# Patient Record
Sex: MALE | Race: WHITE | NOT HISPANIC OR LATINO | URBAN - METROPOLITAN AREA
[De-identification: names, ages, dates, MRNs, and addresses within clinical notes are randomized per-mention and may not be internally consistent; named-entity substitution may affect disease eponyms.]

---

## 2017-06-07 ENCOUNTER — OUTPATIENT (OUTPATIENT)
Dept: OUTPATIENT SERVICES | Facility: HOSPITAL | Age: 82
LOS: 1 days | End: 2017-06-07

## 2017-06-14 ENCOUNTER — OUTPATIENT (OUTPATIENT)
Dept: OUTPATIENT SERVICES | Facility: HOSPITAL | Age: 82
LOS: 1 days | End: 2017-06-14

## 2017-06-21 ENCOUNTER — EMERGENCY (EMERGENCY)
Facility: HOSPITAL | Age: 82
LOS: 1 days | End: 2017-06-21
Payer: MEDICARE

## 2017-06-21 PROCEDURE — 99283 EMERGENCY DEPT VISIT LOW MDM: CPT

## 2018-03-05 ENCOUNTER — EMERGENCY (EMERGENCY)
Facility: HOSPITAL | Age: 83
LOS: 1 days | End: 2018-03-05
Payer: MEDICARE

## 2018-03-05 PROCEDURE — 99284 EMERGENCY DEPT VISIT MOD MDM: CPT

## 2018-03-09 ENCOUNTER — OUTPATIENT (OUTPATIENT)
Dept: OUTPATIENT SERVICES | Facility: HOSPITAL | Age: 83
LOS: 1 days | End: 2018-03-09

## 2018-03-10 ENCOUNTER — EMERGENCY (EMERGENCY)
Facility: HOSPITAL | Age: 83
LOS: 1 days | End: 2018-03-10
Payer: MEDICARE

## 2018-03-10 PROCEDURE — 99284 EMERGENCY DEPT VISIT MOD MDM: CPT

## 2018-03-14 ENCOUNTER — OUTPATIENT (OUTPATIENT)
Dept: OUTPATIENT SERVICES | Facility: HOSPITAL | Age: 83
LOS: 1 days | End: 2018-03-14

## 2018-03-20 ENCOUNTER — INPATIENT (INPATIENT)
Facility: HOSPITAL | Age: 83
LOS: 13 days | Discharge: ANOTHER HOSPITAL | End: 2018-04-03
Attending: FAMILY MEDICINE | Admitting: FAMILY MEDICINE
Payer: MEDICARE

## 2018-03-20 ENCOUNTER — OUTPATIENT (OUTPATIENT)
Dept: OUTPATIENT SERVICES | Facility: HOSPITAL | Age: 83
LOS: 1 days | End: 2018-03-20

## 2018-03-20 PROCEDURE — 71045 X-RAY EXAM CHEST 1 VIEW: CPT | Mod: 26

## 2018-03-20 PROCEDURE — 99285 EMERGENCY DEPT VISIT HI MDM: CPT

## 2018-03-21 ENCOUNTER — OUTPATIENT (OUTPATIENT)
Dept: OUTPATIENT SERVICES | Facility: HOSPITAL | Age: 83
LOS: 1 days | End: 2018-03-21

## 2018-03-22 ENCOUNTER — OUTPATIENT (OUTPATIENT)
Dept: OUTPATIENT SERVICES | Facility: HOSPITAL | Age: 83
LOS: 1 days | End: 2018-03-22

## 2018-03-23 ENCOUNTER — OUTPATIENT (OUTPATIENT)
Dept: OUTPATIENT SERVICES | Facility: HOSPITAL | Age: 83
LOS: 1 days | End: 2018-03-23

## 2018-03-23 PROCEDURE — 72192 CT PELVIS W/O DYE: CPT | Mod: 26

## 2018-03-24 ENCOUNTER — OUTPATIENT (OUTPATIENT)
Dept: OUTPATIENT SERVICES | Facility: HOSPITAL | Age: 83
LOS: 1 days | End: 2018-03-24

## 2018-03-24 PROCEDURE — 71045 X-RAY EXAM CHEST 1 VIEW: CPT | Mod: 26

## 2018-03-25 ENCOUNTER — OUTPATIENT (OUTPATIENT)
Dept: OUTPATIENT SERVICES | Facility: HOSPITAL | Age: 83
LOS: 1 days | End: 2018-03-25

## 2018-03-25 PROCEDURE — 71045 X-RAY EXAM CHEST 1 VIEW: CPT | Mod: 26

## 2018-03-26 ENCOUNTER — OUTPATIENT (OUTPATIENT)
Dept: OUTPATIENT SERVICES | Facility: HOSPITAL | Age: 83
LOS: 1 days | End: 2018-03-26

## 2018-03-28 ENCOUNTER — OUTPATIENT (OUTPATIENT)
Dept: OUTPATIENT SERVICES | Facility: HOSPITAL | Age: 83
LOS: 1 days | End: 2018-03-28

## 2018-03-28 PROCEDURE — 74178 CT ABD&PLV WO CNTR FLWD CNTR: CPT | Mod: 26

## 2018-03-29 ENCOUNTER — OUTPATIENT (OUTPATIENT)
Dept: OUTPATIENT SERVICES | Facility: HOSPITAL | Age: 83
LOS: 1 days | End: 2018-03-29

## 2018-03-30 ENCOUNTER — OUTPATIENT (OUTPATIENT)
Dept: OUTPATIENT SERVICES | Facility: HOSPITAL | Age: 83
LOS: 1 days | End: 2018-03-30

## 2018-03-31 ENCOUNTER — OUTPATIENT (OUTPATIENT)
Dept: OUTPATIENT SERVICES | Facility: HOSPITAL | Age: 83
LOS: 1 days | End: 2018-03-31

## 2018-04-01 ENCOUNTER — OUTPATIENT (OUTPATIENT)
Dept: OUTPATIENT SERVICES | Facility: HOSPITAL | Age: 83
LOS: 1 days | End: 2018-04-01

## 2018-04-02 ENCOUNTER — OUTPATIENT (OUTPATIENT)
Dept: OUTPATIENT SERVICES | Facility: HOSPITAL | Age: 83
LOS: 1 days | End: 2018-04-02

## 2018-04-03 ENCOUNTER — OUTPATIENT (OUTPATIENT)
Dept: OUTPATIENT SERVICES | Facility: HOSPITAL | Age: 83
LOS: 1 days | End: 2018-04-03

## 2019-08-20 ENCOUNTER — APPOINTMENT (OUTPATIENT)
Dept: ULTRASOUND IMAGING | Facility: CLINIC | Age: 84
End: 2019-08-20
Payer: MEDICARE

## 2019-08-20 PROCEDURE — 76775 US EXAM ABDO BACK WALL LIM: CPT

## 2019-10-29 ENCOUNTER — APPOINTMENT (OUTPATIENT)
Dept: RADIOLOGY | Facility: CLINIC | Age: 84
End: 2019-10-29
Payer: MEDICARE

## 2019-10-29 PROCEDURE — 71046 X-RAY EXAM CHEST 2 VIEWS: CPT

## 2019-10-31 ENCOUNTER — OUTPATIENT (OUTPATIENT)
Dept: OUTPATIENT SERVICES | Facility: HOSPITAL | Age: 84
LOS: 1 days | End: 2019-10-31

## 2019-11-25 ENCOUNTER — INPATIENT (INPATIENT)
Facility: HOSPITAL | Age: 84
LOS: 21 days | Discharge: NURSING, REHAB OR LONG-TERM CARE | End: 2019-12-17
Attending: SPECIALIST | Admitting: SPECIALIST
Payer: MEDICARE

## 2019-11-25 VITALS
WEIGHT: 138.01 LBS | HEART RATE: 94 BPM | DIASTOLIC BLOOD PRESSURE: 50 MMHG | SYSTOLIC BLOOD PRESSURE: 121 MMHG | TEMPERATURE: 98 F | OXYGEN SATURATION: 98 % | HEIGHT: 69 IN | RESPIRATION RATE: 17 BRPM

## 2019-11-25 DIAGNOSIS — I63.9 CEREBRAL INFARCTION, UNSPECIFIED: ICD-10-CM

## 2019-11-25 DIAGNOSIS — I25.10 ATHEROSCLEROTIC HEART DISEASE OF NATIVE CORONARY ARTERY WITHOUT ANGINA PECTORIS: ICD-10-CM

## 2019-11-25 DIAGNOSIS — R31.9 HEMATURIA, UNSPECIFIED: ICD-10-CM

## 2019-11-25 DIAGNOSIS — Z71.89 OTHER SPECIFIED COUNSELING: ICD-10-CM

## 2019-11-25 DIAGNOSIS — R31.0 GROSS HEMATURIA: ICD-10-CM

## 2019-11-25 DIAGNOSIS — I10 ESSENTIAL (PRIMARY) HYPERTENSION: ICD-10-CM

## 2019-11-25 DIAGNOSIS — D50.0 IRON DEFICIENCY ANEMIA SECONDARY TO BLOOD LOSS (CHRONIC): ICD-10-CM

## 2019-11-25 DIAGNOSIS — N18.9 CHRONIC KIDNEY DISEASE, UNSPECIFIED: ICD-10-CM

## 2019-11-25 DIAGNOSIS — E78.5 HYPERLIPIDEMIA, UNSPECIFIED: ICD-10-CM

## 2019-11-25 LAB
ANION GAP SERPL CALC-SCNC: 10 MMO/L — SIGNIFICANT CHANGE UP (ref 7–14)
BLD GP AB SCN SERPL QL: NEGATIVE — SIGNIFICANT CHANGE UP
BUN SERPL-MCNC: 25 MG/DL — HIGH (ref 7–23)
CALCIUM SERPL-MCNC: 8.1 MG/DL — LOW (ref 8.4–10.5)
CHLORIDE SERPL-SCNC: 108 MMOL/L — HIGH (ref 98–107)
CO2 SERPL-SCNC: 21 MMOL/L — LOW (ref 22–31)
CREAT SERPL-MCNC: 1.41 MG/DL — HIGH (ref 0.5–1.3)
GLUCOSE SERPL-MCNC: 151 MG/DL — HIGH (ref 70–99)
HCT VFR BLD CALC: 23.6 % — LOW (ref 39–50)
HGB BLD-MCNC: 7.2 G/DL — LOW (ref 13–17)
MCHC RBC-ENTMCNC: 27.6 PG — SIGNIFICANT CHANGE UP (ref 27–34)
MCHC RBC-ENTMCNC: 30.5 % — LOW (ref 32–36)
MCV RBC AUTO: 90.4 FL — SIGNIFICANT CHANGE UP (ref 80–100)
NRBC # FLD: 0.02 K/UL — SIGNIFICANT CHANGE UP (ref 0–0)
PLATELET # BLD AUTO: 208 K/UL — SIGNIFICANT CHANGE UP (ref 150–400)
PMV BLD: 9.9 FL — SIGNIFICANT CHANGE UP (ref 7–13)
POTASSIUM SERPL-MCNC: 3.7 MMOL/L — SIGNIFICANT CHANGE UP (ref 3.5–5.3)
POTASSIUM SERPL-SCNC: 3.7 MMOL/L — SIGNIFICANT CHANGE UP (ref 3.5–5.3)
RBC # BLD: 2.61 M/UL — LOW (ref 4.2–5.8)
RBC # FLD: 18.1 % — HIGH (ref 10.3–14.5)
RH IG SCN BLD-IMP: POSITIVE — SIGNIFICANT CHANGE UP
RH IG SCN BLD-IMP: POSITIVE — SIGNIFICANT CHANGE UP
SODIUM SERPL-SCNC: 139 MMOL/L — SIGNIFICANT CHANGE UP (ref 135–145)
WBC # BLD: 7.41 K/UL — SIGNIFICANT CHANGE UP (ref 3.8–10.5)
WBC # FLD AUTO: 7.41 K/UL — SIGNIFICANT CHANGE UP (ref 3.8–10.5)

## 2019-11-25 PROCEDURE — 99231 SBSQ HOSP IP/OBS SF/LOW 25: CPT

## 2019-11-25 PROCEDURE — 99223 1ST HOSP IP/OBS HIGH 75: CPT

## 2019-11-25 PROCEDURE — 99223 1ST HOSP IP/OBS HIGH 75: CPT | Mod: AI

## 2019-11-25 RX ORDER — ATROPA BELLADONNA AND OPIUM 16.2; 6 MG/1; MG/1
1 SUPPOSITORY RECTAL EVERY 6 HOURS
Refills: 0 | Status: DISCONTINUED | OUTPATIENT
Start: 2019-11-25 | End: 2019-12-01

## 2019-11-25 RX ORDER — TAMSULOSIN HYDROCHLORIDE 0.4 MG/1
0.4 CAPSULE ORAL AT BEDTIME
Refills: 0 | Status: DISCONTINUED | OUTPATIENT
Start: 2019-11-25 | End: 2019-12-03

## 2019-11-25 RX ORDER — ERYTHROPOIETIN 10000 [IU]/ML
10000 INJECTION, SOLUTION INTRAVENOUS; SUBCUTANEOUS
Refills: 0 | Status: DISCONTINUED | OUTPATIENT
Start: 2019-11-25 | End: 2019-12-05

## 2019-11-25 RX ORDER — CALCIUM CARBONATE 500(1250)
1 TABLET ORAL DAILY
Refills: 0 | Status: DISCONTINUED | OUTPATIENT
Start: 2019-11-25 | End: 2019-12-05

## 2019-11-25 RX ORDER — ALUMINUM SULFATE
1 POWDER (GRAM) MISCELLANEOUS DAILY
Refills: 0 | Status: DISCONTINUED | OUTPATIENT
Start: 2019-11-25 | End: 2019-11-28

## 2019-11-25 RX ORDER — FOLIC ACID 0.8 MG
1 TABLET ORAL DAILY
Refills: 0 | Status: DISCONTINUED | OUTPATIENT
Start: 2019-11-25 | End: 2019-12-05

## 2019-11-25 RX ORDER — SENNA PLUS 8.6 MG/1
2 TABLET ORAL AT BEDTIME
Refills: 0 | Status: DISCONTINUED | OUTPATIENT
Start: 2019-11-25 | End: 2019-12-05

## 2019-11-25 RX ORDER — ATORVASTATIN CALCIUM 80 MG/1
20 TABLET, FILM COATED ORAL AT BEDTIME
Refills: 0 | Status: DISCONTINUED | OUTPATIENT
Start: 2019-11-25 | End: 2019-12-05

## 2019-11-25 RX ORDER — PREGABALIN 225 MG/1
1000 CAPSULE ORAL DAILY
Refills: 0 | Status: DISCONTINUED | OUTPATIENT
Start: 2019-11-25 | End: 2019-12-05

## 2019-11-25 RX ORDER — NIFEDIPINE 30 MG
30 TABLET, EXTENDED RELEASE 24 HR ORAL DAILY
Refills: 0 | Status: DISCONTINUED | OUTPATIENT
Start: 2019-11-25 | End: 2019-12-05

## 2019-11-25 RX ORDER — FERROUS SULFATE 325(65) MG
325 TABLET ORAL DAILY
Refills: 0 | Status: DISCONTINUED | OUTPATIENT
Start: 2019-11-25 | End: 2019-12-05

## 2019-11-25 RX ORDER — FINASTERIDE 5 MG/1
5 TABLET, FILM COATED ORAL DAILY
Refills: 0 | Status: DISCONTINUED | OUTPATIENT
Start: 2019-11-25 | End: 2019-12-05

## 2019-11-25 RX ORDER — INFLUENZA VIRUS VACCINE 15; 15; 15; 15 UG/.5ML; UG/.5ML; UG/.5ML; UG/.5ML
0.5 SUSPENSION INTRAMUSCULAR ONCE
Refills: 0 | Status: DISCONTINUED | OUTPATIENT
Start: 2019-11-25 | End: 2019-12-16

## 2019-11-25 RX ORDER — ALBUTEROL 90 UG/1
2 AEROSOL, METERED ORAL EVERY 6 HOURS
Refills: 0 | Status: DISCONTINUED | OUTPATIENT
Start: 2019-11-25 | End: 2019-12-05

## 2019-11-25 RX ORDER — ACETAMINOPHEN 500 MG
650 TABLET ORAL EVERY 6 HOURS
Refills: 0 | Status: DISCONTINUED | OUTPATIENT
Start: 2019-11-25 | End: 2019-11-26

## 2019-11-25 RX ORDER — TIOTROPIUM BROMIDE 18 UG/1
1 CAPSULE ORAL; RESPIRATORY (INHALATION) DAILY
Refills: 0 | Status: DISCONTINUED | OUTPATIENT
Start: 2019-11-25 | End: 2019-12-05

## 2019-11-25 RX ORDER — HEPARIN SODIUM 5000 [USP'U]/ML
5000 INJECTION INTRAVENOUS; SUBCUTANEOUS EVERY 8 HOURS
Refills: 0 | Status: DISCONTINUED | OUTPATIENT
Start: 2019-11-25 | End: 2019-12-05

## 2019-11-25 RX ORDER — SODIUM CHLORIDE 9 MG/ML
3 INJECTION INTRAMUSCULAR; INTRAVENOUS; SUBCUTANEOUS EVERY 8 HOURS
Refills: 0 | Status: DISCONTINUED | OUTPATIENT
Start: 2019-11-25 | End: 2019-12-05

## 2019-11-25 RX ADMIN — Medication 1 TABLET(S): at 15:57

## 2019-11-25 RX ADMIN — FINASTERIDE 5 MILLIGRAM(S): 5 TABLET, FILM COATED ORAL at 15:57

## 2019-11-25 RX ADMIN — TIOTROPIUM BROMIDE 1 CAPSULE(S): 18 CAPSULE ORAL; RESPIRATORY (INHALATION) at 20:04

## 2019-11-25 RX ADMIN — Medication 650 MILLIGRAM(S): at 21:24

## 2019-11-25 RX ADMIN — ATORVASTATIN CALCIUM 20 MILLIGRAM(S): 80 TABLET, FILM COATED ORAL at 21:24

## 2019-11-25 RX ADMIN — Medication 650 MILLIGRAM(S): at 22:47

## 2019-11-25 RX ADMIN — HEPARIN SODIUM 5000 UNIT(S): 5000 INJECTION INTRAVENOUS; SUBCUTANEOUS at 21:24

## 2019-11-25 RX ADMIN — ALBUTEROL 2 PUFF(S): 90 AEROSOL, METERED ORAL at 15:57

## 2019-11-25 RX ADMIN — ALBUTEROL 2 PUFF(S): 90 AEROSOL, METERED ORAL at 21:24

## 2019-11-25 RX ADMIN — SENNA PLUS 2 TABLET(S): 8.6 TABLET ORAL at 21:23

## 2019-11-25 RX ADMIN — SODIUM CHLORIDE 3 MILLILITER(S): 9 INJECTION INTRAMUSCULAR; INTRAVENOUS; SUBCUTANEOUS at 21:22

## 2019-11-25 RX ADMIN — SODIUM CHLORIDE 3 MILLILITER(S): 9 INJECTION INTRAMUSCULAR; INTRAVENOUS; SUBCUTANEOUS at 14:17

## 2019-11-25 RX ADMIN — PREGABALIN 1000 MICROGRAM(S): 225 CAPSULE ORAL at 15:57

## 2019-11-25 RX ADMIN — Medication 325 MILLIGRAM(S): at 15:57

## 2019-11-25 RX ADMIN — Medication 1 APPLICATION(S): at 17:29

## 2019-11-25 RX ADMIN — TAMSULOSIN HYDROCHLORIDE 0.4 MILLIGRAM(S): 0.4 CAPSULE ORAL at 21:24

## 2019-11-25 RX ADMIN — Medication 1 MILLIGRAM(S): at 15:57

## 2019-11-25 NOTE — H&P ADULT - NSICDXPASTMEDICALHX_GEN_ALL_CORE_FT
PAST MEDICAL HISTORY:  WILLIAM (acute kidney injury)     CAD (coronary artery disease)     Cerebrovascular accident (CVA)     Chronic kidney disease (CKD)     Prostate CA

## 2019-11-25 NOTE — H&P ADULT - HISTORY OF PRESENT ILLNESS
This 89 yo M transferred from [facility] since 10/31 with hematuria; pt has hx prostate Ca and had radiation; has radiation cystitis; has recieved transfusions and has gone to OR at [facility]; also has hx of hyperbaric oxygen treatments last year and now during this hospitalization. This 89 yo M transferred from [facility] since 10/31 with hematuria; pt has hx prostate Ca and treated with radiation. He developed radiation cystitis. Over the last year or so, he was treated with cystoscopy which revealed some bladder tumors which were resected. He was hyperbaric oxygen to which he responded. He most recently had another bout of gross hematuria. Cystoscopy revealed some small tumors that was resected, diffuse oozing and telangiectasias consistent with radiation cystitis. Cystogram reveled brisk bilateral reflux, thus formalin instillation was not performed. He failed to respond to hyperbaric oxygen. He has received transfusions. He was transferred here for Alum instillations into the bladder since [facility]'s pharmacy did not have Alum.

## 2019-11-25 NOTE — H&P ADULT - RESPIRATORY
Physical Therapy Daily Progress Note            Subjective Evaluation    History of Present Illness    Subjective comment: About the same       Objective   See Exercise, Manual, and Modality Logs for complete treatment.       Assessment & Plan     Assessment  Assessment details: Slightly less tenderness at extensor tendon than last treatment however still has pain with bicep curl and tricep extensions.      Plan  Plan details: Continue of two more appointments before returning to MD                     Manual Therapy:    10     mins  [id number];  Therapeutic Exercise:    20     mins  [id number];       Timed Treatment:   30   mins   Total Treatment:     30   mins    Radha Lagunas, PT  Physical Therapist   Breath Sounds equal & clear to percussion & auscultation, no accessory muscle use

## 2019-11-25 NOTE — H&P ADULT - PROBLEM SELECTOR PLAN 1
Alum CBI  Labs Alum ordered to be administered via continuous bladder irrigation.  Potential fo Aluminum toxicity discussed.  CBC drawn.  May need to receive further transfusions.  Hand irrigation for clots as needed.

## 2019-11-25 NOTE — CONSULT NOTE ADULT - SUBJECTIVE AND OBJECTIVE BOX
PMD Dr. Dawson ([place]); Urology Dr. García Best ([facility])  Patient is a 89y old  Male who presents with a chief complaint of Hematuria (25 Nov 2019 13:14)    HPI:  89M CAD, s/p CABG 1998, stent 2005, CVA with R weakness and expressive aphasia (24y ago), HTN, CKD, HLD, prostate cancer treated >20 years ago, radiation cystitis treated with HBO in 2018, developed recurrent hematuria in July 2019.  Has been at [facility] since 10/31 with hematuria received 15 units PRBCs, gone to OR at [facility] and had 15 hyperbaric oxygen treatments.  Unfortunately hematuria persists.  Transferred here for further treatment.      Allergies:  No Known Allergies    HOME MEDICATIONS: [x ] Reviewed  valsartan 160 bid, nifedipine ER 30 am, allopurinol 100 qd, Crestor 5, Fe, Ca, C, MVI    MEDICATIONS  (STANDING):  ALBUTerol    90 MICROgram(s) HFA Inhaler 2 Puff(s) Inhalation every 6 hours  aluminum sulfate 1% Irrigation in sterile water 1 Application(s) Topical daily  atorvastatin 20 milliGRAM(s) Oral at bedtime  calcium carbonate   1250 mG (OsCal) 1 Tablet(s) Oral daily  cyanocobalamin 1000 MICROGram(s) Oral daily  epoetin gianfranco Injectable 52130 Unit(s) SubCutaneous <User Schedule>  ferrous    sulfate 325 milliGRAM(s) Oral daily  finasteride 5 milliGRAM(s) Oral daily  folic acid 1 milliGRAM(s) Oral daily  heparin  Injectable 5000 Unit(s) SubCutaneous every 8 hours  NIFEdipine XL 30 milliGRAM(s) Oral daily  senna 2 Tablet(s) Oral at bedtime  sodium chloride 0.9% lock flush 3 milliLiter(s) IV Push every 8 hours  tamsulosin 0.4 milliGRAM(s) Oral at bedtime  tiotropium 18 MICROgram(s) Capsule 1 Capsule(s) Inhalation daily    MEDICATIONS  (PRN):    PAST MEDICAL & SURGICAL HISTORY:  Chronic kidney disease (CKD)  WILLIAM (acute kidney injury)  Cerebrovascular accident (CVA)  Prostate CA  CAD (coronary artery disease)    SOCIAL HISTORY:  , lives at home with wife, occasional would use cane  retired from [employer]      FAMILY HISTORY:  [x ] No pertinent family history in first degree relatives     REVIEW OF SYSTEMS:  CONSTITUTIONAL: No fever, weight loss, or fatigue  EYES: No eye pain, visual disturbances, or discharge  ENMT:  No difficulty hearing, tinnitus, vertigo; No sinus or throat pain  NECK: No pain or stiffness  BREASTS: No pain, masses, or nipple discharge  RESPIRATORY: No cough, wheezing, chills or hemoptysis; No shortness of breath  CARDIOVASCULAR: No chest pain, palpitations, dizziness, or leg swelling  GASTROINTESTINAL: No abdominal or epigastric pain. No nausea, vomiting, or hematemesis; No diarrhea or constipation. No melena or hematochezia.  GENITOURINARY: per HPI  NEUROLOGICAL: No headaches, memory loss, loss of strength, numbness, or tremors  SKIN: No itching, burning, rashes, or lesions   LYMPH NODES: No enlarged glands  ENDOCRINE: No heat or cold intolerance; No hair loss  MUSCULOSKELETAL: No muscle or back pain  PSYCHIATRIC: No depression, anxiety, mood swings, or difficulty sleeping  HEME/LYMPH: No easy bruising, or bleeding gums  ALLERGY AND IMMUNOLOGIC: No hives or eczema  [  ] All other ROS negative  [  ] Unable to obtain due to poor mental status    Vital Signs Last 24 Hrs  T(C): 36.7 (25 Nov 2019 13:34), Max: 36.7 (25 Nov 2019 13:34)  T(F): 98 (25 Nov 2019 13:34), Max: 98 (25 Nov 2019 13:34)  HR: 94 (25 Nov 2019 13:34) (94 - 94)  BP: 121/50 (25 Nov 2019 13:34) (121/50 - 121/50)  BP(mean): --  RR: 17 (25 Nov 2019 13:34) (17 - 17)  SpO2: 98% (25 Nov 2019 13:34) (98% - 98%)    PHYSICAL EXAM:  GENERAL: elderly WM, sitting up in bed, pleasant, NAD  HEAD:  Atraumatic, Normocephalic  EYES: EOMI, PERRLA, conjunctiva and sclera clear  ENMT: Moist mucous membranes  NECK: Supple, No JVD  RESPIRATORY: Clear to auscultation bilaterally; No rales, rhonchi, wheezing, or rubs  CARDIOVASCULAR: Regular rate and rhythm; 3/6 systolic murmur  GASTROINTESTINAL: Soft, Nontender, Nondistended; Bowel sounds present  GENITOURINARY: beach/CBI, bloody  EXTREMITIES:  2+ Peripheral Pulses, No clubbing, cyanosis, or edema  NERVOUS SYSTEM: dysarthria, mild expressive aphasia, R sided weakness  HEME/LYMPH: No lymphadenopathy noted  SKIN: No rashes or lesions    LABS:                        7.2    7.41  )-----------( 208      ( 25 Nov 2019 15:15 )             23.6     RADIOLOGY & ADDITIONAL STUDIES:    EKG:   Personally Reviewed:  [ ] YES     Imaging:   Personally Reviewed:  [ ] YES               Consultant(s) notes reviewed:    Care Discussed with Consultant(s)/Other Providers:  urology re overall care PMD Dr. Dawson ([place]); Urology Dr. García Best ([facility])  Patient is a 89y old  Male who presents with a chief complaint of Hematuria (25 Nov 2019 13:14)    HPI:  89M CAD, s/p CABG 1998, stent 2005, CVA with R weakness and expressive aphasia (24y ago), HTN, CKD, HLD, prostate cancer treated >20 years ago, radiation cystitis treated with HBO in 2018, developed recurrent hematuria in July 2019.  Has been at [facility] since 10/31 with hematuria received 15 units PRBCs, gone to OR and had 15 hyperbaric oxygen treatments.  Unfortunately hematuria persists.  Transferred here for further treatment.  Pt and wife hopeful new treatment will stop the bleeding.  Pt with no c/o pain now, intermittent bladder spasms with clotting.    Allergies:  No Known Allergies    HOME MEDICATIONS: [x ] Reviewed  valsartan 160 bid, nifedipine ER 30 am, allopurinol 100 qd, Crestor 5, Fe, Ca, C, MVI    MEDICATIONS  (STANDING):  ALBUTerol    90 MICROgram(s) HFA Inhaler 2 Puff(s) Inhalation every 6 hours  aluminum sulfate 1% Irrigation in sterile water 1 Application(s) Topical daily  atorvastatin 20 milliGRAM(s) Oral at bedtime  calcium carbonate   1250 mG (OsCal) 1 Tablet(s) Oral daily  cyanocobalamin 1000 MICROGram(s) Oral daily  epoetin gianfranco Injectable 22308 Unit(s) SubCutaneous <User Schedule>  ferrous    sulfate 325 milliGRAM(s) Oral daily  finasteride 5 milliGRAM(s) Oral daily  folic acid 1 milliGRAM(s) Oral daily  heparin  Injectable 5000 Unit(s) SubCutaneous every 8 hours  NIFEdipine XL 30 milliGRAM(s) Oral daily  senna 2 Tablet(s) Oral at bedtime  sodium chloride 0.9% lock flush 3 milliLiter(s) IV Push every 8 hours  tamsulosin 0.4 milliGRAM(s) Oral at bedtime  tiotropium 18 MICROgram(s) Capsule 1 Capsule(s) Inhalation daily    MEDICATIONS  (PRN):    PAST MEDICAL & SURGICAL HISTORY:  Chronic kidney disease (CKD)  WILLIAM (acute kidney injury)  Cerebrovascular accident (CVA)  Prostate CA  CAD (coronary artery disease)    SOCIAL HISTORY:  , lives at home with wife, occasional would use cane  retired from [employer] by [employer]      FAMILY HISTORY:  [x ] No pertinent family history in first degree relatives     REVIEW OF SYSTEMS:  CONSTITUTIONAL: No fever, weight loss, or fatigue  EYES: No eye pain, visual disturbances, or discharge  ENMT:  No difficulty hearing, tinnitus, vertigo; No sinus or throat pain  NECK: No pain or stiffness  BREASTS: No pain, masses, or nipple discharge  RESPIRATORY: No cough, wheezing, chills or hemoptysis; No shortness of breath  CARDIOVASCULAR: No chest pain, palpitations, dizziness, or leg swelling  GASTROINTESTINAL: No abdominal or epigastric pain. No nausea, vomiting, or hematemesis; No diarrhea or constipation. No melena or hematochezia.  GENITOURINARY: per HPI  NEUROLOGICAL: No headaches, memory loss, loss of strength, numbness, or tremors  SKIN: No itching, burning, rashes, or lesions   LYMPH NODES: No enlarged glands  ENDOCRINE: No heat or cold intolerance; No hair loss  MUSCULOSKELETAL: No muscle or back pain  PSYCHIATRIC: No depression, anxiety, mood swings, or difficulty sleeping  HEME/LYMPH: No easy bruising, or bleeding gums  ALLERGY AND IMMUNOLOGIC: No hives or eczema  [  ] All other ROS negative  [  ] Unable to obtain due to poor mental status    Vital Signs Last 24 Hrs  T(C): 36.7 (25 Nov 2019 13:34), Max: 36.7 (25 Nov 2019 13:34)  T(F): 98 (25 Nov 2019 13:34), Max: 98 (25 Nov 2019 13:34)  HR: 94 (25 Nov 2019 13:34) (94 - 94)  BP: 121/50 (25 Nov 2019 13:34) (121/50 - 121/50)  BP(mean): --  RR: 17 (25 Nov 2019 13:34) (17 - 17)  SpO2: 98% (25 Nov 2019 13:34) (98% - 98%)    PHYSICAL EXAM:  GENERAL: elderly WM, sitting up in bed, pleasant, NAD  HEAD:  Atraumatic, Normocephalic  EYES: EOMI, PERRLA, conjunctiva and sclera clear  ENMT: Moist mucous membranes  NECK: Supple, No JVD  RESPIRATORY: Clear to auscultation bilaterally; No rales, rhonchi, wheezing, or rubs  CARDIOVASCULAR: Regular rate and rhythm; 3/6 systolic murmur  GASTROINTESTINAL: Soft, Nontender, Nondistended; Bowel sounds present  GENITOURINARY: beach/CBI, bloody  EXTREMITIES:  2+ Peripheral Pulses, No clubbing, cyanosis, or edema  NERVOUS SYSTEM: dysarthria, mild expressive aphasia, R sided weakness  HEME/LYMPH: No lymphadenopathy noted  SKIN: No rashes or lesions    LABS:                        7.2    7.41  )-----------( 208      ( 25 Nov 2019 15:15 )             23.6     RADIOLOGY & ADDITIONAL STUDIES:    EKG:   Personally Reviewed:  [ ] YES     Imaging:   Personally Reviewed:  [ ] YES               Consultant(s) notes reviewed:    Care Discussed with Consultant(s)/Other Providers:  urology re overall care

## 2019-11-25 NOTE — CONSULT NOTE ADULT - PROBLEM SELECTOR RECOMMENDATION 8
started discussion of ACP, wife very supportive, copy of MOLST given to pt/wife to review with family, will f/u

## 2019-11-26 LAB
ANION GAP SERPL CALC-SCNC: 11 MMO/L — SIGNIFICANT CHANGE UP (ref 7–14)
BUN SERPL-MCNC: 24 MG/DL — HIGH (ref 7–23)
CALCIUM SERPL-MCNC: 7.9 MG/DL — LOW (ref 8.4–10.5)
CHLORIDE SERPL-SCNC: 110 MMOL/L — HIGH (ref 98–107)
CO2 SERPL-SCNC: 19 MMOL/L — LOW (ref 22–31)
CREAT SERPL-MCNC: 1.39 MG/DL — HIGH (ref 0.5–1.3)
GLUCOSE SERPL-MCNC: 162 MG/DL — HIGH (ref 70–99)
HCT VFR BLD CALC: 30.1 % — LOW (ref 39–50)
HGB BLD-MCNC: 9.6 G/DL — LOW (ref 13–17)
MCHC RBC-ENTMCNC: 28.2 PG — SIGNIFICANT CHANGE UP (ref 27–34)
MCHC RBC-ENTMCNC: 31.9 % — LOW (ref 32–36)
MCV RBC AUTO: 88.3 FL — SIGNIFICANT CHANGE UP (ref 80–100)
NRBC # FLD: 0 K/UL — SIGNIFICANT CHANGE UP (ref 0–0)
PLATELET # BLD AUTO: 229 K/UL — SIGNIFICANT CHANGE UP (ref 150–400)
PMV BLD: 9.3 FL — SIGNIFICANT CHANGE UP (ref 7–13)
POTASSIUM SERPL-MCNC: 3.9 MMOL/L — SIGNIFICANT CHANGE UP (ref 3.5–5.3)
POTASSIUM SERPL-SCNC: 3.9 MMOL/L — SIGNIFICANT CHANGE UP (ref 3.5–5.3)
RBC # BLD: 3.41 M/UL — LOW (ref 4.2–5.8)
RBC # FLD: 17.2 % — HIGH (ref 10.3–14.5)
SODIUM SERPL-SCNC: 140 MMOL/L — SIGNIFICANT CHANGE UP (ref 135–145)
WBC # BLD: 10.53 K/UL — HIGH (ref 3.8–10.5)
WBC # FLD AUTO: 10.53 K/UL — HIGH (ref 3.8–10.5)

## 2019-11-26 PROCEDURE — 99232 SBSQ HOSP IP/OBS MODERATE 35: CPT

## 2019-11-26 PROCEDURE — 99233 SBSQ HOSP IP/OBS HIGH 50: CPT

## 2019-11-26 RX ORDER — OXYCODONE HYDROCHLORIDE 5 MG/1
5 TABLET ORAL EVERY 4 HOURS
Refills: 0 | Status: DISCONTINUED | OUTPATIENT
Start: 2019-11-26 | End: 2019-12-04

## 2019-11-26 RX ORDER — ACETAMINOPHEN 500 MG
1000 TABLET ORAL ONCE
Refills: 0 | Status: COMPLETED | OUTPATIENT
Start: 2019-11-26 | End: 2019-11-26

## 2019-11-26 RX ORDER — ACETAMINOPHEN 500 MG
650 TABLET ORAL EVERY 6 HOURS
Refills: 0 | Status: DISCONTINUED | OUTPATIENT
Start: 2019-11-26 | End: 2019-11-26

## 2019-11-26 RX ORDER — OXYBUTYNIN CHLORIDE 5 MG
5 TABLET ORAL
Refills: 0 | Status: DISCONTINUED | OUTPATIENT
Start: 2019-11-26 | End: 2019-12-05

## 2019-11-26 RX ADMIN — HEPARIN SODIUM 5000 UNIT(S): 5000 INJECTION INTRAVENOUS; SUBCUTANEOUS at 06:53

## 2019-11-26 RX ADMIN — Medication 325 MILLIGRAM(S): at 13:00

## 2019-11-26 RX ADMIN — ALBUTEROL 2 PUFF(S): 90 AEROSOL, METERED ORAL at 15:04

## 2019-11-26 RX ADMIN — ALBUTEROL 2 PUFF(S): 90 AEROSOL, METERED ORAL at 04:03

## 2019-11-26 RX ADMIN — Medication 650 MILLIGRAM(S): at 04:03

## 2019-11-26 RX ADMIN — Medication 1 TABLET(S): at 13:00

## 2019-11-26 RX ADMIN — Medication 400 MILLIGRAM(S): at 10:33

## 2019-11-26 RX ADMIN — Medication 1000 MILLIGRAM(S): at 19:00

## 2019-11-26 RX ADMIN — TIOTROPIUM BROMIDE 1 CAPSULE(S): 18 CAPSULE ORAL; RESPIRATORY (INHALATION) at 10:33

## 2019-11-26 RX ADMIN — ALBUTEROL 2 PUFF(S): 90 AEROSOL, METERED ORAL at 22:19

## 2019-11-26 RX ADMIN — Medication 30 MILLIGRAM(S): at 06:53

## 2019-11-26 RX ADMIN — SODIUM CHLORIDE 3 MILLILITER(S): 9 INJECTION INTRAMUSCULAR; INTRAVENOUS; SUBCUTANEOUS at 06:53

## 2019-11-26 RX ADMIN — HEPARIN SODIUM 5000 UNIT(S): 5000 INJECTION INTRAVENOUS; SUBCUTANEOUS at 15:03

## 2019-11-26 RX ADMIN — ATROPA BELLADONNA AND OPIUM 1 SUPPOSITORY(S): 16.2; 6 SUPPOSITORY RECTAL at 12:12

## 2019-11-26 RX ADMIN — PREGABALIN 1000 MICROGRAM(S): 225 CAPSULE ORAL at 13:00

## 2019-11-26 RX ADMIN — ATORVASTATIN CALCIUM 20 MILLIGRAM(S): 80 TABLET, FILM COATED ORAL at 22:18

## 2019-11-26 RX ADMIN — FINASTERIDE 5 MILLIGRAM(S): 5 TABLET, FILM COATED ORAL at 13:00

## 2019-11-26 RX ADMIN — ATROPA BELLADONNA AND OPIUM 1 SUPPOSITORY(S): 16.2; 6 SUPPOSITORY RECTAL at 05:54

## 2019-11-26 RX ADMIN — SODIUM CHLORIDE 3 MILLILITER(S): 9 INJECTION INTRAMUSCULAR; INTRAVENOUS; SUBCUTANEOUS at 22:16

## 2019-11-26 RX ADMIN — ATROPA BELLADONNA AND OPIUM 1 SUPPOSITORY(S): 16.2; 6 SUPPOSITORY RECTAL at 04:47

## 2019-11-26 RX ADMIN — Medication 400 MILLIGRAM(S): at 18:15

## 2019-11-26 RX ADMIN — ATROPA BELLADONNA AND OPIUM 1 SUPPOSITORY(S): 16.2; 6 SUPPOSITORY RECTAL at 22:19

## 2019-11-26 RX ADMIN — ATROPA BELLADONNA AND OPIUM 1 SUPPOSITORY(S): 16.2; 6 SUPPOSITORY RECTAL at 23:16

## 2019-11-26 RX ADMIN — Medication 1 MILLIGRAM(S): at 13:00

## 2019-11-26 RX ADMIN — ATROPA BELLADONNA AND OPIUM 1 SUPPOSITORY(S): 16.2; 6 SUPPOSITORY RECTAL at 13:30

## 2019-11-26 RX ADMIN — TAMSULOSIN HYDROCHLORIDE 0.4 MILLIGRAM(S): 0.4 CAPSULE ORAL at 22:18

## 2019-11-26 RX ADMIN — HEPARIN SODIUM 5000 UNIT(S): 5000 INJECTION INTRAVENOUS; SUBCUTANEOUS at 22:18

## 2019-11-26 RX ADMIN — Medication 5 MILLIGRAM(S): at 17:33

## 2019-11-26 RX ADMIN — Medication 5 MILLIGRAM(S): at 08:39

## 2019-11-26 RX ADMIN — ALBUTEROL 2 PUFF(S): 90 AEROSOL, METERED ORAL at 10:33

## 2019-11-26 RX ADMIN — Medication 1000 MILLIGRAM(S): at 11:08

## 2019-11-26 RX ADMIN — Medication 1 APPLICATION(S): at 15:03

## 2019-11-26 RX ADMIN — SODIUM CHLORIDE 3 MILLILITER(S): 9 INJECTION INTRAMUSCULAR; INTRAVENOUS; SUBCUTANEOUS at 13:01

## 2019-11-26 NOTE — PROGRESS NOTE ADULT - SUBJECTIVE AND OBJECTIVE BOX
Afeb 125/43 75 98%RA    Pt has c/o bladder spasms with alum CBI; was irrigated overnite    Abd- soft NT ND   Lal/CBI brownish-red  Recieved 2UPC yesterday Afeb 125/43 75 98%RA    Pt has c/o bladder spasms with alum CBI; was hand  irrigated overnight.    Abd- soft NT ND   Lal/CBI brownish-red  Received 2 units PRBCs yesterday for acute blood loss anemia.

## 2019-11-26 NOTE — PROGRESS NOTE ADULT - ASSESSMENT
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  Has been at [facility] since 10/31 with hematuria received 15 units PRBCs, gone to OR and had 15 hyperbaric oxygen treatments.  Transferred for persistent hematuria, on trial of Alum

## 2019-11-26 NOTE — DIETITIAN INITIAL EVALUATION ADULT. - 30 CAL TO
Admission Reconciliation is Completed  Discharge Reconciliation is Not Complete [id number] Admission Reconciliation is Completed  Discharge Reconciliation is Completed

## 2019-11-26 NOTE — PROGRESS NOTE ADULT - SUBJECTIVE AND OBJECTIVE BOX
[facility] Division of Hospital Medicine  Andreia Bello MD  Pager (M-F, 8A-5P):  In-house pager [phone]; Long-range pager [phone]  Other Times:  Please page Hospitalist in Charge -  In-house pager [phone]    Patient is a 89y old  Male who presents with a chief complaint of Hematuria (26 Nov 2019 07:13)      SUBJECTIVE / OVERNIGHT EVENTS: S/p 2 units PRBCs yesterday. C/o bladder spasms, irrigated over night, feels needs to be again.  Didn't sleep much d/t discomfort.  Wife at bedside.  ADDITIONAL REVIEW OF SYSTEMS:    MEDICATIONS  (STANDING):  ALBUTerol    90 MICROgram(s) HFA Inhaler 2 Puff(s) Inhalation every 6 hours  aluminum sulfate 1% Irrigation in sterile water 1 Application(s) Topical daily  atorvastatin 20 milliGRAM(s) Oral at bedtime  calcium carbonate   1250 mG (OsCal) 1 Tablet(s) Oral daily  cyanocobalamin 1000 MICROGram(s) Oral daily  epoetin gianfranco Injectable 26536 Unit(s) SubCutaneous <User Schedule>  ferrous    sulfate 325 milliGRAM(s) Oral daily  finasteride 5 milliGRAM(s) Oral daily  folic acid 1 milliGRAM(s) Oral daily  heparin  Injectable 5000 Unit(s) SubCutaneous every 8 hours  influenza   Vaccine 0.5 milliLiter(s) IntraMuscular once  NIFEdipine XL 30 milliGRAM(s) Oral daily  oxybutynin 5 milliGRAM(s) Oral two times a day  senna 2 Tablet(s) Oral at bedtime  sodium chloride 0.9% lock flush 3 milliLiter(s) IV Push every 8 hours  tamsulosin 0.4 milliGRAM(s) Oral at bedtime  tiotropium 18 MICROgram(s) Capsule 1 Capsule(s) Inhalation daily    MEDICATIONS  (PRN):  belladonna 16.2 mG/opium 30 mg Suppository 1 Suppository(s) Rectal every 6 hours PRN severe pain      CAPILLARY BLOOD GLUCOSE        I&O's Summary    25 Nov 2019 07:01  -  26 Nov 2019 07:00  --------------------------------------------------------  IN: 0 mL / OUT: 1700 mL / NET: -1700 mL        PHYSICAL EXAM:  Vital Signs Last 24 Hrs  T(C): 36.4 (26 Nov 2019 10:21), Max: 37 (26 Nov 2019 01:41)  T(F): 97.5 (26 Nov 2019 10:21), Max: 98.6 (26 Nov 2019 01:41)  HR: 92 (26 Nov 2019 10:21) (75 - 98)  BP: 149/59 (26 Nov 2019 10:21) (107/65 - 149/59)  BP(mean): 52 (25 Nov 2019 17:29) (52 - 52)  RR: 16 (26 Nov 2019 10:21) (16 - 17)  SpO2: 97% (26 Nov 2019 10:21) (94% - 99%)    PHYSICAL EXAM:  GENERAL: elderly WM, sitting up in bed, pleasant, moaning in discomfort  HEAD:  Atraumatic, Normocephalic  EYES: EOMI, PERRLA, conjunctiva and sclera clear  ENMT: Moist mucous membranes  NECK: Supple, No JVD  RESPIRATORY: Clear to auscultation bilaterally; No rales, rhonchi, wheezing, or rubs  CARDIOVASCULAR: Regular rate and rhythm; 3/6 systolic murmur  GASTROINTESTINAL: Soft, Nontender, Nondistended; Bowel sounds present  GENITOURINARY: beach, brown urine  EXTREMITIES:  2+ Peripheral Pulses, No clubbing, cyanosis, or edema  NERVOUS SYSTEM: dysarthria, mild expressive aphasia, R sided weakness  HEME/LYMPH: No lymphadenopathy noted  SKIN: No rashes or lesions    LABS:                        9.6    10.53 )-----------( 229      ( 26 Nov 2019 06:35 )             30.1     11-26    140  |  110<H>  |  24<H>  ----------------------------<  162<H>  3.9   |  19<L>  |  1.39<H>    Ca    7.9<L>      26 Nov 2019 06:35                  RADIOLOGY & ADDITIONAL TESTS:  Results Reviewed:   Imaging Personally Reviewed:  Electrocardiogram Personally Reviewed:    COORDINATION OF CARE:  Care Discussed with Consultants/Other Providers [Y/N]: urology re overall care  Prior or Outpatient Records Reviewed [Y/N]:

## 2019-11-26 NOTE — PHYSICAL THERAPY INITIAL EVALUATION ADULT - PERTINENT HX OF CURRENT PROBLEM, REHAB EVAL
89 y/o Male [facility] with hematuria; pt has hx prostate Ca and had radiation; has radiation cystitis; has recieved transfusions and has gone to OR at [facility]; also has hx of hyperbaric oxygen treatments last year and now during this hospitalization. 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

## 2019-11-26 NOTE — PHYSICAL THERAPY INITIAL EVALUATION ADULT - IMPAIRMENTS FOUND, PT EVAL
gait, locomotion, and balance/aerobic capacity/endurance/neuromotor development and sensory integration

## 2019-11-26 NOTE — ADVANCED PRACTICE NURSE CONSULT - RECOMMEDATIONS
B/L buttock and sacrum: Cleanse with skin cleanser, pat dry. Apply TRIAD paste twice a day and PRN.    B/L heels: Apply Liquid barrier film daily. Use offloading z-float boots.    Continue low air loss bed therapy, continue heel elevation, continue to turn & reposition q2h, continue moisture management with barrier paste & single breathable pad, continue measures to decrease friction/shear/pressure. Apply Sween 24 moisturizing lotion daily to generalized dryness.    Please call wound care service line is further assistance is needed ([phone]).

## 2019-11-26 NOTE — ADVANCED PRACTICE NURSE CONSULT - ASSESSMENT
A&Ox3, indwelling urethral catheter receiving CBI. Skin warm, dry with increased moisture in intertriginous folds, adequate skin turgor. Generalized dry/flaky skin.    Bilateral lower extremities with generalized dry, flaky skin. No temperature changes noted. No Edema. Capillary refill < 3 seconds. B/L DP/PT pulses palpable, with biphasic doppler sounds.     Right heel, Stage 1 Pressure Injury measures [id number]. 100% nonblanchable erythema. Epidermis intact, no drainage. Periwound skin no induration, no increased warmth, no erythema. Goal of care: offload pressure and monitor for changes in tissue type.    Left heel, Stage 1 Pressure Injury measures [id number]. 100% nonblanchable erythema. Epidermis intact, no drainage. Periwound skin no induration, no increased warmth, no erythema. Goal of care: offload pressure and monitor for changes in tissue type.    Sacrum, Stage 2 pressure injury complicated by moisture associated dermatitis of sacral fold measures 1.7foh0tlo0.2cm. When patient is in natural anatomical position, unable to visualize injury because it is within sacral fold. Wound base 100% exposed dermis. Periwound skin with circumferential maceration, no induration, no increased warmth , no erythema. Goal of care: protect from moisture, hydrophilic healing.    Right inner buttock, Stage 2 pressure injury measures [id number]. Pressure injury complicated by moisture associated dermatitis created when patient is in natural anatomical position, unable to visualize injury and the injury creates a symmetrical mirror image to the injury of the left buttock. Wound base 100% exposed dermis. Periwound skin with circumferential maceration, no induration, no increased warmth , no erythema. Goal of care: protect from moisture, hydrophilic healing.    Left inner buttock, Stage 2 pressure injury measures [id number]. Pressure injury complicated by moisture associated dermatitis created when patient is in natural anatomical position, unable to visualize injury and the injury creates a symmetrical mirror image to the injury of the left buttock. Wound base 100% exposed dermis. Periwound skin with circumferential maceration, no induration, no increased warmth , no erythema. Goal of care: protect from moisture, hydrophilic healing.

## 2019-11-26 NOTE — PROGRESS NOTE ADULT - ASSESSMENT
89 yo M admitted with gross hematuria from OSH; was there 3 weeks;  had hyperbaric O2 treatment; went to OR;  now on alum CBI    Plan:  - OOB with P.T.  - cont alum CBI  - check labs

## 2019-11-26 NOTE — PROGRESS NOTE ADULT - PROBLEM SELECTOR PLAN 8
started discussion of ACP, wife very supportive, copy of MOLST given to pt/wife to review with family, will f/u.

## 2019-11-26 NOTE — CONSULT NOTE ADULT - SUBJECTIVE AND OBJECTIVE BOX
Pt known to our practice (Dr. Senthil Prasad and our service at [facility])  HPI  Briefly, 89M CAD, s/p CABG 1998, stent 2005, CVA with R weakness and expressive aphasia (24y ago), HTN, CKD, HLD, prostate cancer treated >20 years ago, radiation cystitis treated with hyperbaric oxygen in 2018, developed recurrent hematuria in July 2019.  Has been at [facility] since 10/31 with hematuria received 15 units PRBCs, gone to OR and had 15 hyperbaric oxygen treatments.  Unfortunately hematuria persists.  Transferred here for further urologic management.      Since transfer to [facility] has had 2 units PRBC.  Has continued pain due to bladder spasms and frequent Lal irrigation requirements.      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 [facility]    SOCIAL HISTORY:  , lives at home with wife, occasional would use cane  retired from [employer]      FAMILY HISTORY:  [x ] No pertinent family history in first degree relatives        ALBUTerol    90 MICROgram(s) HFA Inhaler 2 Puff(s) Inhalation every 6 hours  aluminum sulfate 1% Irrigation in sterile water 1 Application(s) Topical daily  atorvastatin 20 milliGRAM(s) Oral at bedtime  belladonna 16.2 mG/opium 30 mg Suppository 1 Suppository(s) Rectal every 6 hours PRN  calcium carbonate   1250 mG (OsCal) 1 Tablet(s) Oral daily  cyanocobalamin 1000 MICROGram(s) Oral daily  epoetin gianfranco Injectable 39228 Unit(s) SubCutaneous <User Schedule>  ferrous    sulfate 325 milliGRAM(s) Oral daily  finasteride 5 milliGRAM(s) Oral daily  folic acid 1 milliGRAM(s) Oral daily  heparin  Injectable 5000 Unit(s) SubCutaneous every 8 hours  influenza   Vaccine 0.5 milliLiter(s) IntraMuscular once  NIFEdipine XL 30 milliGRAM(s) Oral daily  oxybutynin 5 milliGRAM(s) Oral two times a day  oxyCODONE    IR 5 milliGRAM(s) Oral every 4 hours PRN  senna 2 Tablet(s) Oral at bedtime  sodium chloride 0.9% lock flush 3 milliLiter(s) IV Push every 8 hours  tamsulosin 0.4 milliGRAM(s) Oral at bedtime  tiotropium 18 MICROgram(s) Capsule 1 Capsule(s) Inhalation daily      No Known Allergies      ROS otherwise negative     T(C): 36.6 (11-26-19 @ 17:32), Max: 37 (11-26-19 @ 01:41)  HR: 107 (11-26-19 @ 17:32) (75 - 107)  BP: 154/86 (11-26-19 @ 17:32) (115/55 - 154/86)  RR: 17 (11-26-19 @ 17:32) (16 - 17)  SpO2: 100% (11-26-19 @ 17:32) (94% - 100%)  PHYSICAL EXAM  Gen:  sitting up, in severe distress due to bladder spasm  H:  anicteric, eomi  CV:  RRR, S1, S2  Lungs:  CTA b/l  Ab soft/nt/nd;  Lal draining dark red urine  Ext:  no edema                          9.6    10.53 )-----------( 229      ( 26 Nov 2019 06:35 )             30.1                         7.2    7.41  )-----------( 208      ( 25 Nov 2019 15:15 )             23.6   11-26    140  |  110<H>  |  24<H>  ----------------------------<  162<H>  3.9   |  19<L>  |  1.39<H>    Ca    7.9<L>      26 Nov 2019 06:35

## 2019-11-27 LAB
ANION GAP SERPL CALC-SCNC: 10 MMO/L — SIGNIFICANT CHANGE UP (ref 7–14)
BUN SERPL-MCNC: 30 MG/DL — HIGH (ref 7–23)
CALCIUM SERPL-MCNC: 8 MG/DL — LOW (ref 8.4–10.5)
CHLORIDE SERPL-SCNC: 106 MMOL/L — SIGNIFICANT CHANGE UP (ref 98–107)
CO2 SERPL-SCNC: 19 MMOL/L — LOW (ref 22–31)
CREAT SERPL-MCNC: 1.55 MG/DL — HIGH (ref 0.5–1.3)
GLUCOSE SERPL-MCNC: 103 MG/DL — HIGH (ref 70–99)
HCT VFR BLD CALC: 23.9 % — LOW (ref 39–50)
HGB BLD-MCNC: 7.3 G/DL — LOW (ref 13–17)
MCHC RBC-ENTMCNC: 27.9 PG — SIGNIFICANT CHANGE UP (ref 27–34)
MCHC RBC-ENTMCNC: 30.5 % — LOW (ref 32–36)
MCV RBC AUTO: 91.2 FL — SIGNIFICANT CHANGE UP (ref 80–100)
NRBC # FLD: 0 K/UL — SIGNIFICANT CHANGE UP (ref 0–0)
PLATELET # BLD AUTO: 267 K/UL — SIGNIFICANT CHANGE UP (ref 150–400)
PMV BLD: 9.7 FL — SIGNIFICANT CHANGE UP (ref 7–13)
POTASSIUM SERPL-MCNC: 4.5 MMOL/L — SIGNIFICANT CHANGE UP (ref 3.5–5.3)
POTASSIUM SERPL-SCNC: 4.5 MMOL/L — SIGNIFICANT CHANGE UP (ref 3.5–5.3)
RBC # BLD: 2.62 M/UL — LOW (ref 4.2–5.8)
RBC # FLD: 18.2 % — HIGH (ref 10.3–14.5)
SODIUM SERPL-SCNC: 135 MMOL/L — SIGNIFICANT CHANGE UP (ref 135–145)
WBC # BLD: 10.14 K/UL — SIGNIFICANT CHANGE UP (ref 3.8–10.5)
WBC # FLD AUTO: 10.14 K/UL — SIGNIFICANT CHANGE UP (ref 3.8–10.5)

## 2019-11-27 PROCEDURE — 99231 SBSQ HOSP IP/OBS SF/LOW 25: CPT

## 2019-11-27 PROCEDURE — 99233 SBSQ HOSP IP/OBS HIGH 50: CPT

## 2019-11-27 RX ORDER — ACETAMINOPHEN 500 MG
1000 TABLET ORAL ONCE
Refills: 0 | Status: COMPLETED | OUTPATIENT
Start: 2019-11-27 | End: 2019-11-27

## 2019-11-27 RX ADMIN — SODIUM CHLORIDE 3 MILLILITER(S): 9 INJECTION INTRAMUSCULAR; INTRAVENOUS; SUBCUTANEOUS at 13:21

## 2019-11-27 RX ADMIN — TAMSULOSIN HYDROCHLORIDE 0.4 MILLIGRAM(S): 0.4 CAPSULE ORAL at 21:20

## 2019-11-27 RX ADMIN — Medication 30 MILLIGRAM(S): at 06:50

## 2019-11-27 RX ADMIN — PREGABALIN 1000 MICROGRAM(S): 225 CAPSULE ORAL at 13:20

## 2019-11-27 RX ADMIN — Medication 1 TABLET(S): at 13:20

## 2019-11-27 RX ADMIN — ALBUTEROL 2 PUFF(S): 90 AEROSOL, METERED ORAL at 17:41

## 2019-11-27 RX ADMIN — Medication 400 MILLIGRAM(S): at 21:20

## 2019-11-27 RX ADMIN — ATORVASTATIN CALCIUM 20 MILLIGRAM(S): 80 TABLET, FILM COATED ORAL at 21:21

## 2019-11-27 RX ADMIN — Medication 5 MILLIGRAM(S): at 17:41

## 2019-11-27 RX ADMIN — Medication 1000 MILLIGRAM(S): at 21:32

## 2019-11-27 RX ADMIN — ALBUTEROL 2 PUFF(S): 90 AEROSOL, METERED ORAL at 06:50

## 2019-11-27 RX ADMIN — ATROPA BELLADONNA AND OPIUM 1 SUPPOSITORY(S): 16.2; 6 SUPPOSITORY RECTAL at 10:51

## 2019-11-27 RX ADMIN — ATROPA BELLADONNA AND OPIUM 1 SUPPOSITORY(S): 16.2; 6 SUPPOSITORY RECTAL at 17:54

## 2019-11-27 RX ADMIN — FINASTERIDE 5 MILLIGRAM(S): 5 TABLET, FILM COATED ORAL at 13:20

## 2019-11-27 RX ADMIN — TIOTROPIUM BROMIDE 1 CAPSULE(S): 18 CAPSULE ORAL; RESPIRATORY (INHALATION) at 10:48

## 2019-11-27 RX ADMIN — ALBUTEROL 2 PUFF(S): 90 AEROSOL, METERED ORAL at 21:21

## 2019-11-27 RX ADMIN — HEPARIN SODIUM 5000 UNIT(S): 5000 INJECTION INTRAVENOUS; SUBCUTANEOUS at 06:50

## 2019-11-27 RX ADMIN — SODIUM CHLORIDE 3 MILLILITER(S): 9 INJECTION INTRAMUSCULAR; INTRAVENOUS; SUBCUTANEOUS at 06:20

## 2019-11-27 RX ADMIN — Medication 325 MILLIGRAM(S): at 13:20

## 2019-11-27 RX ADMIN — HEPARIN SODIUM 5000 UNIT(S): 5000 INJECTION INTRAVENOUS; SUBCUTANEOUS at 13:21

## 2019-11-27 RX ADMIN — Medication 5 MILLIGRAM(S): at 06:50

## 2019-11-27 RX ADMIN — Medication 1 MILLIGRAM(S): at 13:20

## 2019-11-27 RX ADMIN — ERYTHROPOIETIN 10000 UNIT(S): 10000 INJECTION, SOLUTION INTRAVENOUS; SUBCUTANEOUS at 14:39

## 2019-11-27 RX ADMIN — HEPARIN SODIUM 5000 UNIT(S): 5000 INJECTION INTRAVENOUS; SUBCUTANEOUS at 21:20

## 2019-11-27 RX ADMIN — SODIUM CHLORIDE 3 MILLILITER(S): 9 INJECTION INTRAMUSCULAR; INTRAVENOUS; SUBCUTANEOUS at 21:27

## 2019-11-27 RX ADMIN — ALBUTEROL 2 PUFF(S): 90 AEROSOL, METERED ORAL at 10:48

## 2019-11-27 NOTE — PROGRESS NOTE ADULT - SUBJECTIVE AND OBJECTIVE BOX
Subjective Pt no complaints / overnight  events    Objective:    Vital signs  T(F): , Max: 98.2 (11-27-19 @ 02:20)  HR: 91 (11-27-19 @ 06:49)  BP: 128/53 (11-27-19 @ 06:49)  SpO2: 97% (11-27-19 @ 06:49)  Wt(kg): --    Output     11-26 @ 07:01  -  11-27 @ 07:00  --------------------------------------------------------  IN: 0 mL / OUT: 1550 mL / NET: -1550 mL    Fol= 1550    Gen awake alert NAD  Abd soft   GE beach in place Alum in progress color light brown / red    Labs      11-27 @ 06:25    WBC 10.14 / Hct 23.9  / SCr 1.55     11-26 @ 06:35    WBC 10.53 / Hct 30.1  / SCr 1.39

## 2019-11-27 NOTE — PROGRESS NOTE ADULT - PROBLEM SELECTOR PLAN 1
from bleeding from radiation cystitis - trend H&H, per onc aim to keep Hb>8 given cardiac history,  f/u   - on iron, B12, fa  - epo tiw. from bleeding presumed from radiation cystitis - trend H&H, per onc aim to keep Hb>8 given cardiac history,  f/u   - on iron, B12, fa  - epo tiw.

## 2019-11-27 NOTE — PROGRESS NOTE ADULT - ASSESSMENT
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  Has been at [facility] since 10/31 with hematuria received 15 units PRBCs, gone to OR and had 15 hyperbaric oxygen treatments.  Transferred here for further urologic management.  He was also noted to have low grade noninvasive UCC of bladder in 10/2018      -urology management appreciated for RT cystitis.  ?plan for intravesicular formaldehyde   -d/w urology service    Anemia  -c/w oral iron  -c/w b12 and folate  -transfuse to keep Hgb > 8 given cardiac history.  -plan for prbc x 1 today    d//w Dr. Eugenia Perera MD  Hematology/Oncology  Cell:  [phone]  Office Phone: [phone]  Office Fax:  [phone] [address] [id number]

## 2019-11-27 NOTE — PROGRESS NOTE ADULT - PROBLEM SELECTOR PLAN 2
management per urology - CBI, Alum  - on flomax/Proscar. management per urology - CBI, Alum  - on flomax/Proscar.  Low grade noninvasive bladder CA diagnosed recently

## 2019-11-27 NOTE — PROGRESS NOTE ADULT - SUBJECTIVE AND OBJECTIVE BOX
he is feeling better this AM with less bladder spasms  Beach to drain    ALBUTerol    90 MICROgram(s) HFA Inhaler 2 Puff(s) Inhalation every 6 hours  aluminum sulfate 1% Irrigation in sterile water 1 Application(s) Topical daily  atorvastatin 20 milliGRAM(s) Oral at bedtime  belladonna 16.2 mG/opium 30 mg Suppository 1 Suppository(s) Rectal every 6 hours PRN  calcium carbonate   1250 mG (OsCal) 1 Tablet(s) Oral daily  cyanocobalamin 1000 MICROGram(s) Oral daily  epoetin gianfranco Injectable 70997 Unit(s) SubCutaneous <User Schedule>  ferrous    sulfate 325 milliGRAM(s) Oral daily  finasteride 5 milliGRAM(s) Oral daily  folic acid 1 milliGRAM(s) Oral daily  heparin  Injectable 5000 Unit(s) SubCutaneous every 8 hours  influenza   Vaccine 0.5 milliLiter(s) IntraMuscular once  NIFEdipine XL 30 milliGRAM(s) Oral daily  oxybutynin 5 milliGRAM(s) Oral two times a day  oxyCODONE    IR 5 milliGRAM(s) Oral every 4 hours PRN  senna 2 Tablet(s) Oral at bedtime  sodium chloride 0.9% lock flush 3 milliLiter(s) IV Push every 8 hours  tamsulosin 0.4 milliGRAM(s) Oral at bedtime  tiotropium 18 MICROgram(s) Capsule 1 Capsule(s) Inhalation daily      No Known Allergies      ROS otherwise negative     T(C): 36.6 (11-27-19 @ 09:28), Max: 36.8 (11-27-19 @ 02:20)  HR: 102 (11-27-19 @ 09:28) (85 - 107)  BP: 112/43 (11-27-19 @ 09:28) (112/43 - 154/86)  RR: 17 (11-27-19 @ 09:28) (16 - 18)  SpO2: 97% (11-27-19 @ 09:28) (97% - 100%)  PHYSICAL EXAM  Gen:  laying in bed, nad  H:  anicteric, eomi  CV:  RRR, S1, S2  Lungs:  CTA b/l  Ab soft/nt/nd;  beach draning dark red urine  Ext:  no edema                          7.3    10.14 )-----------( 267      ( 27 Nov 2019 06:25 )             23.9                         9.6    10.53 )-----------( 229      ( 26 Nov 2019 06:35 )             30.1                         7.2    7.41  )-----------( 208      ( 25 Nov 2019 15:15 )             23.6   11-27    135  |  106  |  30<H>  ----------------------------<  103<H>  4.5   |  19<L>  |  1.55<H>    Ca    8.0<L>      27 Nov 2019 06:25

## 2019-11-27 NOTE — PROGRESS NOTE ADULT - PROBLEM SELECTOR PLAN 1
Plan:  -Flush w Sterile H2O this AM  - OOB with P.T.  - cont alum CBI  - Hct decrease consider repeat vs. PRBC's  - f/u wound care

## 2019-11-27 NOTE — PROGRESS NOTE ADULT - ASSESSMENT
89 yo M HOD #2  admitted with gross hematuria from OSH; was there 3 weeks;  had hyperbaric O2 treatment; went to OR;  now on alum CBI

## 2019-11-27 NOTE — PROGRESS NOTE ADULT - ASSESSMENT
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  Has been at [facility] since 10/31 with hematuria received 15 units PRBCs, gone to OR and had 15 hyperbaric oxygen treatments.  Per oncology pt has established diagnosis of low grade noninvasive bladder cancer from [facility] 10/18/19 with Dr. García Best.  Transferred for persistent hematuria, on trial of Alum

## 2019-11-27 NOTE — PROGRESS NOTE ADULT - SUBJECTIVE AND OBJECTIVE BOX
[facility] Division of Hospital Medicine  Andreia Bello MD  Pager (M-F, 8A-5P):  In-house pager [phone]; Long-range pager [phone]  Other Times:  Please page Hospitalist in Charge -  In-house pager [phone]    Patient is a 89y old  Male who presents with a chief complaint of Hematuria (27 Nov 2019 08:26)      SUBJECTIVE / OVERNIGHT EVENTS: No problems reported over night.   ADDITIONAL REVIEW OF SYSTEMS:    MEDICATIONS  (STANDING):  ALBUTerol    90 MICROgram(s) HFA Inhaler 2 Puff(s) Inhalation every 6 hours  aluminum sulfate 1% Irrigation in sterile water 1 Application(s) Topical daily  atorvastatin 20 milliGRAM(s) Oral at bedtime  calcium carbonate   1250 mG (OsCal) 1 Tablet(s) Oral daily  cyanocobalamin 1000 MICROGram(s) Oral daily  epoetin gianfranco Injectable 97017 Unit(s) SubCutaneous <User Schedule>  ferrous    sulfate 325 milliGRAM(s) Oral daily  finasteride 5 milliGRAM(s) Oral daily  folic acid 1 milliGRAM(s) Oral daily  heparin  Injectable 5000 Unit(s) SubCutaneous every 8 hours  influenza   Vaccine 0.5 milliLiter(s) IntraMuscular once  NIFEdipine XL 30 milliGRAM(s) Oral daily  oxybutynin 5 milliGRAM(s) Oral two times a day  senna 2 Tablet(s) Oral at bedtime  sodium chloride 0.9% lock flush 3 milliLiter(s) IV Push every 8 hours  tamsulosin 0.4 milliGRAM(s) Oral at bedtime  tiotropium 18 MICROgram(s) Capsule 1 Capsule(s) Inhalation daily    MEDICATIONS  (PRN):  belladonna 16.2 mG/opium 30 mg Suppository 1 Suppository(s) Rectal every 6 hours PRN severe pain  oxyCODONE    IR 5 milliGRAM(s) Oral every 4 hours PRN Severe Pain (7 - 10)      CAPILLARY BLOOD GLUCOSE        I&O's Summary    26 Nov 2019 07:01  -  27 Nov 2019 07:00  --------------------------------------------------------  IN: 0 mL / OUT: 1550 mL / NET: -1550 mL        PHYSICAL EXAM:  Vital Signs Last 24 Hrs  T(C): 36.8 (27 Nov 2019 06:49), Max: 36.8 (27 Nov 2019 02:20)  T(F): 98.2 (27 Nov 2019 06:49), Max: 98.2 (27 Nov 2019 02:20)  HR: 91 (27 Nov 2019 06:49) (85 - 107)  BP: 128/53 (27 Nov 2019 06:49) (116/58 - 154/86)  BP(mean): --  RR: 17 (27 Nov 2019 06:49) (16 - 18)  SpO2: 97% (27 Nov 2019 06:49) (97% - 100%)    PHYSICAL EXAM:  GENERAL: elderly WM, sitting up in bed, NAD  HEAD:  Atraumatic, Normocephalic  EYES: EOMI, PERRLA, conjunctiva and sclera clear  ENMT: Moist mucous membranes  NECK: Supple, No JVD  RESPIRATORY: Clear to auscultation bilaterally; No rales, rhonchi, wheezing, or rubs  CARDIOVASCULAR: Regular rate and rhythm; 3/6 systolic murmur  GASTROINTESTINAL: Soft, Nontender, Nondistended; Bowel sounds present  GENITOURINARY: beach, brown urine  EXTREMITIES:  2+ Peripheral Pulses, No clubbing, cyanosis, or edema  NERVOUS SYSTEM: dysarthria, mild expressive aphasia, R sided weakness  HEME/LYMPH: No lymphadenopathy noted  SKIN: No rashes or lesions    LABS:                        7.3    10.14 )-----------( 267      ( 27 Nov 2019 06:25 )             23.9     11-27    135  |  106  |  30<H>  ----------------------------<  103<H>  4.5   |  19<L>  |  1.55<H>    Ca    8.0<L>      27 Nov 2019 06:25                  RADIOLOGY & ADDITIONAL TESTS:  Results Reviewed:   Imaging Personally Reviewed:  Electrocardiogram Personally Reviewed:    COORDINATION OF CARE:  Care Discussed with Consultants/Other Providers [Y/N]: urology re overall care  Prior or Outpatient Records Reviewed [Y/N]: [facility] Division of Hospital Medicine  Andreia Bello MD  Pager (M-F, 8A-5P):  In-house pager [phone]; Long-range pager [phone]  Other Times:  Please page Hospitalist in Charge -  In-house pager [phone]    Patient is a 89y old  Male who presents with a chief complaint of Hematuria (27 Nov 2019 08:26)      SUBJECTIVE / OVERNIGHT EVENTS: No problems reported over night. Intermittent bladder spasms, did sleep better last night.  No chest pain, SOB, nausea.  Ate breakfast.  Son at bedside.  ADDITIONAL REVIEW OF SYSTEMS:    MEDICATIONS  (STANDING):  ALBUTerol    90 MICROgram(s) HFA Inhaler 2 Puff(s) Inhalation every 6 hours  aluminum sulfate 1% Irrigation in sterile water 1 Application(s) Topical daily  atorvastatin 20 milliGRAM(s) Oral at bedtime  calcium carbonate   1250 mG (OsCal) 1 Tablet(s) Oral daily  cyanocobalamin 1000 MICROGram(s) Oral daily  epoetin gianfranco Injectable 03349 Unit(s) SubCutaneous <User Schedule>  ferrous    sulfate 325 milliGRAM(s) Oral daily  finasteride 5 milliGRAM(s) Oral daily  folic acid 1 milliGRAM(s) Oral daily  heparin  Injectable 5000 Unit(s) SubCutaneous every 8 hours  influenza   Vaccine 0.5 milliLiter(s) IntraMuscular once  NIFEdipine XL 30 milliGRAM(s) Oral daily  oxybutynin 5 milliGRAM(s) Oral two times a day  senna 2 Tablet(s) Oral at bedtime  sodium chloride 0.9% lock flush 3 milliLiter(s) IV Push every 8 hours  tamsulosin 0.4 milliGRAM(s) Oral at bedtime  tiotropium 18 MICROgram(s) Capsule 1 Capsule(s) Inhalation daily    MEDICATIONS  (PRN):  belladonna 16.2 mG/opium 30 mg Suppository 1 Suppository(s) Rectal every 6 hours PRN severe pain  oxyCODONE    IR 5 milliGRAM(s) Oral every 4 hours PRN Severe Pain (7 - 10)      CAPILLARY BLOOD GLUCOSE        I&O's Summary    26 Nov 2019 07:01  -  27 Nov 2019 07:00  --------------------------------------------------------  IN: 0 mL / OUT: 1550 mL / NET: -1550 mL        PHYSICAL EXAM:  Vital Signs Last 24 Hrs  T(C): 36.8 (27 Nov 2019 06:49), Max: 36.8 (27 Nov 2019 02:20)  T(F): 98.2 (27 Nov 2019 06:49), Max: 98.2 (27 Nov 2019 02:20)  HR: 91 (27 Nov 2019 06:49) (85 - 107)  BP: 128/53 (27 Nov 2019 06:49) (116/58 - 154/86)  BP(mean): --  RR: 17 (27 Nov 2019 06:49) (16 - 18)  SpO2: 97% (27 Nov 2019 06:49) (97% - 100%)    PHYSICAL EXAM:  GENERAL: elderly WM, sitting up in bed, NAD  HEAD:  Atraumatic, Normocephalic  EYES: EOMI, PERRLA, conjunctiva and sclera clear  ENMT: Moist mucous membranes  NECK: Supple, No JVD  RESPIRATORY: Clear to auscultation bilaterally; No rales, rhonchi, wheezing, or rubs  CARDIOVASCULAR: Regular rate and rhythm; 3/6 systolic murmur  GASTROINTESTINAL: Soft, Nontender, Nondistended; Bowel sounds present  GENITOURINARY: beach, brown urine  EXTREMITIES:  2+ Peripheral Pulses, No clubbing, cyanosis, or edema  NERVOUS SYSTEM: dysarthria, mild expressive aphasia, R sided weakness  HEME/LYMPH: No lymphadenopathy noted  SKIN: No rashes or lesions    LABS:                        7.3    10.14 )-----------( 267      ( 27 Nov 2019 06:25 )             23.9     11-27    135  |  106  |  30<H>  ----------------------------<  103<H>  4.5   |  19<L>  |  1.55<H>    Ca    8.0<L>      27 Nov 2019 06:25                  RADIOLOGY & ADDITIONAL TESTS:  Results Reviewed:   Imaging Personally Reviewed:  Electrocardiogram Personally Reviewed:    COORDINATION OF CARE:  Care Discussed with Consultants/Other Providers [Y/N]: urology re overall care  Prior or Outpatient Records Reviewed [Y/N]:

## 2019-11-28 LAB
ANION GAP SERPL CALC-SCNC: 8 MMO/L — SIGNIFICANT CHANGE UP (ref 7–14)
APTT BLD: 27.8 SEC — SIGNIFICANT CHANGE UP (ref 27.5–36.3)
BLD GP AB SCN SERPL QL: NEGATIVE — SIGNIFICANT CHANGE UP
BUN SERPL-MCNC: 30 MG/DL — HIGH (ref 7–23)
CALCIUM SERPL-MCNC: 7.7 MG/DL — LOW (ref 8.4–10.5)
CHLORIDE SERPL-SCNC: 104 MMOL/L — SIGNIFICANT CHANGE UP (ref 98–107)
CO2 SERPL-SCNC: 21 MMOL/L — LOW (ref 22–31)
CREAT SERPL-MCNC: 1.51 MG/DL — HIGH (ref 0.5–1.3)
GLUCOSE SERPL-MCNC: 127 MG/DL — HIGH (ref 70–99)
HCT VFR BLD CALC: 25.8 % — LOW (ref 39–50)
HCT VFR BLD CALC: 27.5 % — LOW (ref 39–50)
HGB BLD-MCNC: 8.4 G/DL — LOW (ref 13–17)
HGB BLD-MCNC: 8.8 G/DL — LOW (ref 13–17)
INR BLD: 1.03 — SIGNIFICANT CHANGE UP (ref 0.88–1.17)
MCHC RBC-ENTMCNC: 28.4 PG — SIGNIFICANT CHANGE UP (ref 27–34)
MCHC RBC-ENTMCNC: 28.5 PG — SIGNIFICANT CHANGE UP (ref 27–34)
MCHC RBC-ENTMCNC: 32 % — SIGNIFICANT CHANGE UP (ref 32–36)
MCHC RBC-ENTMCNC: 32.6 % — SIGNIFICANT CHANGE UP (ref 32–36)
MCV RBC AUTO: 87.2 FL — SIGNIFICANT CHANGE UP (ref 80–100)
MCV RBC AUTO: 89 FL — SIGNIFICANT CHANGE UP (ref 80–100)
NRBC # FLD: 0 K/UL — SIGNIFICANT CHANGE UP (ref 0–0)
NRBC # FLD: 0.02 K/UL — SIGNIFICANT CHANGE UP (ref 0–0)
PLATELET # BLD AUTO: 248 K/UL — SIGNIFICANT CHANGE UP (ref 150–400)
PLATELET # BLD AUTO: 273 K/UL — SIGNIFICANT CHANGE UP (ref 150–400)
PMV BLD: 8.9 FL — SIGNIFICANT CHANGE UP (ref 7–13)
PMV BLD: 9.1 FL — SIGNIFICANT CHANGE UP (ref 7–13)
POTASSIUM SERPL-MCNC: 4 MMOL/L — SIGNIFICANT CHANGE UP (ref 3.5–5.3)
POTASSIUM SERPL-SCNC: 4 MMOL/L — SIGNIFICANT CHANGE UP (ref 3.5–5.3)
PROTHROM AB SERPL-ACNC: 11.5 SEC — SIGNIFICANT CHANGE UP (ref 9.8–13.1)
RBC # BLD: 2.96 M/UL — LOW (ref 4.2–5.8)
RBC # BLD: 3.09 M/UL — LOW (ref 4.2–5.8)
RBC # FLD: 18 % — HIGH (ref 10.3–14.5)
RBC # FLD: 18.1 % — HIGH (ref 10.3–14.5)
RH IG SCN BLD-IMP: POSITIVE — SIGNIFICANT CHANGE UP
SODIUM SERPL-SCNC: 133 MMOL/L — LOW (ref 135–145)
WBC # BLD: 10.91 K/UL — HIGH (ref 3.8–10.5)
WBC # BLD: 9.74 K/UL — SIGNIFICANT CHANGE UP (ref 3.8–10.5)
WBC # FLD AUTO: 10.91 K/UL — HIGH (ref 3.8–10.5)
WBC # FLD AUTO: 9.74 K/UL — SIGNIFICANT CHANGE UP (ref 3.8–10.5)

## 2019-11-28 PROCEDURE — 99231 SBSQ HOSP IP/OBS SF/LOW 25: CPT

## 2019-11-28 PROCEDURE — 99233 SBSQ HOSP IP/OBS HIGH 50: CPT

## 2019-11-28 RX ORDER — ACETAMINOPHEN 500 MG
1000 TABLET ORAL EVERY 6 HOURS
Refills: 0 | Status: COMPLETED | OUTPATIENT
Start: 2019-11-28 | End: 2019-11-29

## 2019-11-28 RX ORDER — ACETAMINOPHEN 500 MG
1000 TABLET ORAL ONCE
Refills: 0 | Status: COMPLETED | OUTPATIENT
Start: 2019-11-28 | End: 2019-11-28

## 2019-11-28 RX ADMIN — Medication 1000 MILLIGRAM(S): at 06:00

## 2019-11-28 RX ADMIN — Medication 5 MILLIGRAM(S): at 05:35

## 2019-11-28 RX ADMIN — Medication 1000 MILLIGRAM(S): at 13:50

## 2019-11-28 RX ADMIN — Medication 5 MILLIGRAM(S): at 16:40

## 2019-11-28 RX ADMIN — TIOTROPIUM BROMIDE 1 CAPSULE(S): 18 CAPSULE ORAL; RESPIRATORY (INHALATION) at 10:17

## 2019-11-28 RX ADMIN — TAMSULOSIN HYDROCHLORIDE 0.4 MILLIGRAM(S): 0.4 CAPSULE ORAL at 22:12

## 2019-11-28 RX ADMIN — FINASTERIDE 5 MILLIGRAM(S): 5 TABLET, FILM COATED ORAL at 13:35

## 2019-11-28 RX ADMIN — ATROPA BELLADONNA AND OPIUM 1 SUPPOSITORY(S): 16.2; 6 SUPPOSITORY RECTAL at 23:10

## 2019-11-28 RX ADMIN — ALBUTEROL 2 PUFF(S): 90 AEROSOL, METERED ORAL at 16:41

## 2019-11-28 RX ADMIN — Medication 400 MILLIGRAM(S): at 05:35

## 2019-11-28 RX ADMIN — Medication 400 MILLIGRAM(S): at 19:11

## 2019-11-28 RX ADMIN — PREGABALIN 1000 MICROGRAM(S): 225 CAPSULE ORAL at 13:35

## 2019-11-28 RX ADMIN — ATROPA BELLADONNA AND OPIUM 1 SUPPOSITORY(S): 16.2; 6 SUPPOSITORY RECTAL at 11:08

## 2019-11-28 RX ADMIN — ATROPA BELLADONNA AND OPIUM 1 SUPPOSITORY(S): 16.2; 6 SUPPOSITORY RECTAL at 12:00

## 2019-11-28 RX ADMIN — Medication 1 TABLET(S): at 13:35

## 2019-11-28 RX ADMIN — Medication 325 MILLIGRAM(S): at 13:35

## 2019-11-28 RX ADMIN — HEPARIN SODIUM 5000 UNIT(S): 5000 INJECTION INTRAVENOUS; SUBCUTANEOUS at 22:12

## 2019-11-28 RX ADMIN — SODIUM CHLORIDE 3 MILLILITER(S): 9 INJECTION INTRAMUSCULAR; INTRAVENOUS; SUBCUTANEOUS at 13:31

## 2019-11-28 RX ADMIN — Medication 1 MILLIGRAM(S): at 13:35

## 2019-11-28 RX ADMIN — ATROPA BELLADONNA AND OPIUM 1 SUPPOSITORY(S): 16.2; 6 SUPPOSITORY RECTAL at 01:00

## 2019-11-28 RX ADMIN — SENNA PLUS 2 TABLET(S): 8.6 TABLET ORAL at 22:12

## 2019-11-28 RX ADMIN — Medication 400 MILLIGRAM(S): at 13:35

## 2019-11-28 RX ADMIN — Medication 1000 MILLIGRAM(S): at 20:10

## 2019-11-28 RX ADMIN — ATROPA BELLADONNA AND OPIUM 1 SUPPOSITORY(S): 16.2; 6 SUPPOSITORY RECTAL at 22:12

## 2019-11-28 RX ADMIN — ALBUTEROL 2 PUFF(S): 90 AEROSOL, METERED ORAL at 22:12

## 2019-11-28 RX ADMIN — ATORVASTATIN CALCIUM 20 MILLIGRAM(S): 80 TABLET, FILM COATED ORAL at 22:12

## 2019-11-28 RX ADMIN — SODIUM CHLORIDE 3 MILLILITER(S): 9 INJECTION INTRAMUSCULAR; INTRAVENOUS; SUBCUTANEOUS at 21:11

## 2019-11-28 RX ADMIN — SODIUM CHLORIDE 3 MILLILITER(S): 9 INJECTION INTRAMUSCULAR; INTRAVENOUS; SUBCUTANEOUS at 05:43

## 2019-11-28 RX ADMIN — ALBUTEROL 2 PUFF(S): 90 AEROSOL, METERED ORAL at 05:35

## 2019-11-28 RX ADMIN — ALBUTEROL 2 PUFF(S): 90 AEROSOL, METERED ORAL at 10:18

## 2019-11-28 RX ADMIN — HEPARIN SODIUM 5000 UNIT(S): 5000 INJECTION INTRAVENOUS; SUBCUTANEOUS at 13:35

## 2019-11-28 RX ADMIN — Medication 30 MILLIGRAM(S): at 05:35

## 2019-11-28 RX ADMIN — ATROPA BELLADONNA AND OPIUM 1 SUPPOSITORY(S): 16.2; 6 SUPPOSITORY RECTAL at 00:20

## 2019-11-28 RX ADMIN — HEPARIN SODIUM 5000 UNIT(S): 5000 INJECTION INTRAVENOUS; SUBCUTANEOUS at 05:34

## 2019-11-28 NOTE — PROGRESS NOTE ADULT - ASSESSMENT
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  Has been at [facility] since 10/31 with hematuria received 15 units PRBCs, gone to OR and had 15 hyperbaric oxygen treatments.  Per oncology pt has established diagnosis of low grade noninvasive bladder cancer from [facility] 10/18/19 with Dr. García Best.  Transferred for persistent hematuria, s/p trial of Alum

## 2019-11-28 NOTE — PROGRESS NOTE ADULT - PROBLEM SELECTOR PLAN 2
management per urology - CBI, Alum  - on flomax/Proscar.  Low grade noninvasive bladder CA diagnosed recently

## 2019-11-28 NOTE — PROGRESS NOTE ADULT - ASSESSMENT
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  Has been at [facility] since 10/31 with hematuria received 15 units PRBCs, gone to OR and had 15 hyperbaric oxygen treatments.  Transferred here for further urologic management.  He was also noted to have low grade noninvasive UCC of bladder in 10/2018      -urology management appreciated for RT cystitis.  ?plan for intravesicular formaldehyde   -d/w urology service    Anemia  -c/w oral iron  -c/w b12 and folate  -transfuse to keep Hgb > 8 given cardiac history.  -s/p PRBC yest with adequate response today    Thank you for the courtesy of this consultation and we will continue to follow.    Devaughn Boland MD  [facility]  Cell: [phone]

## 2019-11-28 NOTE — PROGRESS NOTE ADULT - ASSESSMENT
89 yo M HOD #2  admitted with gross hematuria from OSH; was there 3 weeks;  had hyperbaric O2 treatment; went to OR; alum x2 CBI 11/26-11/27. 11/28 - NS CBI.    Plan:  -AM CBC/BMP, type and screen  -OOB/PT  -Reg diet  -Cont CBI, check color, wean as tolerated  -F/u med, PT, wound care  -Dispo: IDA 89 yo M admitted with gross hematuria likely 2/2 radiation cystitis, less likely due to hx low grade noninvasiva bladder cancer (resected) transferred from OSH; was there 3 weeks;  had hyperbaric O2 treatment; went to OR; alum x2 CBI 11/26-11/27. 11/28 - NS CBI.    Plan:  -AM CBC/BMP, type and screen  -OOB/PT  -Reg diet  -Cont CBI, check color, wean as tolerated  -F/u med, PT, wound care  -Dispo: IDA

## 2019-11-28 NOTE — PROGRESS NOTE ADULT - SUBJECTIVE AND OBJECTIVE BOX
Subjective  Finished 2nd alum yesterday and changed to NS CBI, fast gtt. CBI remained clear pink on fast gtt overnight. S/p 2u PRBC yesterday. No acute issues overnight.    Objective    Vital signs  T(F): , Max: 98.9 (11-28-19 @ 05:31)  HR: 91 (11-28-19 @ 05:31)  BP: 120/51 (11-28-19 @ 05:31)  SpO2: 96% (11-28-19 @ 05:31)  Wt(kg): --    Output       Gen: NAD  Abd: S, NT, ND  : CBI, clear pink, fast gtt      Labs    11-27 @ 06:25    WBC 10.14 / Hct 23.9  / SCr 1.55           Urine Cx: ?  Blood Cx: ?    Imaging

## 2019-11-28 NOTE — PROGRESS NOTE ADULT - PROBLEM SELECTOR PLAN 1
from bleeding presumed from radiation cystitis - trend H&H, per onc aim to keep Hb>8 given cardiac history,  f/u   - on iron, B12, fa  - epo tiw.

## 2019-11-28 NOTE — PROGRESS NOTE ADULT - SUBJECTIVE AND OBJECTIVE BOX
[facility] Division of Hospital Medicine  Andreia Bello MD  Pager (M-F, 8A-5P):  In-house pager [phone]; Long-range pager [phone]  Other Times:  Please page Hospitalist in Charge -  In-house pager [phone]    Patient is a 89y old  Male who presents with a chief complaint of Hematuria (28 Nov 2019 13:42)      SUBJECTIVE / OVERNIGHT EVENTS:  ADDITIONAL REVIEW OF SYSTEMS:    MEDICATIONS  (STANDING):  acetaminophen  IVPB .. 1000 milliGRAM(s) IV Intermittent every 6 hours  ALBUTerol    90 MICROgram(s) HFA Inhaler 2 Puff(s) Inhalation every 6 hours  atorvastatin 20 milliGRAM(s) Oral at bedtime  calcium carbonate   1250 mG (OsCal) 1 Tablet(s) Oral daily  cyanocobalamin 1000 MICROGram(s) Oral daily  epoetin gianfranco Injectable 76017 Unit(s) SubCutaneous <User Schedule>  ferrous    sulfate 325 milliGRAM(s) Oral daily  finasteride 5 milliGRAM(s) Oral daily  folic acid 1 milliGRAM(s) Oral daily  heparin  Injectable 5000 Unit(s) SubCutaneous every 8 hours  influenza   Vaccine 0.5 milliLiter(s) IntraMuscular once  NIFEdipine XL 30 milliGRAM(s) Oral daily  oxybutynin 5 milliGRAM(s) Oral two times a day  senna 2 Tablet(s) Oral at bedtime  sodium chloride 0.9% lock flush 3 milliLiter(s) IV Push every 8 hours  tamsulosin 0.4 milliGRAM(s) Oral at bedtime  tiotropium 18 MICROgram(s) Capsule 1 Capsule(s) Inhalation daily    MEDICATIONS  (PRN):  belladonna 16.2 mG/opium 30 mg Suppository 1 Suppository(s) Rectal every 6 hours PRN severe pain  oxyCODONE    IR 5 milliGRAM(s) Oral every 4 hours PRN Severe Pain (7 - 10)      CAPILLARY BLOOD GLUCOSE        I&O's Summary      PHYSICAL EXAM:  Vital Signs Last 24 Hrs  T(C): 36.6 (28 Nov 2019 14:17), Max: 37.2 (28 Nov 2019 05:31)  T(F): 97.9 (28 Nov 2019 14:17), Max: 98.9 (28 Nov 2019 05:31)  HR: 91 (28 Nov 2019 14:17) (90 - 97)  BP: 125/54 (28 Nov 2019 14:17) (120/51 - 135/58)  BP(mean): --  RR: 17 (28 Nov 2019 14:17) (17 - 17)  SpO2: 97% (28 Nov 2019 14:17) (96% - 98%)    PHYSICAL EXAM:  GENERAL: elderly WM, lying flat  in bed, in mild discomfort  HEAD:  Atraumatic, Normocephalic  EYES: EOMI, PERRLA, conjunctiva and sclera clear  ENMT: Moist mucous membranes  NECK: Supple, No JVD  RESPIRATORY: Clear to auscultation bilaterally; No rales, rhonchi, wheezing, or rubs  CARDIOVASCULAR: Regular rate and rhythm; 3/6 systolic murmur  GASTROINTESTINAL: Soft, Nontender, Nondistended; Bowel sounds present  GENITOURINARY: beach/CBI, bloody  EXTREMITIES:  2+ Peripheral Pulses, No clubbing, cyanosis, or edema  NERVOUS SYSTEM: dysarthria, mild expressive aphasia, R sided weakness  HEME/LYMPH: No lymphadenopathy noted  SKIN: No rashes or lesions    LABS:                        8.8    10.91 )-----------( 273      ( 28 Nov 2019 14:44 )             27.5     11-28    133<L>  |  104  |  30<H>  ----------------------------<  127<H>  4.0   |  21<L>  |  1.51<H>    Ca    7.7<L>      28 Nov 2019 07:00      PT/INR - ( 28 Nov 2019 14:44 )   PT: 11.5 SEC;   INR: 1.03          PTT - ( 28 Nov 2019 14:44 )  PTT:27.8 SEC            RADIOLOGY & ADDITIONAL TESTS:  Results Reviewed:   Imaging Personally Reviewed:  Electrocardiogram Personally Reviewed:    COORDINATION OF CARE:  Care Discussed with Consultants/Other Providers [Y/N]: urology re overall care  Prior or Outpatient Records Reviewed [Y/N]:

## 2019-11-28 NOTE — PROGRESS NOTE ADULT - SUBJECTIVE AND OBJECTIVE BOX
Pt seen/examined, comfortable    MEDICATIONS  (STANDING):  acetaminophen  IVPB .. 1000 milliGRAM(s) IV Intermittent every 6 hours  ALBUTerol    90 MICROgram(s) HFA Inhaler 2 Puff(s) Inhalation every 6 hours  atorvastatin 20 milliGRAM(s) Oral at bedtime  calcium carbonate   1250 mG (OsCal) 1 Tablet(s) Oral daily  cyanocobalamin 1000 MICROGram(s) Oral daily  epoetin gianfranco Injectable 76707 Unit(s) SubCutaneous <User Schedule>  ferrous    sulfate 325 milliGRAM(s) Oral daily  finasteride 5 milliGRAM(s) Oral daily  folic acid 1 milliGRAM(s) Oral daily  heparin  Injectable 5000 Unit(s) SubCutaneous every 8 hours  influenza   Vaccine 0.5 milliLiter(s) IntraMuscular once  NIFEdipine XL 30 milliGRAM(s) Oral daily  oxybutynin 5 milliGRAM(s) Oral two times a day  senna 2 Tablet(s) Oral at bedtime  sodium chloride 0.9% lock flush 3 milliLiter(s) IV Push every 8 hours  tamsulosin 0.4 milliGRAM(s) Oral at bedtime  tiotropium 18 MICROgram(s) Capsule 1 Capsule(s) Inhalation daily    MEDICATIONS  (PRN):  belladonna 16.2 mG/opium 30 mg Suppository 1 Suppository(s) Rectal every 6 hours PRN severe pain  oxyCODONE    IR 5 milliGRAM(s) Oral every 4 hours PRN Severe Pain (7 - 10)      ROS  No fever, sweats, chills  No epistaxis, HA, sore throat  No CP, SOB, cough, sputum  No n/v/d, abd pain, melena, hematochezia  No edema  No rash  No anxiety  No back pain, joint pain  No bleeding, bruising  No dysuria, hematuria    Vital Signs Last 24 Hrs  T(C): 36.5 (28 Nov 2019 09:23), Max: 37.2 (28 Nov 2019 05:31)  T(F): 97.7 (28 Nov 2019 09:23), Max: 98.9 (28 Nov 2019 05:31)  HR: 91 (28 Nov 2019 05:31) (91 - 105)  BP: 121/45 (28 Nov 2019 09:23) (120/51 - 135/58)  BP(mean): --  RR: 17 (28 Nov 2019 09:23) (17 - 18)  SpO2: 98% (28 Nov 2019 09:23) (95% - 98%)    PE  NAD  Awake, alert  Anicteric, MMM  RRR  CTAB  Abd soft, NT, ND  No c/c/e  No rash grossly  FROM                          8.4    9.74  )-----------( 248      ( 28 Nov 2019 07:00 )             25.8       11-28    133<L>  |  104  |  30<H>  ----------------------------<  127<H>  4.0   |  21<L>  |  1.51<H>    Ca    7.7<L>      28 Nov 2019 07:00

## 2019-11-29 ENCOUNTER — TRANSCRIPTION ENCOUNTER (OUTPATIENT)
Age: 84
End: 2019-11-29

## 2019-11-29 LAB
ANION GAP SERPL CALC-SCNC: 10 MMO/L — SIGNIFICANT CHANGE UP (ref 7–14)
BUN SERPL-MCNC: 30 MG/DL — HIGH (ref 7–23)
CALCIUM SERPL-MCNC: 7.8 MG/DL — LOW (ref 8.4–10.5)
CHLORIDE SERPL-SCNC: 106 MMOL/L — SIGNIFICANT CHANGE UP (ref 98–107)
CO2 SERPL-SCNC: 19 MMOL/L — LOW (ref 22–31)
CREAT SERPL-MCNC: 1.51 MG/DL — HIGH (ref 0.5–1.3)
GLUCOSE SERPL-MCNC: 113 MG/DL — HIGH (ref 70–99)
HCT VFR BLD CALC: 27.4 % — LOW (ref 39–50)
HGB BLD-MCNC: 8.3 G/DL — LOW (ref 13–17)
MAGNESIUM SERPL-MCNC: 2 MG/DL — SIGNIFICANT CHANGE UP (ref 1.6–2.6)
MCHC RBC-ENTMCNC: 29.4 PG — SIGNIFICANT CHANGE UP (ref 27–34)
MCHC RBC-ENTMCNC: 30.3 % — LOW (ref 32–36)
MCV RBC AUTO: 97.2 FL — SIGNIFICANT CHANGE UP (ref 80–100)
NRBC # FLD: 0 K/UL — SIGNIFICANT CHANGE UP (ref 0–0)
PHOSPHATE SERPL-MCNC: 3.7 MG/DL — SIGNIFICANT CHANGE UP (ref 2.5–4.5)
PLATELET # BLD AUTO: 236 K/UL — SIGNIFICANT CHANGE UP (ref 150–400)
PMV BLD: 9.5 FL — SIGNIFICANT CHANGE UP (ref 7–13)
POTASSIUM SERPL-MCNC: 4.3 MMOL/L — SIGNIFICANT CHANGE UP (ref 3.5–5.3)
POTASSIUM SERPL-SCNC: 4.3 MMOL/L — SIGNIFICANT CHANGE UP (ref 3.5–5.3)
RBC # BLD: 2.82 M/UL — LOW (ref 4.2–5.8)
RBC # FLD: 18.4 % — HIGH (ref 10.3–14.5)
SODIUM SERPL-SCNC: 135 MMOL/L — SIGNIFICANT CHANGE UP (ref 135–145)
WBC # BLD: 10.32 K/UL — SIGNIFICANT CHANGE UP (ref 3.8–10.5)
WBC # FLD AUTO: 10.32 K/UL — SIGNIFICANT CHANGE UP (ref 3.8–10.5)

## 2019-11-29 PROCEDURE — 99231 SBSQ HOSP IP/OBS SF/LOW 25: CPT

## 2019-11-29 PROCEDURE — 99233 SBSQ HOSP IP/OBS HIGH 50: CPT

## 2019-11-29 PROCEDURE — 93010 ELECTROCARDIOGRAM REPORT: CPT

## 2019-11-29 RX ORDER — SODIUM CHLORIDE 9 MG/ML
1000 INJECTION, SOLUTION INTRAVENOUS
Refills: 0 | Status: DISCONTINUED | OUTPATIENT
Start: 2019-11-29 | End: 2019-11-29

## 2019-11-29 RX ADMIN — Medication 400 MILLIGRAM(S): at 00:12

## 2019-11-29 RX ADMIN — ATORVASTATIN CALCIUM 20 MILLIGRAM(S): 80 TABLET, FILM COATED ORAL at 21:54

## 2019-11-29 RX ADMIN — Medication 30 MILLIGRAM(S): at 05:10

## 2019-11-29 RX ADMIN — Medication 5 MILLIGRAM(S): at 18:14

## 2019-11-29 RX ADMIN — FINASTERIDE 5 MILLIGRAM(S): 5 TABLET, FILM COATED ORAL at 14:13

## 2019-11-29 RX ADMIN — Medication 400 MILLIGRAM(S): at 05:10

## 2019-11-29 RX ADMIN — SODIUM CHLORIDE 100 MILLILITER(S): 9 INJECTION, SOLUTION INTRAVENOUS at 07:59

## 2019-11-29 RX ADMIN — PREGABALIN 1000 MICROGRAM(S): 225 CAPSULE ORAL at 14:12

## 2019-11-29 RX ADMIN — SENNA PLUS 2 TABLET(S): 8.6 TABLET ORAL at 21:54

## 2019-11-29 RX ADMIN — ALBUTEROL 2 PUFF(S): 90 AEROSOL, METERED ORAL at 21:56

## 2019-11-29 RX ADMIN — Medication 5 MILLIGRAM(S): at 05:10

## 2019-11-29 RX ADMIN — HEPARIN SODIUM 5000 UNIT(S): 5000 INJECTION INTRAVENOUS; SUBCUTANEOUS at 05:10

## 2019-11-29 RX ADMIN — TAMSULOSIN HYDROCHLORIDE 0.4 MILLIGRAM(S): 0.4 CAPSULE ORAL at 21:54

## 2019-11-29 RX ADMIN — HEPARIN SODIUM 5000 UNIT(S): 5000 INJECTION INTRAVENOUS; SUBCUTANEOUS at 14:13

## 2019-11-29 RX ADMIN — SODIUM CHLORIDE 100 MILLILITER(S): 9 INJECTION, SOLUTION INTRAVENOUS at 09:22

## 2019-11-29 RX ADMIN — Medication 1 MILLIGRAM(S): at 14:12

## 2019-11-29 RX ADMIN — Medication 325 MILLIGRAM(S): at 14:13

## 2019-11-29 RX ADMIN — SODIUM CHLORIDE 3 MILLILITER(S): 9 INJECTION INTRAMUSCULAR; INTRAVENOUS; SUBCUTANEOUS at 05:06

## 2019-11-29 RX ADMIN — ALBUTEROL 2 PUFF(S): 90 AEROSOL, METERED ORAL at 09:38

## 2019-11-29 RX ADMIN — ALBUTEROL 2 PUFF(S): 90 AEROSOL, METERED ORAL at 18:14

## 2019-11-29 RX ADMIN — ALBUTEROL 2 PUFF(S): 90 AEROSOL, METERED ORAL at 04:41

## 2019-11-29 RX ADMIN — HEPARIN SODIUM 5000 UNIT(S): 5000 INJECTION INTRAVENOUS; SUBCUTANEOUS at 21:55

## 2019-11-29 RX ADMIN — SODIUM CHLORIDE 3 MILLILITER(S): 9 INJECTION INTRAMUSCULAR; INTRAVENOUS; SUBCUTANEOUS at 13:53

## 2019-11-29 RX ADMIN — SODIUM CHLORIDE 3 MILLILITER(S): 9 INJECTION INTRAMUSCULAR; INTRAVENOUS; SUBCUTANEOUS at 22:05

## 2019-11-29 RX ADMIN — Medication 1 TABLET(S): at 14:13

## 2019-11-29 RX ADMIN — ERYTHROPOIETIN 10000 UNIT(S): 10000 INJECTION, SOLUTION INTRAVENOUS; SUBCUTANEOUS at 09:38

## 2019-11-29 RX ADMIN — TIOTROPIUM BROMIDE 1 CAPSULE(S): 18 CAPSULE ORAL; RESPIRATORY (INHALATION) at 09:38

## 2019-11-29 NOTE — DISCHARGE NOTE NURSING/CASE MANAGEMENT/SOCIAL WORK - NSDCPNINST_GEN_ALL_CORE
Call MD for c/o no void, or bloody urine to leg bag or beach bag, fever, pain not relieved after medicated for pain and a return appointment.  Continue beach care and leg bag as instructed, keep bag below waist.  Change old ALLISON drainage site with gauze daily as instructed. Take over the counter stool softener to prevent constipation that can be a side effect from taking pain medication.

## 2019-11-29 NOTE — PROGRESS NOTE ADULT - SUBJECTIVE AND OBJECTIVE BOX
[facility] Division of Hospital Medicine  Andreia Bello MD  Pager (M-F, 8A-5P):  In-house pager [phone]; Long-range pager [phone]  Other Times:  Please page Hospitalist in Charge -  In-house pager [phone]    Patient is a 89y old  Male who presents with a chief complaint of Hematuria (29 Nov 2019 07:28)    SUBJECTIVE / OVERNIGHT EVENTS: Required irrigation over night, approx 200cc clot.  Wife at bedside.  Patient denies any discomfort now.  No SOB, nausea, vomiting.  ADDITIONAL REVIEW OF SYSTEMS:    MEDICATIONS  (STANDING):  ALBUTerol    90 MICROgram(s) HFA Inhaler 2 Puff(s) Inhalation every 6 hours  atorvastatin 20 milliGRAM(s) Oral at bedtime  calcium carbonate   1250 mG (OsCal) 1 Tablet(s) Oral daily  cyanocobalamin 1000 MICROGram(s) Oral daily  epoetin gianfranco Injectable 24178 Unit(s) SubCutaneous <User Schedule>  ferrous    sulfate 325 milliGRAM(s) Oral daily  finasteride 5 milliGRAM(s) Oral daily  folic acid 1 milliGRAM(s) Oral daily  heparin  Injectable 5000 Unit(s) SubCutaneous every 8 hours  influenza   Vaccine 0.5 milliLiter(s) IntraMuscular once  NIFEdipine XL 30 milliGRAM(s) Oral daily  oxybutynin 5 milliGRAM(s) Oral two times a day  senna 2 Tablet(s) Oral at bedtime  sodium chloride 0.9% lock flush 3 milliLiter(s) IV Push every 8 hours  tamsulosin 0.4 milliGRAM(s) Oral at bedtime  tiotropium 18 MICROgram(s) Capsule 1 Capsule(s) Inhalation daily    MEDICATIONS  (PRN):  belladonna 16.2 mG/opium 30 mg Suppository 1 Suppository(s) Rectal every 6 hours PRN severe pain  oxyCODONE    IR 5 milliGRAM(s) Oral every 4 hours PRN Severe Pain (7 - 10)    PHYSICAL EXAM:  Vital Signs Last 24 Hrs  T(C): 36.3 (29 Nov 2019 09:20), Max: 36.7 (29 Nov 2019 05:05)  T(F): 97.3 (29 Nov 2019 09:20), Max: 98 (29 Nov 2019 05:05)  HR: 88 (29 Nov 2019 09:32) (61 - 97)  BP: 130/47 (29 Nov 2019 09:32) (99/46 - 130/47)  BP(mean): --  RR: 16 (29 Nov 2019 09:32) (16 - 17)  SpO2: 97% (29 Nov 2019 09:32) (96% - 98%)    PHYSICAL EXAM:  GENERAL: elderly WM, lying flat  in bed, NAD  HEAD:  Atraumatic, Normocephalic  EYES: EOMI, PERRLA, conjunctiva and sclera clear  ENMT: Moist mucous membranes  NECK: Supple, No JVD  RESPIRATORY: Clear to auscultation bilaterally; No rales, rhonchi, wheezing, or rubs  CARDIOVASCULAR: Regular rate and rhythm; 3/6 systolic murmur  GASTROINTESTINAL: Soft, Nontender, Nondistended; Bowel sounds present  GENITOURINARY: beach/CBI, urine light pink  EXTREMITIES:  2+ Peripheral Pulses, No clubbing, cyanosis, or edema  NERVOUS SYSTEM: dysarthria, mild expressive aphasia, R sided weakness  HEME/LYMPH: No lymphadenopathy noted  SKIN: No rashes or lesions    LABS:                        8.3    10.32 )-----------( 236      ( 29 Nov 2019 06:15 )             27.4     11-29    135  |  106  |  30<H>  ----------------------------<  113<H>  4.3   |  19<L>  |  1.51<H>    Ca    7.8<L>      29 Nov 2019 06:15  Phos  3.7     11-29  Mg     2.0     11-29      PT/INR - ( 28 Nov 2019 14:44 )   PT: 11.5 SEC;   INR: 1.03          PTT - ( 28 Nov 2019 14:44 )  PTT:27.8 SEC            RADIOLOGY & ADDITIONAL TESTS:  Results Reviewed:   Imaging Personally Reviewed:  Electrocardiogram Personally Reviewed:    COORDINATION OF CARE:  Care Discussed with Consultants/Other Providers [Y/N]: urology re overall care  Prior or Outpatient Records Reviewed [Y/N]:

## 2019-11-29 NOTE — PROGRESS NOTE ADULT - ASSESSMENT
89 yo M admitted with gross hematuria likely 2/2 radiation cystitis, less likely due to hx low grade noninvasiva bladder cancer (resected) transferred from OSH; was there 3 weeks;  had hyperbaric O2 treatment; went to OR; alum x2 CBI 11/26-11/27. 11/28 - NS CBI.    Plan:  -f/u AM labs  -OOB/PT rec rehab  -NPO, IVF ? OR later today depending on color/HCT  -Cont CBI, check color  -F/u med, wound care  -Dispo: IDA

## 2019-11-29 NOTE — PROGRESS NOTE ADULT - SUBJECTIVE AND OBJECTIVE BOX
Overnight events:  Pt required 6 eye irrigation overnight with removal of approx 200cc clot    Subjective:  Pt offers no complaints, feels ok this am    Objective:    Vital signs  T(C): , Max: 36.7 (11-29-19 @ 05:05)  HR: 88 (11-29-19 @ 05:05)  BP: 116/50 (11-29-19 @ 05:05)  SpO2: 97% (11-29-19 @ 05:05)  Wt(kg): --    Output   CBI translucent light red    Gen: NAD  Abd: soft, nontender, no suprapubic tenderness or fullness  : yong secured    Labs: pending this am

## 2019-11-29 NOTE — PROGRESS NOTE ADULT - ASSESSMENT
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  Has been at [facility] since 10/31 with hematuria received 15 units PRBCs, gone to OR and had 15 hyperbaric oxygen treatments.  Per oncology pt has established diagnosis of low grade noninvasive bladder cancer from [facility] 10/18/19 with Dr. García Best.  Transferred for persistent hematuria, s/p trial of Alum x2, back on CBI

## 2019-11-29 NOTE — DISCHARGE NOTE NURSING/CASE MANAGEMENT/SOCIAL WORK - PATIENT PORTAL LINK FT
You can access the FollowMyHealth Patient Portal offered by [facility] by registering at the following website: [email or web address]. By joining [facility]’s FollowMyHealth portal, you will also be able to view your health information using other applications (apps) compatible with our system.

## 2019-11-29 NOTE — DISCHARGE NOTE NURSING/CASE MANAGEMENT/SOCIAL WORK - NSDCPEPTSTRK_GEN_ALL_CORE
Signs and symptoms of stroke/Prescribed medications/Risk factors for stroke/Stroke warning signs and symptoms/Need for follow up after discharge/Stroke education booklet/Call 911 for stroke/Stroke support groups for patients, families, and friends

## 2019-11-29 NOTE — PROGRESS NOTE ADULT - PROBLEM SELECTOR PLAN 2
management per urology - s/p Alumx2, back on CBI  - on flomax/Proscar.  Low grade noninvasive bladder CA diagnosed recently

## 2019-11-30 LAB
ANION GAP SERPL CALC-SCNC: 10 MMO/L — SIGNIFICANT CHANGE UP (ref 7–14)
BUN SERPL-MCNC: 29 MG/DL — HIGH (ref 7–23)
CALCIUM SERPL-MCNC: 7.6 MG/DL — LOW (ref 8.4–10.5)
CHLORIDE SERPL-SCNC: 104 MMOL/L — SIGNIFICANT CHANGE UP (ref 98–107)
CO2 SERPL-SCNC: 19 MMOL/L — LOW (ref 22–31)
CREAT SERPL-MCNC: 1.51 MG/DL — HIGH (ref 0.5–1.3)
GLUCOSE SERPL-MCNC: 125 MG/DL — HIGH (ref 70–99)
HCT VFR BLD CALC: 21.5 % — LOW (ref 39–50)
HCT VFR BLD CALC: 21.9 % — LOW (ref 39–50)
HCT VFR BLD CALC: 30.2 % — LOW (ref 39–50)
HGB BLD-MCNC: 10 G/DL — LOW (ref 13–17)
HGB BLD-MCNC: 6.5 G/DL — CRITICAL LOW (ref 13–17)
HGB BLD-MCNC: 6.8 G/DL — CRITICAL LOW (ref 13–17)
MAGNESIUM SERPL-MCNC: 2.1 MG/DL — SIGNIFICANT CHANGE UP (ref 1.6–2.6)
MCHC RBC-ENTMCNC: 28.1 PG — SIGNIFICANT CHANGE UP (ref 27–34)
MCHC RBC-ENTMCNC: 28.9 PG — SIGNIFICANT CHANGE UP (ref 27–34)
MCHC RBC-ENTMCNC: 29.7 PG — SIGNIFICANT CHANGE UP (ref 27–34)
MCHC RBC-ENTMCNC: 30.2 % — LOW (ref 32–36)
MCHC RBC-ENTMCNC: 31.1 % — LOW (ref 32–36)
MCHC RBC-ENTMCNC: 33.1 % — SIGNIFICANT CHANGE UP (ref 32–36)
MCV RBC AUTO: 89.6 FL — SIGNIFICANT CHANGE UP (ref 80–100)
MCV RBC AUTO: 93.1 FL — SIGNIFICANT CHANGE UP (ref 80–100)
MCV RBC AUTO: 93.2 FL — SIGNIFICANT CHANGE UP (ref 80–100)
NRBC # FLD: 0 K/UL — SIGNIFICANT CHANGE UP (ref 0–0)
PLATELET # BLD AUTO: 294 K/UL — SIGNIFICANT CHANGE UP (ref 150–400)
PLATELET # BLD AUTO: 325 K/UL — SIGNIFICANT CHANGE UP (ref 150–400)
PLATELET # BLD AUTO: 346 K/UL — SIGNIFICANT CHANGE UP (ref 150–400)
PMV BLD: 8.8 FL — SIGNIFICANT CHANGE UP (ref 7–13)
PMV BLD: 9.1 FL — SIGNIFICANT CHANGE UP (ref 7–13)
PMV BLD: 9.6 FL — SIGNIFICANT CHANGE UP (ref 7–13)
POTASSIUM SERPL-MCNC: 4.1 MMOL/L — SIGNIFICANT CHANGE UP (ref 3.5–5.3)
POTASSIUM SERPL-SCNC: 4.1 MMOL/L — SIGNIFICANT CHANGE UP (ref 3.5–5.3)
RBC # BLD: 2.31 M/UL — LOW (ref 4.2–5.8)
RBC # BLD: 2.35 M/UL — LOW (ref 4.2–5.8)
RBC # BLD: 3.37 M/UL — LOW (ref 4.2–5.8)
RBC # FLD: 16.2 % — HIGH (ref 10.3–14.5)
RBC # FLD: 18.3 % — HIGH (ref 10.3–14.5)
RBC # FLD: 18.3 % — HIGH (ref 10.3–14.5)
REVIEW TO FOLLOW: YES — SIGNIFICANT CHANGE UP
SODIUM SERPL-SCNC: 133 MMOL/L — LOW (ref 135–145)
WBC # BLD: 10.24 K/UL — SIGNIFICANT CHANGE UP (ref 3.8–10.5)
WBC # BLD: 10.75 K/UL — HIGH (ref 3.8–10.5)
WBC # BLD: 11.83 K/UL — HIGH (ref 3.8–10.5)
WBC # FLD AUTO: 10.24 K/UL — SIGNIFICANT CHANGE UP (ref 3.8–10.5)
WBC # FLD AUTO: 10.75 K/UL — HIGH (ref 3.8–10.5)
WBC # FLD AUTO: 11.83 K/UL — HIGH (ref 3.8–10.5)

## 2019-11-30 PROCEDURE — 99233 SBSQ HOSP IP/OBS HIGH 50: CPT

## 2019-11-30 PROCEDURE — 99231 SBSQ HOSP IP/OBS SF/LOW 25: CPT

## 2019-11-30 RX ORDER — FUROSEMIDE 40 MG
20 TABLET ORAL ONCE
Refills: 0 | Status: COMPLETED | OUTPATIENT
Start: 2019-11-30 | End: 2019-11-30

## 2019-11-30 RX ADMIN — PREGABALIN 1000 MICROGRAM(S): 225 CAPSULE ORAL at 14:13

## 2019-11-30 RX ADMIN — FINASTERIDE 5 MILLIGRAM(S): 5 TABLET, FILM COATED ORAL at 14:13

## 2019-11-30 RX ADMIN — HEPARIN SODIUM 5000 UNIT(S): 5000 INJECTION INTRAVENOUS; SUBCUTANEOUS at 05:22

## 2019-11-30 RX ADMIN — SODIUM CHLORIDE 3 MILLILITER(S): 9 INJECTION INTRAMUSCULAR; INTRAVENOUS; SUBCUTANEOUS at 05:22

## 2019-11-30 RX ADMIN — SODIUM CHLORIDE 3 MILLILITER(S): 9 INJECTION INTRAMUSCULAR; INTRAVENOUS; SUBCUTANEOUS at 21:59

## 2019-11-30 RX ADMIN — ALBUTEROL 2 PUFF(S): 90 AEROSOL, METERED ORAL at 03:51

## 2019-11-30 RX ADMIN — TAMSULOSIN HYDROCHLORIDE 0.4 MILLIGRAM(S): 0.4 CAPSULE ORAL at 22:02

## 2019-11-30 RX ADMIN — Medication 1 MILLIGRAM(S): at 14:14

## 2019-11-30 RX ADMIN — Medication 5 MILLIGRAM(S): at 05:22

## 2019-11-30 RX ADMIN — ATORVASTATIN CALCIUM 20 MILLIGRAM(S): 80 TABLET, FILM COATED ORAL at 22:02

## 2019-11-30 RX ADMIN — Medication 325 MILLIGRAM(S): at 14:13

## 2019-11-30 RX ADMIN — ALBUTEROL 2 PUFF(S): 90 AEROSOL, METERED ORAL at 17:54

## 2019-11-30 RX ADMIN — Medication 5 MILLIGRAM(S): at 17:54

## 2019-11-30 RX ADMIN — OXYCODONE HYDROCHLORIDE 5 MILLIGRAM(S): 5 TABLET ORAL at 04:04

## 2019-11-30 RX ADMIN — OXYCODONE HYDROCHLORIDE 5 MILLIGRAM(S): 5 TABLET ORAL at 05:04

## 2019-11-30 RX ADMIN — ALBUTEROL 2 PUFF(S): 90 AEROSOL, METERED ORAL at 10:05

## 2019-11-30 RX ADMIN — Medication 1 TABLET(S): at 14:12

## 2019-11-30 RX ADMIN — Medication 30 MILLIGRAM(S): at 05:22

## 2019-11-30 RX ADMIN — Medication 20 MILLIGRAM(S): at 13:48

## 2019-11-30 RX ADMIN — HEPARIN SODIUM 5000 UNIT(S): 5000 INJECTION INTRAVENOUS; SUBCUTANEOUS at 22:02

## 2019-11-30 RX ADMIN — HEPARIN SODIUM 5000 UNIT(S): 5000 INJECTION INTRAVENOUS; SUBCUTANEOUS at 14:13

## 2019-11-30 RX ADMIN — TIOTROPIUM BROMIDE 1 CAPSULE(S): 18 CAPSULE ORAL; RESPIRATORY (INHALATION) at 10:05

## 2019-11-30 RX ADMIN — ALBUTEROL 2 PUFF(S): 90 AEROSOL, METERED ORAL at 22:02

## 2019-11-30 RX ADMIN — SODIUM CHLORIDE 3 MILLILITER(S): 9 INJECTION INTRAMUSCULAR; INTRAVENOUS; SUBCUTANEOUS at 14:14

## 2019-11-30 NOTE — PROGRESS NOTE ADULT - SUBJECTIVE AND OBJECTIVE BOX
[facility] Division of Hospital Medicine  Andreia Bello MD  Pager (M-F, 8A-5P):  In-house pager [phone]; Long-range pager [phone]  Other Times:  Please page Hospitalist in Charge -  In-house pager [phone]    Patient is a 89y old  Male who presents with a chief complaint of Hematuria (30 Nov 2019 06:59)      SUBJECTIVE / OVERNIGHT EVENTS:  Slept better last night.  Ate breakfast.  No discomfort now.  Weak in general, sat up in chair yesterday.  No chest pain, SOB, nausea.  ADDITIONAL REVIEW OF SYSTEMS:    MEDICATIONS  (STANDING):  ALBUTerol    90 MICROgram(s) HFA Inhaler 2 Puff(s) Inhalation every 6 hours  atorvastatin 20 milliGRAM(s) Oral at bedtime  calcium carbonate   1250 mG (OsCal) 1 Tablet(s) Oral daily  cyanocobalamin 1000 MICROGram(s) Oral daily  epoetin gianfranco Injectable 68689 Unit(s) SubCutaneous <User Schedule>  ferrous    sulfate 325 milliGRAM(s) Oral daily  finasteride 5 milliGRAM(s) Oral daily  folic acid 1 milliGRAM(s) Oral daily  heparin  Injectable 5000 Unit(s) SubCutaneous every 8 hours  influenza   Vaccine 0.5 milliLiter(s) IntraMuscular once  NIFEdipine XL 30 milliGRAM(s) Oral daily  oxybutynin 5 milliGRAM(s) Oral two times a day  senna 2 Tablet(s) Oral at bedtime  sodium chloride 0.9% lock flush 3 milliLiter(s) IV Push every 8 hours  tamsulosin 0.4 milliGRAM(s) Oral at bedtime  tiotropium 18 MICROgram(s) Capsule 1 Capsule(s) Inhalation daily    MEDICATIONS  (PRN):  belladonna 16.2 mG/opium 30 mg Suppository 1 Suppository(s) Rectal every 6 hours PRN severe pain  oxyCODONE    IR 5 milliGRAM(s) Oral every 4 hours PRN Severe Pain (7 - 10)      CAPILLARY BLOOD GLUCOSE        I&O's Summary      PHYSICAL EXAM:  Vital Signs Last 24 Hrs  T(C): 36.4 (30 Nov 2019 09:12), Max: 37 (29 Nov 2019 20:28)  T(F): 97.6 (30 Nov 2019 09:12), Max: 98.6 (29 Nov 2019 20:28)  HR: 96 (30 Nov 2019 09:12) (91 - 100)  BP: 108/48 (30 Nov 2019 09:12) (107/43 - 120/51)  BP(mean): --  RR: 18 (30 Nov 2019 09:12) (16 - 18)  SpO2: 99% (30 Nov 2019 09:12) (95% - 99%)    GENERAL: elderly WM, lying flat  in bed, NAD  HEAD:  Atraumatic, Normocephalic  EYES: EOMI, PERRLA, conjunctiva and sclera clear  ENMT: Moist mucous membranes  NECK: Supple, No JVD  RESPIRATORY: Clear to auscultation bilaterally; No rales, rhonchi, wheezing, or rubs  CARDIOVASCULAR: Regular rate and rhythm; 3/6 systolic murmur  GASTROINTESTINAL: Soft, Nontender, Nondistended; Bowel sounds present  GENITOURINARY: beach/CBI, urine light pink  EXTREMITIES:  2+ Peripheral Pulses, No clubbing, cyanosis, or edema  NERVOUS SYSTEM: dysarthria, mild expressive aphasia, R sided weakness  HEME/LYMPH: No lymphadenopathy noted  SKIN: No rashes or lesions    LABS:                        6.8    10.75 )-----------( 346      ( 30 Nov 2019 07:22 )             21.9     11-30    133<L>  |  104  |  29<H>  ----------------------------<  125<H>  4.1   |  19<L>  |  1.51<H>    Ca    7.6<L>      30 Nov 2019 06:16  Phos  3.7     11-29  Mg     2.1     11-30      PT/INR - ( 28 Nov 2019 14:44 )   PT: 11.5 SEC;   INR: 1.03          PTT - ( 28 Nov 2019 14:44 )  PTT:27.8 SEC            RADIOLOGY & ADDITIONAL TESTS:  Results Reviewed:   Imaging Personally Reviewed:  Electrocardiogram Personally Reviewed:    COORDINATION OF CARE:  Care Discussed with Consultants/Other Providers [Y/N]: urology re overall care   Prior or Outpatient Records Reviewed [Y/N]:

## 2019-11-30 NOTE — PROGRESS NOTE ADULT - ASSESSMENT
89 yo M admitted with gross hematuria likely 2/2 radiation cystitis, less likely due to hx low grade noninvasiva bladder cancer (resected) transferred from OSH; was there 3 weeks;  had hyperbaric O2 treatment; went to OR; alum x2 CBI 11/26-11/27. 11/28 - NS CBI.    Plan:  -f/u AM labs  -trend Hct, no plans for OR for formalin instillation at this time if Hct stable  -OOB/PT rec rehab  -Cont CBI, check color, transfuse PRN  -F/u med, wound care  -Dispo: IDA 89 yo M admitted with gross hematuria likely 2/2 radiation cystitis, less likely due to hx low grade noninvasiva bladder cancer (resected) transferred from OSH; was there 3 weeks;  had hyperbaric O2 treatment; went to OR; alum x2 CBI 11/26-11/27. 11/28 - NS CBI.    Plan:  -f/u AM labs  -trend Hct, no plans for OR for formalin instillation at this time if Hct stable  -OOB/PT rec rehab  -Cont CBI, check color, transfuse PRN  -F/u med, wound care, heme/onc  -Dispo: IDA

## 2019-11-30 NOTE — PROGRESS NOTE ADULT - SUBJECTIVE AND OBJECTIVE BOX
Subjective  No overnight events. Did not require irrigation overnight. Resting comfortably this AM.    Objective    Vital signs  T(F): , Max: 98.6 (11-29-19 @ 20:28)  HR: 91 (11-30-19 @ 05:11)  BP: 110/42 (11-30-19 @ 05:11)  SpO2: 99% (11-30-19 @ 05:11)  Wt(kg): --    Output       Gen: NAD  Abd: soft, NT, ND  : beach output clear pink on slow gtt    Labs      11-30 @ 06:16    WBC --    / Hct --    / SCr 1.51     11-30 @ 06:00    WBC 10.24 / Hct 21.5  / SCr --         Imaging

## 2019-11-30 NOTE — PROGRESS NOTE ADULT - SUBJECTIVE AND OBJECTIVE BOX
Pt seen, family at bedside. Pt sleepy, receiving 2nd unit of PRBC today    MEDICATIONS  (STANDING):  ALBUTerol    90 MICROgram(s) HFA Inhaler 2 Puff(s) Inhalation every 6 hours  atorvastatin 20 milliGRAM(s) Oral at bedtime  calcium carbonate   1250 mG (OsCal) 1 Tablet(s) Oral daily  cyanocobalamin 1000 MICROGram(s) Oral daily  epoetin gianfranco Injectable 62113 Unit(s) SubCutaneous <User Schedule>  ferrous    sulfate 325 milliGRAM(s) Oral daily  finasteride 5 milliGRAM(s) Oral daily  folic acid 1 milliGRAM(s) Oral daily  heparin  Injectable 5000 Unit(s) SubCutaneous every 8 hours  influenza   Vaccine 0.5 milliLiter(s) IntraMuscular once  NIFEdipine XL 30 milliGRAM(s) Oral daily  oxybutynin 5 milliGRAM(s) Oral two times a day  senna 2 Tablet(s) Oral at bedtime  sodium chloride 0.9% lock flush 3 milliLiter(s) IV Push every 8 hours  tamsulosin 0.4 milliGRAM(s) Oral at bedtime  tiotropium 18 MICROgram(s) Capsule 1 Capsule(s) Inhalation daily    MEDICATIONS  (PRN):  belladonna 16.2 mG/opium 30 mg Suppository 1 Suppository(s) Rectal every 6 hours PRN severe pain  oxyCODONE    IR 5 milliGRAM(s) Oral every 4 hours PRN Severe Pain (7 - 10)      ROS  no pain, no CP, no SOB, + hematuria  limited 2/2 participation    Vital Signs Last 24 Hrs  T(C): 36.8 (30 Nov 2019 14:40), Max: 37 (29 Nov 2019 20:28)  T(F): 98.2 (30 Nov 2019 14:40), Max: 98.6 (29 Nov 2019 20:28)  HR: 93 (30 Nov 2019 14:40) (91 - 96)  BP: 114/85 (30 Nov 2019 14:40) (108/48 - 120/51)  BP(mean): --  RR: 18 (30 Nov 2019 14:40) (16 - 18)  SpO2: 98% (30 Nov 2019 14:40) (98% - 99%)    PE  NAD  Awake, alert  Anicteric, cachectic  RRR  CTAB ant chest, limited effort  Abd soft, NT, ND  No c/c/e  remainder of exam limited 2/2 participation  thin dark red urine in bag                          6.8    10.75 )-----------( 346      ( 30 Nov 2019 07:22 )             21.9       11-30    133<L>  |  104  |  29<H>  ----------------------------<  125<H>  4.1   |  19<L>  |  1.51<H>    Ca    7.6<L>      30 Nov 2019 06:16  Phos  3.7     11-29  Mg     2.1     11-30

## 2019-11-30 NOTE — PROGRESS NOTE ADULT - ASSESSMENT
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  Has been at [facility] since 10/31 with hematuria received 15 units PRBCs, gone to OR and had 15 hyperbaric oxygen treatments.  Per oncology pt has established diagnosis of low grade noninvasive bladder cancer from [facility] 10/18/19 with Dr. García Best.  Transferred for persistent hematuria, s/p trial of Alum x2, back on CBI

## 2019-11-30 NOTE — PROGRESS NOTE ADULT - ASSESSMENT
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  Has been at [facility] since 10/31 with hematuria received 15 units PRBCs, gone to OR and had 15 hyperbaric oxygen treatments.  Transferred here for further urologic management.  He was also noted to have low grade noninvasive UCC of bladder in 10/2018      -urology management appreciated for RT cystitis. Hgb acutely fell today  -f/u  eval    Anemia  -c/w oral iron  -c/w b12 and folate  -on procrit  -transfuse to keep Hgb > 8 given cardiac history, giving 2U PRBC today  -monitor CBC daily, in light of hematuria    bladder ca - outpt f/u    D/w pt briefly and with family at bedside, will follow, [phone]

## 2019-12-01 LAB
ANION GAP SERPL CALC-SCNC: 11 MMO/L — SIGNIFICANT CHANGE UP (ref 7–14)
BLD GP AB SCN SERPL QL: NEGATIVE — SIGNIFICANT CHANGE UP
BUN SERPL-MCNC: 29 MG/DL — HIGH (ref 7–23)
CALCIUM SERPL-MCNC: 7.8 MG/DL — LOW (ref 8.4–10.5)
CHLORIDE SERPL-SCNC: 106 MMOL/L — SIGNIFICANT CHANGE UP (ref 98–107)
CO2 SERPL-SCNC: 20 MMOL/L — LOW (ref 22–31)
CREAT SERPL-MCNC: 1.53 MG/DL — HIGH (ref 0.5–1.3)
GLUCOSE SERPL-MCNC: 111 MG/DL — HIGH (ref 70–99)
HCT VFR BLD CALC: 27 % — LOW (ref 39–50)
HCT VFR BLD CALC: 28.2 % — LOW (ref 39–50)
HGB BLD-MCNC: 8.8 G/DL — LOW (ref 13–17)
HGB BLD-MCNC: 8.9 G/DL — LOW (ref 13–17)
MCHC RBC-ENTMCNC: 28.9 PG — SIGNIFICANT CHANGE UP (ref 27–34)
MCHC RBC-ENTMCNC: 29.4 PG — SIGNIFICANT CHANGE UP (ref 27–34)
MCHC RBC-ENTMCNC: 31.6 % — LOW (ref 32–36)
MCHC RBC-ENTMCNC: 32.6 % — SIGNIFICANT CHANGE UP (ref 32–36)
MCV RBC AUTO: 90.3 FL — SIGNIFICANT CHANGE UP (ref 80–100)
MCV RBC AUTO: 91.6 FL — SIGNIFICANT CHANGE UP (ref 80–100)
NRBC # FLD: 0 K/UL — SIGNIFICANT CHANGE UP (ref 0–0)
NRBC # FLD: 0 K/UL — SIGNIFICANT CHANGE UP (ref 0–0)
PLATELET # BLD AUTO: 306 K/UL — SIGNIFICANT CHANGE UP (ref 150–400)
PLATELET # BLD AUTO: 357 K/UL — SIGNIFICANT CHANGE UP (ref 150–400)
PMV BLD: 8.5 FL — SIGNIFICANT CHANGE UP (ref 7–13)
PMV BLD: 8.9 FL — SIGNIFICANT CHANGE UP (ref 7–13)
POTASSIUM SERPL-MCNC: 4.1 MMOL/L — SIGNIFICANT CHANGE UP (ref 3.5–5.3)
POTASSIUM SERPL-SCNC: 4.1 MMOL/L — SIGNIFICANT CHANGE UP (ref 3.5–5.3)
RBC # BLD: 2.99 M/UL — LOW (ref 4.2–5.8)
RBC # BLD: 3.08 M/UL — LOW (ref 4.2–5.8)
RBC # FLD: 16.3 % — HIGH (ref 10.3–14.5)
RBC # FLD: 16.5 % — HIGH (ref 10.3–14.5)
RH IG SCN BLD-IMP: POSITIVE — SIGNIFICANT CHANGE UP
SODIUM SERPL-SCNC: 137 MMOL/L — SIGNIFICANT CHANGE UP (ref 135–145)
WBC # BLD: 10.78 K/UL — HIGH (ref 3.8–10.5)
WBC # BLD: 11.45 K/UL — HIGH (ref 3.8–10.5)
WBC # FLD AUTO: 10.78 K/UL — HIGH (ref 3.8–10.5)
WBC # FLD AUTO: 11.45 K/UL — HIGH (ref 3.8–10.5)

## 2019-12-01 PROCEDURE — 93010 ELECTROCARDIOGRAM REPORT: CPT

## 2019-12-01 PROCEDURE — 71045 X-RAY EXAM CHEST 1 VIEW: CPT | Mod: 26

## 2019-12-01 PROCEDURE — 99231 SBSQ HOSP IP/OBS SF/LOW 25: CPT

## 2019-12-01 PROCEDURE — 99233 SBSQ HOSP IP/OBS HIGH 50: CPT

## 2019-12-01 RX ADMIN — HEPARIN SODIUM 5000 UNIT(S): 5000 INJECTION INTRAVENOUS; SUBCUTANEOUS at 21:02

## 2019-12-01 RX ADMIN — FINASTERIDE 5 MILLIGRAM(S): 5 TABLET, FILM COATED ORAL at 12:39

## 2019-12-01 RX ADMIN — TIOTROPIUM BROMIDE 1 CAPSULE(S): 18 CAPSULE ORAL; RESPIRATORY (INHALATION) at 11:20

## 2019-12-01 RX ADMIN — Medication 5 MILLIGRAM(S): at 05:29

## 2019-12-01 RX ADMIN — Medication 1 MILLIGRAM(S): at 12:38

## 2019-12-01 RX ADMIN — TAMSULOSIN HYDROCHLORIDE 0.4 MILLIGRAM(S): 0.4 CAPSULE ORAL at 21:02

## 2019-12-01 RX ADMIN — Medication 1 TABLET(S): at 12:39

## 2019-12-01 RX ADMIN — ALBUTEROL 2 PUFF(S): 90 AEROSOL, METERED ORAL at 11:21

## 2019-12-01 RX ADMIN — Medication 325 MILLIGRAM(S): at 12:39

## 2019-12-01 RX ADMIN — ATORVASTATIN CALCIUM 20 MILLIGRAM(S): 80 TABLET, FILM COATED ORAL at 21:02

## 2019-12-01 RX ADMIN — ALBUTEROL 2 PUFF(S): 90 AEROSOL, METERED ORAL at 16:30

## 2019-12-01 RX ADMIN — HEPARIN SODIUM 5000 UNIT(S): 5000 INJECTION INTRAVENOUS; SUBCUTANEOUS at 14:34

## 2019-12-01 RX ADMIN — Medication 5 MILLIGRAM(S): at 17:12

## 2019-12-01 RX ADMIN — ATROPA BELLADONNA AND OPIUM 1 SUPPOSITORY(S): 16.2; 6 SUPPOSITORY RECTAL at 23:42

## 2019-12-01 RX ADMIN — Medication 30 MILLIGRAM(S): at 05:29

## 2019-12-01 RX ADMIN — SODIUM CHLORIDE 3 MILLILITER(S): 9 INJECTION INTRAMUSCULAR; INTRAVENOUS; SUBCUTANEOUS at 21:00

## 2019-12-01 RX ADMIN — PREGABALIN 1000 MICROGRAM(S): 225 CAPSULE ORAL at 12:38

## 2019-12-01 RX ADMIN — ALBUTEROL 2 PUFF(S): 90 AEROSOL, METERED ORAL at 21:02

## 2019-12-01 RX ADMIN — SODIUM CHLORIDE 3 MILLILITER(S): 9 INJECTION INTRAMUSCULAR; INTRAVENOUS; SUBCUTANEOUS at 14:34

## 2019-12-01 RX ADMIN — HEPARIN SODIUM 5000 UNIT(S): 5000 INJECTION INTRAVENOUS; SUBCUTANEOUS at 05:29

## 2019-12-01 RX ADMIN — SODIUM CHLORIDE 3 MILLILITER(S): 9 INJECTION INTRAMUSCULAR; INTRAVENOUS; SUBCUTANEOUS at 05:28

## 2019-12-01 NOTE — PROGRESS NOTE ADULT - SUBJECTIVE AND OBJECTIVE BOX
[facility] Division of Hospital Medicine  Andreia Bello MD  Pager (M-F, 8A-5P):  In-house pager [phone]; Long-range pager [phone]  Other Times:  Please page Hospitalist in Charge -  In-house pager [phone]    Patient is a 89y old  Male who presents with a chief complaint of Hematuria (01 Dec 2019 15:49)      SUBJECTIVE / OVERNIGHT EVENTS: Pt seen earlier with wife at bedside.  Frustrated still bleeding but pain from bladder significantly improved.  No chest pain, SOB.  ADDITIONAL REVIEW OF SYSTEMS:    MEDICATIONS  (STANDING):  ALBUTerol    90 MICROgram(s) HFA Inhaler 2 Puff(s) Inhalation every 6 hours  atorvastatin 20 milliGRAM(s) Oral at bedtime  calcium carbonate   1250 mG (OsCal) 1 Tablet(s) Oral daily  cyanocobalamin 1000 MICROGram(s) Oral daily  epoetin gianfranco Injectable 76940 Unit(s) SubCutaneous <User Schedule>  ferrous    sulfate 325 milliGRAM(s) Oral daily  finasteride 5 milliGRAM(s) Oral daily  folic acid 1 milliGRAM(s) Oral daily  heparin  Injectable 5000 Unit(s) SubCutaneous every 8 hours  influenza   Vaccine 0.5 milliLiter(s) IntraMuscular once  NIFEdipine XL 30 milliGRAM(s) Oral daily  oxybutynin 5 milliGRAM(s) Oral two times a day  senna 2 Tablet(s) Oral at bedtime  sodium chloride 0.9% lock flush 3 milliLiter(s) IV Push every 8 hours  tamsulosin 0.4 milliGRAM(s) Oral at bedtime  tiotropium 18 MICROgram(s) Capsule 1 Capsule(s) Inhalation daily    MEDICATIONS  (PRN):  belladonna 16.2 mG/opium 30 mg Suppository 1 Suppository(s) Rectal every 6 hours PRN severe pain  oxyCODONE    IR 5 milliGRAM(s) Oral every 4 hours PRN Severe Pain (7 - 10)      CAPILLARY BLOOD GLUCOSE        I&O's Summary      PHYSICAL EXAM:  Vital Signs Last 24 Hrs  T(C): 36.8 (01 Dec 2019 17:31), Max: 37.1 (30 Nov 2019 18:45)  T(F): 98.2 (01 Dec 2019 17:31), Max: 98.7 (30 Nov 2019 18:45)  HR: 98 (01 Dec 2019 17:31) (88 - 98)  BP: 125/53 (01 Dec 2019 17:31) (114/48 - 137/50)  BP(mean): --  RR: 16 (01 Dec 2019 17:31) (16 - 18)  SpO2: 100% (01 Dec 2019 17:31) (94% - 100%)    GENERAL: elderly WM, lying flat  in bed, NAD  HEAD:  Atraumatic, Normocephalic  EYES: EOMI, PERRLA, conjunctiva and sclera clear  ENMT: Moist mucous membranes  NECK: Supple, No JVD  RESPIRATORY: Clear to auscultation bilaterally; No rales, rhonchi, wheezing, or rubs  CARDIOVASCULAR: Regular rate and rhythm; 3/6 systolic murmur  GASTROINTESTINAL: Soft, Nontender, Nondistended; Bowel sounds present  GENITOURINARY: beach/CBI, urine light pink  EXTREMITIES:  2+ Peripheral Pulses, No clubbing, cyanosis, or edema  NERVOUS SYSTEM: dysarthria, mild expressive aphasia, R sided weakness  HEME/LYMPH: No lymphadenopathy noted  SKIN: No rashes or lesions    LABS:                        8.8    10.78 )-----------( 306      ( 01 Dec 2019 06:16 )             27.0     12-01    137  |  106  |  29<H>  ----------------------------<  111<H>  4.1   |  20<L>  |  1.53<H>    Ca    7.8<L>      01 Dec 2019 06:16  Mg     2.1     11-30                  RADIOLOGY & ADDITIONAL TESTS:  Results Reviewed:   Imaging Personally Reviewed:  Electrocardiogram Personally Reviewed:    COORDINATION OF CARE:  Care Discussed with Consultants/Other Providers [Y/N]: urology re overall care  Prior or Outpatient Records Reviewed [Y/N]:

## 2019-12-01 NOTE — PROGRESS NOTE ADULT - ASSESSMENT
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  Has been at [facility] since 10/31 with hematuria received 15 units PRBCs, gone to OR and had 15 hyperbaric oxygen treatments.  Per oncology pt has established diagnosis of low grade noninvasive bladder cancer from [facility] 10/18/19 with Dr. García Best.  Transferred for persistent hematuria, s/p trial of Alum x2, back on CBI

## 2019-12-01 NOTE — PROGRESS NOTE ADULT - ASSESSMENT
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  Has been at [facility] since 10/31 with hematuria received 15 units PRBCs, gone to OR and had 15 hyperbaric oxygen treatments.  Transferred here for further urologic management.  He was also noted to have low grade noninvasive UCC of bladder in 10/2018      -urology management appreciated for RT cystitis. Hgb acutely fell yesterday, improved after 2U PRBC  -f/u  eval, for possible formalin instillation  -would repeat CBC later this afternoon given bright red urine in bag    Anemia  -c/w oral iron  -c/w b12 and folate  -on procrit  -transfuse to keep Hgb > 8 given cardiac history, s/p 2U PRBC yest  -monitor CBC daily, in light of hematuria  -repeat CBC later this pm    bladder ca - outpt f/u    D/w pt briefly and with family at bedside, d/w  as well, will follow, [phone]

## 2019-12-01 NOTE — PROGRESS NOTE ADULT - ASSESSMENT
89 yo M admitted with gross hematuria likely 2/2 radiation cystitis, less likely due to hx low grade noninvasiva bladder cancer (resected) transferred from OSH; was there 3 weeks;  had hyperbaric O2 treatment; went to OR; alum x2 CBI 11/26-11/27. 11/28 - NS CBI.    Plan:  -F/u AM labs  -Trend Hct, consider OR for formalin instillation if Hct stable  -Transfuse PRN to maintain Hgb >8 given cardiac history  -OOB/PT rec rehab  -Cont CBI, check color  -F/u med, wound care, heme/onc  -Dispo: IDA, will leave with beach

## 2019-12-01 NOTE — PROGRESS NOTE ADULT - SUBJECTIVE AND OBJECTIVE BOX
Pt seen, family at bedside, feeling tired today still, CBI rate increased, bright red urine in bag    MEDICATIONS  (STANDING):  ALBUTerol    90 MICROgram(s) HFA Inhaler 2 Puff(s) Inhalation every 6 hours  atorvastatin 20 milliGRAM(s) Oral at bedtime  calcium carbonate   1250 mG (OsCal) 1 Tablet(s) Oral daily  cyanocobalamin 1000 MICROGram(s) Oral daily  epoetin gianfranco Injectable 69505 Unit(s) SubCutaneous <User Schedule>  ferrous    sulfate 325 milliGRAM(s) Oral daily  finasteride 5 milliGRAM(s) Oral daily  folic acid 1 milliGRAM(s) Oral daily  heparin  Injectable 5000 Unit(s) SubCutaneous every 8 hours  influenza   Vaccine 0.5 milliLiter(s) IntraMuscular once  NIFEdipine XL 30 milliGRAM(s) Oral daily  oxybutynin 5 milliGRAM(s) Oral two times a day  senna 2 Tablet(s) Oral at bedtime  sodium chloride 0.9% lock flush 3 milliLiter(s) IV Push every 8 hours  tamsulosin 0.4 milliGRAM(s) Oral at bedtime  tiotropium 18 MICROgram(s) Capsule 1 Capsule(s) Inhalation daily    MEDICATIONS  (PRN):  belladonna 16.2 mG/opium 30 mg Suppository 1 Suppository(s) Rectal every 6 hours PRN severe pain  oxyCODONE    IR 5 milliGRAM(s) Oral every 4 hours PRN Severe Pain (7 - 10)      ROS  limited 2/2 fatigue, as above, no SOB or CP    Vital Signs Last 24 Hrs  T(C): 37.1 (01 Dec 2019 14:33), Max: 37.1 (30 Nov 2019 18:45)  T(F): 98.7 (01 Dec 2019 14:33), Max: 98.7 (30 Nov 2019 18:45)  HR: 88 (01 Dec 2019 14:33) (88 - 97)  BP: 124/47 (01 Dec 2019 14:33) (114/48 - 137/50)  BP(mean): --  RR: 17 (01 Dec 2019 14:33) (16 - 18)  SpO2: 98% (01 Dec 2019 14:33) (94% - 100%)    PE  NAD, thin  Awake, alert  Anicteric, MMM  RRR  CTAB  Abd soft, NT, ND  No c/c/e  No rash grossly  FROM                          8.8    10.78 )-----------( 306      ( 01 Dec 2019 06:16 )             27.0       12-01    137  |  106  |  29<H>  ----------------------------<  111<H>  4.1   |  20<L>  |  1.53<H>    Ca    7.8<L>      01 Dec 2019 06:16  Mg     2.1     11-30

## 2019-12-01 NOTE — PROGRESS NOTE ADULT - SUBJECTIVE AND OBJECTIVE BOX
Subjective  No acute issues overnight. Irrigated beach with minimal clot return, minimal clot in bladder confirmed on bedside ultrasound. CBI rate increased to fast gtt this AM.    Objective    Vital signs  T(F): , Max: 98.7 (11-30-19 @ 18:45)  HR: 97 (12-01-19 @ 05:27)  BP: 114/48 (12-01-19 @ 05:27)  SpO2: 94% (12-01-19 @ 05:27)  Wt(kg): --    Output       Gen: NAD  Abd: S, NT, ND  : CBI fast gtt, light punch    Labs      11-30 @ 21:00    WBC 11.83 / Hct 30.2  / SCr --       11-30 @ 07:22    WBC 10.75 / Hct 21.9  / SCr --

## 2019-12-01 NOTE — PROGRESS NOTE ADULT - PROBLEM SELECTOR PLAN 2
management per urology - s/p Alumx2, back on CBI  - on flomax/Proscar.  Low grade noninvasive bladder CA diagnosed recently  --> Urology plan for formalin instillation in OR.  Pt is medically optimized for this procedure, no further testing indicated.

## 2019-12-02 LAB
ANION GAP SERPL CALC-SCNC: 9 MMO/L — SIGNIFICANT CHANGE UP (ref 7–14)
BUN SERPL-MCNC: 29 MG/DL — HIGH (ref 7–23)
CALCIUM SERPL-MCNC: 7.9 MG/DL — LOW (ref 8.4–10.5)
CHLORIDE SERPL-SCNC: 105 MMOL/L — SIGNIFICANT CHANGE UP (ref 98–107)
CO2 SERPL-SCNC: 21 MMOL/L — LOW (ref 22–31)
CREAT SERPL-MCNC: 1.42 MG/DL — HIGH (ref 0.5–1.3)
GLUCOSE SERPL-MCNC: 114 MG/DL — HIGH (ref 70–99)
HCT VFR BLD CALC: 22.7 % — LOW (ref 39–50)
HGB BLD-MCNC: 7.2 G/DL — LOW (ref 13–17)
MCHC RBC-ENTMCNC: 29.8 PG — SIGNIFICANT CHANGE UP (ref 27–34)
MCHC RBC-ENTMCNC: 31.7 % — LOW (ref 32–36)
MCV RBC AUTO: 93.8 FL — SIGNIFICANT CHANGE UP (ref 80–100)
NRBC # FLD: 0 K/UL — SIGNIFICANT CHANGE UP (ref 0–0)
PLATELET # BLD AUTO: 311 K/UL — SIGNIFICANT CHANGE UP (ref 150–400)
PMV BLD: 8.9 FL — SIGNIFICANT CHANGE UP (ref 7–13)
POTASSIUM SERPL-MCNC: 3.7 MMOL/L — SIGNIFICANT CHANGE UP (ref 3.5–5.3)
POTASSIUM SERPL-SCNC: 3.7 MMOL/L — SIGNIFICANT CHANGE UP (ref 3.5–5.3)
RBC # BLD: 2.42 M/UL — LOW (ref 4.2–5.8)
RBC # FLD: 16.8 % — HIGH (ref 10.3–14.5)
SODIUM SERPL-SCNC: 135 MMOL/L — SIGNIFICANT CHANGE UP (ref 135–145)
WBC # BLD: 10.37 K/UL — SIGNIFICANT CHANGE UP (ref 3.8–10.5)
WBC # FLD AUTO: 10.37 K/UL — SIGNIFICANT CHANGE UP (ref 3.8–10.5)

## 2019-12-02 PROCEDURE — 99233 SBSQ HOSP IP/OBS HIGH 50: CPT

## 2019-12-02 PROCEDURE — 99231 SBSQ HOSP IP/OBS SF/LOW 25: CPT

## 2019-12-02 RX ORDER — SODIUM CHLORIDE 9 MG/ML
1000 INJECTION, SOLUTION INTRAVENOUS
Refills: 0 | Status: DISCONTINUED | OUTPATIENT
Start: 2019-12-02 | End: 2019-12-02

## 2019-12-02 RX ADMIN — ALBUTEROL 2 PUFF(S): 90 AEROSOL, METERED ORAL at 17:18

## 2019-12-02 RX ADMIN — HEPARIN SODIUM 5000 UNIT(S): 5000 INJECTION INTRAVENOUS; SUBCUTANEOUS at 22:34

## 2019-12-02 RX ADMIN — OXYCODONE HYDROCHLORIDE 5 MILLIGRAM(S): 5 TABLET ORAL at 14:42

## 2019-12-02 RX ADMIN — Medication 1 TABLET(S): at 11:05

## 2019-12-02 RX ADMIN — TIOTROPIUM BROMIDE 1 CAPSULE(S): 18 CAPSULE ORAL; RESPIRATORY (INHALATION) at 11:02

## 2019-12-02 RX ADMIN — OXYCODONE HYDROCHLORIDE 5 MILLIGRAM(S): 5 TABLET ORAL at 22:34

## 2019-12-02 RX ADMIN — OXYCODONE HYDROCHLORIDE 5 MILLIGRAM(S): 5 TABLET ORAL at 23:19

## 2019-12-02 RX ADMIN — SENNA PLUS 2 TABLET(S): 8.6 TABLET ORAL at 22:34

## 2019-12-02 RX ADMIN — ERYTHROPOIETIN 10000 UNIT(S): 10000 INJECTION, SOLUTION INTRAVENOUS; SUBCUTANEOUS at 11:01

## 2019-12-02 RX ADMIN — SODIUM CHLORIDE 3 MILLILITER(S): 9 INJECTION INTRAMUSCULAR; INTRAVENOUS; SUBCUTANEOUS at 22:34

## 2019-12-02 RX ADMIN — SODIUM CHLORIDE 3 MILLILITER(S): 9 INJECTION INTRAMUSCULAR; INTRAVENOUS; SUBCUTANEOUS at 05:29

## 2019-12-02 RX ADMIN — Medication 5 MILLIGRAM(S): at 05:30

## 2019-12-02 RX ADMIN — Medication 5 MILLIGRAM(S): at 17:18

## 2019-12-02 RX ADMIN — SODIUM CHLORIDE 50 MILLILITER(S): 9 INJECTION, SOLUTION INTRAVENOUS at 06:43

## 2019-12-02 RX ADMIN — OXYCODONE HYDROCHLORIDE 5 MILLIGRAM(S): 5 TABLET ORAL at 15:44

## 2019-12-02 RX ADMIN — PREGABALIN 1000 MICROGRAM(S): 225 CAPSULE ORAL at 11:05

## 2019-12-02 RX ADMIN — FINASTERIDE 5 MILLIGRAM(S): 5 TABLET, FILM COATED ORAL at 11:04

## 2019-12-02 RX ADMIN — Medication 1 MILLIGRAM(S): at 11:04

## 2019-12-02 RX ADMIN — SODIUM CHLORIDE 3 MILLILITER(S): 9 INJECTION INTRAMUSCULAR; INTRAVENOUS; SUBCUTANEOUS at 14:04

## 2019-12-02 RX ADMIN — ATORVASTATIN CALCIUM 20 MILLIGRAM(S): 80 TABLET, FILM COATED ORAL at 22:34

## 2019-12-02 RX ADMIN — Medication 30 MILLIGRAM(S): at 11:05

## 2019-12-02 RX ADMIN — HEPARIN SODIUM 5000 UNIT(S): 5000 INJECTION INTRAVENOUS; SUBCUTANEOUS at 14:35

## 2019-12-02 RX ADMIN — Medication 325 MILLIGRAM(S): at 11:04

## 2019-12-02 RX ADMIN — HEPARIN SODIUM 5000 UNIT(S): 5000 INJECTION INTRAVENOUS; SUBCUTANEOUS at 05:30

## 2019-12-02 RX ADMIN — ATROPA BELLADONNA AND OPIUM 1 SUPPOSITORY(S): 16.2; 6 SUPPOSITORY RECTAL at 00:40

## 2019-12-02 RX ADMIN — ALBUTEROL 2 PUFF(S): 90 AEROSOL, METERED ORAL at 22:33

## 2019-12-02 RX ADMIN — ALBUTEROL 2 PUFF(S): 90 AEROSOL, METERED ORAL at 11:02

## 2019-12-02 RX ADMIN — TAMSULOSIN HYDROCHLORIDE 0.4 MILLIGRAM(S): 0.4 CAPSULE ORAL at 22:34

## 2019-12-02 NOTE — PROGRESS NOTE ADULT - SUBJECTIVE AND OBJECTIVE BOX
Afeb 100/70 97 96%RA    Pt has no new c/o  Abd- soft NT ND   Lal/CBI pink Repair Performed By Another Provider Text (Leave Blank If You Do Not Want): After obtaining clear surgical margins the defect was repaired by another provider.

## 2019-12-02 NOTE — PROGRESS NOTE ADULT - SUBJECTIVE AND OBJECTIVE BOX
[facility] Division of Hospital Medicine  Andreia Bello MD  Pager (M-F, 8A-5P):  In-house pager [phone]; Long-range pager [phone]  Other Times:  Please page Hospitalist in Charge -  In-house pager [phone]    Patient is a 89y old  Male who presents with a chief complaint of Hematuria (02 Dec 2019 07:28)      SUBJECTIVE / OVERNIGHT EVENTS: No problems reported over night. Still continues to have bleeding.  Planning for OR.  Pt frustrated.  Denies chest pain, SOB, nausea.  ADDITIONAL REVIEW OF SYSTEMS:    MEDICATIONS  (STANDING):  ALBUTerol    90 MICROgram(s) HFA Inhaler 2 Puff(s) Inhalation every 6 hours  atorvastatin 20 milliGRAM(s) Oral at bedtime  calcium carbonate   1250 mG (OsCal) 1 Tablet(s) Oral daily  cyanocobalamin 1000 MICROGram(s) Oral daily  epoetin gianfranco Injectable 39871 Unit(s) SubCutaneous <User Schedule>  ferrous    sulfate 325 milliGRAM(s) Oral daily  finasteride 5 milliGRAM(s) Oral daily  folic acid 1 milliGRAM(s) Oral daily  heparin  Injectable 5000 Unit(s) SubCutaneous every 8 hours  influenza   Vaccine 0.5 milliLiter(s) IntraMuscular once  NIFEdipine XL 30 milliGRAM(s) Oral daily  oxybutynin 5 milliGRAM(s) Oral two times a day  senna 2 Tablet(s) Oral at bedtime  sodium chloride 0.9% lock flush 3 milliLiter(s) IV Push every 8 hours  tamsulosin 0.4 milliGRAM(s) Oral at bedtime  tiotropium 18 MICROgram(s) Capsule 1 Capsule(s) Inhalation daily    MEDICATIONS  (PRN):  belladonna 16.2 mG/opium 30 mg Suppository 1 Suppository(s) Rectal every 6 hours PRN severe pain  oxyCODONE    IR 5 milliGRAM(s) Oral every 4 hours PRN Severe Pain (7 - 10)    PHYSICAL EXAM:  Vital Signs Last 24 Hrs  T(C): 36.3 (02 Dec 2019 09:23), Max: 37.1 (01 Dec 2019 14:33)  T(F): 97.4 (02 Dec 2019 09:23), Max: 98.7 (01 Dec 2019 14:33)  HR: 93 (02 Dec 2019 09:23) (88 - 98)  BP: 117/62 (02 Dec 2019 09:23) (100/71 - 134/58)  BP(mean): --  RR: 16 (02 Dec 2019 09:23) (16 - 17)  SpO2: 100% (02 Dec 2019 09:23) (96% - 100%)    GENERAL: elderly WM, lying flat  in bed, NAD  HEAD:  Atraumatic, Normocephalic  EYES: EOMI, PERRLA, conjunctiva and sclera clear  ENMT: Moist mucous membranes  NECK: Supple, No JVD  RESPIRATORY: Clear to auscultation bilaterally; No rales, rhonchi, wheezing, or rubs  CARDIOVASCULAR: Regular rate and rhythm; 3/6 systolic murmur  GASTROINTESTINAL: Soft, Nontender, Nondistended; Bowel sounds present  GENITOURINARY: beach/CBI, urine light pink  EXTREMITIES:  2+ Peripheral Pulses, No clubbing, cyanosis, or edema  NERVOUS SYSTEM: dysarthria, mild expressive aphasia, R sided weakness  HEME/LYMPH: No lymphadenopathy noted  SKIN: No rashes or lesions    LABS:                        7.2    10.37 )-----------( 311      ( 02 Dec 2019 06:06 )             22.7     12-02    135  |  105  |  29<H>  ----------------------------<  114<H>  3.7   |  21<L>  |  1.42<H>    Ca    7.9<L>      02 Dec 2019 06:06    RADIOLOGY & ADDITIONAL TESTS:  Results Reviewed:   Imaging Personally Reviewed:  Electrocardiogram Personally Reviewed:    COORDINATION OF CARE:  Care Discussed with Consultants/Other Providers [Y/N]: urology re overall care  Prior or Outpatient Records Reviewed [Y/N]:

## 2019-12-02 NOTE — PROGRESS NOTE ADULT - ASSESSMENT
89 yo M admitted with gross hematuria likely 2/2 radiation cystitis, less likely due to hx low grade noninvasive bladder cancer (resected) transferred from OSH; was there 3 weeks;  had hyperbaric O2 treatment; went to OR; alum x2 CBI 11/26-11/27. 11/28 - NS CBI.; still bleeding, going to OR today (12/2)    Plan:  -F/u AM labs  -OR today with formalin  - cont. CBI

## 2019-12-02 NOTE — PROGRESS NOTE ADULT - ASSESSMENT
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  Has been at [facility] since 10/31 with hematuria received 15 units PRBCs, gone to OR and had 15 hyperbaric oxygen treatments.  Per oncology pt has established diagnosis of low grade noninvasive bladder cancer from [facility] 10/18/19 with Dr. García Best.  Transferred for persistent hematuria, s/p trial of Alum x2, back on CBI

## 2019-12-03 LAB
ANION GAP SERPL CALC-SCNC: 9 MMO/L — SIGNIFICANT CHANGE UP (ref 7–14)
BLD GP AB SCN SERPL QL: NEGATIVE — SIGNIFICANT CHANGE UP
BUN SERPL-MCNC: 28 MG/DL — HIGH (ref 7–23)
CALCIUM SERPL-MCNC: 7.8 MG/DL — LOW (ref 8.4–10.5)
CHLORIDE SERPL-SCNC: 105 MMOL/L — SIGNIFICANT CHANGE UP (ref 98–107)
CO2 SERPL-SCNC: 22 MMOL/L — SIGNIFICANT CHANGE UP (ref 22–31)
CREAT SERPL-MCNC: 1.41 MG/DL — HIGH (ref 0.5–1.3)
GLUCOSE SERPL-MCNC: 105 MG/DL — HIGH (ref 70–99)
HCT VFR BLD CALC: 28.5 % — LOW (ref 39–50)
HGB BLD-MCNC: 9.1 G/DL — LOW (ref 13–17)
MCHC RBC-ENTMCNC: 29.2 PG — SIGNIFICANT CHANGE UP (ref 27–34)
MCHC RBC-ENTMCNC: 31.9 % — LOW (ref 32–36)
MCV RBC AUTO: 91.3 FL — SIGNIFICANT CHANGE UP (ref 80–100)
NRBC # FLD: 0 K/UL — SIGNIFICANT CHANGE UP (ref 0–0)
PLATELET # BLD AUTO: 268 K/UL — SIGNIFICANT CHANGE UP (ref 150–400)
PMV BLD: 8.9 FL — SIGNIFICANT CHANGE UP (ref 7–13)
POTASSIUM SERPL-MCNC: 3.7 MMOL/L — SIGNIFICANT CHANGE UP (ref 3.5–5.3)
POTASSIUM SERPL-SCNC: 3.7 MMOL/L — SIGNIFICANT CHANGE UP (ref 3.5–5.3)
RBC # BLD: 3.12 M/UL — LOW (ref 4.2–5.8)
RBC # FLD: 17.2 % — HIGH (ref 10.3–14.5)
RH IG SCN BLD-IMP: POSITIVE — SIGNIFICANT CHANGE UP
SODIUM SERPL-SCNC: 136 MMOL/L — SIGNIFICANT CHANGE UP (ref 135–145)
WBC # BLD: 10.21 K/UL — SIGNIFICANT CHANGE UP (ref 3.8–10.5)
WBC # FLD AUTO: 10.21 K/UL — SIGNIFICANT CHANGE UP (ref 3.8–10.5)

## 2019-12-03 PROCEDURE — 99233 SBSQ HOSP IP/OBS HIGH 50: CPT

## 2019-12-03 PROCEDURE — 99231 SBSQ HOSP IP/OBS SF/LOW 25: CPT

## 2019-12-03 RX ORDER — ATROPA BELLADONNA AND OPIUM 16.2; 6 MG/1; MG/1
1 SUPPOSITORY RECTAL EVERY 6 HOURS
Refills: 0 | Status: DISCONTINUED | OUTPATIENT
Start: 2019-12-03 | End: 2019-12-05

## 2019-12-03 RX ORDER — SODIUM CHLORIDE 9 MG/ML
1000 INJECTION INTRAMUSCULAR; INTRAVENOUS; SUBCUTANEOUS
Refills: 0 | Status: DISCONTINUED | OUTPATIENT
Start: 2019-12-03 | End: 2019-12-03

## 2019-12-03 RX ORDER — DIAZEPAM 5 MG
2 TABLET ORAL ONCE
Refills: 0 | Status: DISCONTINUED | OUTPATIENT
Start: 2019-12-03 | End: 2019-12-03

## 2019-12-03 RX ADMIN — HEPARIN SODIUM 5000 UNIT(S): 5000 INJECTION INTRAVENOUS; SUBCUTANEOUS at 22:25

## 2019-12-03 RX ADMIN — HEPARIN SODIUM 5000 UNIT(S): 5000 INJECTION INTRAVENOUS; SUBCUTANEOUS at 15:28

## 2019-12-03 RX ADMIN — FINASTERIDE 5 MILLIGRAM(S): 5 TABLET, FILM COATED ORAL at 12:23

## 2019-12-03 RX ADMIN — ALBUTEROL 2 PUFF(S): 90 AEROSOL, METERED ORAL at 15:28

## 2019-12-03 RX ADMIN — ATROPA BELLADONNA AND OPIUM 1 SUPPOSITORY(S): 16.2; 6 SUPPOSITORY RECTAL at 07:00

## 2019-12-03 RX ADMIN — OXYCODONE HYDROCHLORIDE 5 MILLIGRAM(S): 5 TABLET ORAL at 18:29

## 2019-12-03 RX ADMIN — SENNA PLUS 2 TABLET(S): 8.6 TABLET ORAL at 22:25

## 2019-12-03 RX ADMIN — ATORVASTATIN CALCIUM 20 MILLIGRAM(S): 80 TABLET, FILM COATED ORAL at 22:25

## 2019-12-03 RX ADMIN — ALBUTEROL 2 PUFF(S): 90 AEROSOL, METERED ORAL at 05:31

## 2019-12-03 RX ADMIN — ALBUTEROL 2 PUFF(S): 90 AEROSOL, METERED ORAL at 22:26

## 2019-12-03 RX ADMIN — Medication 1 TABLET(S): at 12:23

## 2019-12-03 RX ADMIN — Medication 30 MILLIGRAM(S): at 05:31

## 2019-12-03 RX ADMIN — OXYCODONE HYDROCHLORIDE 5 MILLIGRAM(S): 5 TABLET ORAL at 06:27

## 2019-12-03 RX ADMIN — Medication 1 MILLIGRAM(S): at 12:23

## 2019-12-03 RX ADMIN — HEPARIN SODIUM 5000 UNIT(S): 5000 INJECTION INTRAVENOUS; SUBCUTANEOUS at 05:31

## 2019-12-03 RX ADMIN — Medication 5 MILLIGRAM(S): at 18:29

## 2019-12-03 RX ADMIN — SODIUM CHLORIDE 3 MILLILITER(S): 9 INJECTION INTRAMUSCULAR; INTRAVENOUS; SUBCUTANEOUS at 05:30

## 2019-12-03 RX ADMIN — ATROPA BELLADONNA AND OPIUM 1 SUPPOSITORY(S): 16.2; 6 SUPPOSITORY RECTAL at 07:45

## 2019-12-03 RX ADMIN — PREGABALIN 1000 MICROGRAM(S): 225 CAPSULE ORAL at 12:23

## 2019-12-03 RX ADMIN — OXYCODONE HYDROCHLORIDE 5 MILLIGRAM(S): 5 TABLET ORAL at 19:20

## 2019-12-03 RX ADMIN — SODIUM CHLORIDE 3 MILLILITER(S): 9 INJECTION INTRAMUSCULAR; INTRAVENOUS; SUBCUTANEOUS at 14:26

## 2019-12-03 RX ADMIN — TIOTROPIUM BROMIDE 1 CAPSULE(S): 18 CAPSULE ORAL; RESPIRATORY (INHALATION) at 09:23

## 2019-12-03 RX ADMIN — Medication 5 MILLIGRAM(S): at 05:31

## 2019-12-03 RX ADMIN — SODIUM CHLORIDE 3 MILLILITER(S): 9 INJECTION INTRAMUSCULAR; INTRAVENOUS; SUBCUTANEOUS at 22:25

## 2019-12-03 RX ADMIN — OXYCODONE HYDROCHLORIDE 5 MILLIGRAM(S): 5 TABLET ORAL at 05:42

## 2019-12-03 RX ADMIN — ALBUTEROL 2 PUFF(S): 90 AEROSOL, METERED ORAL at 09:23

## 2019-12-03 RX ADMIN — Medication 2 MILLIGRAM(S): at 09:23

## 2019-12-03 NOTE — PROGRESS NOTE ADULT - PROBLEM SELECTOR PLAN 1
from bleeding presumed from radiation cystitis, low grade noninvasive bladder CA diagnosed recently - trend H&H, aim to keep Hb>8 given cardiac history (s/p ?21 units PRBCs thus far...) f/u   - on iron, B12, fa  - epo tiw.

## 2019-12-03 NOTE — PRE-ANESTHESIA EVALUATION ADULT - NSANTHPMHFT_GEN_ALL_CORE
89yo male with WILLIAM, CVA, CAD and new III/VI systolic ejection murmur likely AS.  He also has had anemia which may be accentuating the murmur, but is also on Flomax an alpha blocking agent which means that optimizing coronary perfusion and diastolic BP will be challenging.  Discussed preop mary and vasopressin infusion versus echo to assess aortic valve.  In discussion with Dr. Carroll and family elected to hold off surgery till tomorrow and obtain echo early tomorrow AM.

## 2019-12-03 NOTE — PROGRESS NOTE ADULT - PROBLEM SELECTOR PLAN 2
bleeding presumed from radiation cystitis, low grade noninvasive bladder CA diagnosed recently  management per urology - s/p Alumx2, CBI  --> Urology plan for formalin instillation in OR.  Pt is medically optimized for this procedure, no further testing indicated.

## 2019-12-03 NOTE — PROGRESS NOTE ADULT - SUBJECTIVE AND OBJECTIVE BOX
continues to have pain from bladder spasm, hematuria    ALBUTerol    90 MICROgram(s) HFA Inhaler 2 Puff(s) Inhalation every 6 hours  atorvastatin 20 milliGRAM(s) Oral at bedtime  belladonna 16.2 mG/opium 30 mg Suppository 1 Suppository(s) Rectal every 6 hours PRN  calcium carbonate   1250 mG (OsCal) 1 Tablet(s) Oral daily  cyanocobalamin 1000 MICROGram(s) Oral daily  epoetin gianfranco Injectable 17338 Unit(s) SubCutaneous <User Schedule>  ferrous    sulfate 325 milliGRAM(s) Oral daily  finasteride 5 milliGRAM(s) Oral daily  folic acid 1 milliGRAM(s) Oral daily  Formalin 2% in Sterile Water 500 milliLiter(s) 500 milliLiter(s) Topical once  heparin  Injectable 5000 Unit(s) SubCutaneous every 8 hours  influenza   Vaccine 0.5 milliLiter(s) IntraMuscular once  NIFEdipine XL 30 milliGRAM(s) Oral daily  oxybutynin 5 milliGRAM(s) Oral two times a day  oxyCODONE    IR 5 milliGRAM(s) Oral every 4 hours PRN  senna 2 Tablet(s) Oral at bedtime  sodium chloride 0.9% lock flush 3 milliLiter(s) IV Push every 8 hours  sodium chloride 0.9%. 1000 milliLiter(s) IV Continuous <Continuous>  tiotropium 18 MICROgram(s) Capsule 1 Capsule(s) Inhalation daily      No Known Allergies      ROS otherwise negative     T(C): 36.7 (12-03-19 @ 17:56), Max: 37.5 (12-02-19 @ 21:22)  HR: 98 (12-03-19 @ 17:56) (80 - 98)  BP: 116/53 (12-03-19 @ 17:56) (116/49 - 134/54)  RR: 18 (12-03-19 @ 17:56) (17 - 20)  SpO2: 95% (12-03-19 @ 17:56) (94% - 97%)  PHYSICAL EXAM  Gen:  laying in bed, in pain, in midst of bladder irrigation  Ext:  no edema                          9.1    10.21 )-----------( 268      ( 03 Dec 2019 05:27 )             28.5                         7.2    10.37 )-----------( 311      ( 02 Dec 2019 06:06 )             22.7                         8.9    11.45 )-----------( 357      ( 01 Dec 2019 17:27 )             28.2   12-03    136  |  105  |  28<H>  ----------------------------<  105<H>  3.7   |  22  |  1.41<H>    Ca    7.8<L>      03 Dec 2019 05:23

## 2019-12-03 NOTE — PROGRESS NOTE ADULT - ASSESSMENT
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  Has been at [facility] since 10/31 with hematuria received 15 units PRBCs, gone to OR and had 15 hyperbaric oxygen treatments.  Transferred here for further urologic management.  He was also noted to have low grade noninvasive UCC of bladder in 10/2018      -urology management appreciated for RT cystitis. Hgb acutely fell yesterday, improved after 2U PRBC on 12/2  -formalin instillation postponed today for preoperative optimization    Anemia  -c/w oral iron  -c/w b12 and folate  -on procrit  -transfuse to keep Hgb > 8 given cardiac history, s/p 2U PRBC yest  -monitor CBC daily, in light of hematuria        bladder ca - outpt f/u    Daniel Perera MD  Hematology/Oncology  Cell:  [phone]  Office Phone: [phone]  Office Fax:  [phone] [address] [id number]

## 2019-12-03 NOTE — PROGRESS NOTE ADULT - SUBJECTIVE AND OBJECTIVE BOX
[facility] Division of Hospital Medicine  Andreia Bello MD  Pager (M-F, 8A-5P):  In-house pager [phone]; Long-range pager [phone]  Other Times:  Please page Hospitalist in Charge -  In-house pager [phone]    Patient is a 89y old  Male who presents with a chief complaint of Hematuria (03 Dec 2019 07:08)      SUBJECTIVE / OVERNIGHT EVENTS:  Daughter at bedside.  Pt seen earlier today, moaning in discomfort from bladder pain.  Team flushed some clots, catheter draining punch color urine, much more comfortable.    ADDITIONAL REVIEW OF SYSTEMS:    MEDICATIONS  (STANDING):  ALBUTerol    90 MICROgram(s) HFA Inhaler 2 Puff(s) Inhalation every 6 hours  atorvastatin 20 milliGRAM(s) Oral at bedtime  calcium carbonate   1250 mG (OsCal) 1 Tablet(s) Oral daily  cyanocobalamin 1000 MICROGram(s) Oral daily  epoetin gianfranco Injectable 04006 Unit(s) SubCutaneous <User Schedule>  ferrous    sulfate 325 milliGRAM(s) Oral daily  finasteride 5 milliGRAM(s) Oral daily  folic acid 1 milliGRAM(s) Oral daily  Formalin 2% in Sterile Water 500 milliLiter(s) 500 milliLiter(s) Topical once  heparin  Injectable 5000 Unit(s) SubCutaneous every 8 hours  influenza   Vaccine 0.5 milliLiter(s) IntraMuscular once  NIFEdipine XL 30 milliGRAM(s) Oral daily  oxybutynin 5 milliGRAM(s) Oral two times a day  senna 2 Tablet(s) Oral at bedtime  sodium chloride 0.9% lock flush 3 milliLiter(s) IV Push every 8 hours  sodium chloride 0.9%. 1000 milliLiter(s) (50 mL/Hr) IV Continuous <Continuous>  tamsulosin 0.4 milliGRAM(s) Oral at bedtime  tiotropium 18 MICROgram(s) Capsule 1 Capsule(s) Inhalation daily    MEDICATIONS  (PRN):  belladonna 16.2 mG/opium 30 mg Suppository 1 Suppository(s) Rectal every 6 hours PRN Bladder spasms  oxyCODONE    IR 5 milliGRAM(s) Oral every 4 hours PRN Severe Pain (7 - 10)      CAPILLARY BLOOD GLUCOSE        I&O's Summary    02 Dec 2019 07:01  -  03 Dec 2019 07:00  --------------------------------------------------------  IN: 1460 mL / OUT: 0 mL / NET: 1460 mL    03 Dec 2019 07:01  -  03 Dec 2019 14:13  --------------------------------------------------------  IN: 300 mL / OUT: 0 mL / NET: 300 mL        PHYSICAL EXAM:  Vital Signs Last 24 Hrs  T(C): 36.7 (03 Dec 2019 09:30), Max: 37.5 (02 Dec 2019 21:22)  T(F): 98 (03 Dec 2019 09:30), Max: 99.5 (02 Dec 2019 21:22)  HR: 95 (03 Dec 2019 09:30) (93 - 95)  BP: 134/54 (03 Dec 2019 09:30) (118/55 - 134/54)  BP(mean): --  RR: 20 (03 Dec 2019 09:30) (17 - 20)  SpO2: 97% (03 Dec 2019 09:30) (94% - 97%)    GENERAL: elderly WM, lying flat  in bed, moaning in discomfort  HEAD:  Atraumatic, Normocephalic  EYES: EOMI, PERRLA, conjunctiva and sclera clear  ENMT: Moist mucous membranes  NECK: Supple, No JVD  RESPIRATORY: Clear to auscultation bilaterally; No rales, rhonchi, wheezing, or rubs  CARDIOVASCULAR: Regular rate and rhythm; 3/6 systolic murmur  GASTROINTESTINAL: Soft, Nontender, Nondistended; Bowel sounds present  GENITOURINARY: beach/CBI, urine light pink  EXTREMITIES:  2+ Peripheral Pulses;  mild R arm dependent edema  NERVOUS SYSTEM: dysarthria, mild expressive aphasia, R sided weakness  HEME/LYMPH: No lymphadenopathy noted  SKIN: No rashes or lesions    LABS:                        9.1    10.21 )-----------( 268      ( 03 Dec 2019 05:27 )             28.5     12-03    136  |  105  |  28<H>  ----------------------------<  105<H>  3.7   |  22  |  1.41<H>    Ca    7.8<L>      03 Dec 2019 05:23                  RADIOLOGY & ADDITIONAL TESTS:  Results Reviewed:   Imaging Personally Reviewed:  Electrocardiogram Personally Reviewed:    COORDINATION OF CARE:  Care Discussed with Consultants/Other Providers [Y/N]: urology re overall care  Prior or Outpatient Records Reviewed [Y/N]:

## 2019-12-03 NOTE — PROGRESS NOTE ADULT - ASSESSMENT
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  Has been at [facility] since 10/31 with hematuria received 15 units PRBCs, gone to OR and had 15 hyperbaric oxygen treatments.  Per oncology pt has established diagnosis of low grade noninvasive bladder cancer from [facility] 10/18/19 with Dr. García Best.  Transferred for persistent hematuria, s/p trial of Alum x2, continues on CBI, for OR today

## 2019-12-03 NOTE — PROGRESS NOTE ADULT - SUBJECTIVE AND OBJECTIVE BOX
Afeb VSS  Pt has no c/o; had comfortable evening  Abd- soft NT ND   Lal/ CBI bldy on fast CBI  Had 2UPC yesterday Afeb VSS  Pt has no c/o; had comfortable evening  Abd- soft NT ND   R. arm with min swelling at elbow/ maybe wrist; no pain      had stoke on that side so neuro check not applicable; no IV  Lal/ CBI bldy on fast CBI  Had 2UPC yesterday

## 2019-12-03 NOTE — PROGRESS NOTE ADULT - ASSESSMENT
89 yo M admitted with gross hematuria likely 2/2 radiation cystitis, less likely due to hx low grade noninvasive bladder cancer (resected) transferred from OSH; was there 3 weeks;  had hyperbaric O2 treatment; went to OR; alum x2 CBI 11/26-11/27. 11/28 - NS CBI.; still bleeding, had 2UPC 12/2; going to OR today (12/3)    Plan:  -F/u AM labs  -OR today with formalin  - cont. CBI 89 yo M admitted with gross hematuria likely 2/2 radiation cystitis, less likely due to hx low grade noninvasive bladder cancer (resected) transferred from OSH; was there 3 weeks;  had hyperbaric O2 treatment; went to OR; alum x2 CBI 11/26-11/27. 11/28 - NS CBI.; still bleeding, had 2UPC 12/2; going to OR today (12/3)    Plan:  -F/u AM labs  -OR today with formalin  - cont. CBI  - elevate arm/observe

## 2019-12-03 NOTE — PROGRESS NOTE ADULT - PROBLEM SELECTOR PLAN 8
started discussion of ACP, wife very supportive, copy of MOLST given to pt/wife to review with family, hopeful for procedure to stop bleeding at this point

## 2019-12-04 ENCOUNTER — TRANSCRIPTION ENCOUNTER (OUTPATIENT)
Age: 84
End: 2019-12-04

## 2019-12-04 LAB
ANION GAP SERPL CALC-SCNC: 7 MMO/L — SIGNIFICANT CHANGE UP (ref 7–14)
BUN SERPL-MCNC: 30 MG/DL — HIGH (ref 7–23)
CALCIUM SERPL-MCNC: 7.8 MG/DL — LOW (ref 8.4–10.5)
CHLORIDE SERPL-SCNC: 107 MMOL/L — SIGNIFICANT CHANGE UP (ref 98–107)
CO2 SERPL-SCNC: 19 MMOL/L — LOW (ref 22–31)
CREAT SERPL-MCNC: 1.38 MG/DL — HIGH (ref 0.5–1.3)
GLUCOSE SERPL-MCNC: 133 MG/DL — HIGH (ref 70–99)
HCT VFR BLD CALC: 24.8 % — LOW (ref 39–50)
HGB BLD-MCNC: 7.8 G/DL — LOW (ref 13–17)
MCHC RBC-ENTMCNC: 28.9 PG — SIGNIFICANT CHANGE UP (ref 27–34)
MCHC RBC-ENTMCNC: 31.5 % — LOW (ref 32–36)
MCV RBC AUTO: 91.9 FL — SIGNIFICANT CHANGE UP (ref 80–100)
NRBC # FLD: 0 K/UL — SIGNIFICANT CHANGE UP (ref 0–0)
PLATELET # BLD AUTO: 314 K/UL — SIGNIFICANT CHANGE UP (ref 150–400)
PMV BLD: 8.6 FL — SIGNIFICANT CHANGE UP (ref 7–13)
POTASSIUM SERPL-MCNC: 3.9 MMOL/L — SIGNIFICANT CHANGE UP (ref 3.5–5.3)
POTASSIUM SERPL-SCNC: 3.9 MMOL/L — SIGNIFICANT CHANGE UP (ref 3.5–5.3)
RBC # BLD: 2.7 M/UL — LOW (ref 4.2–5.8)
RBC # FLD: 17.2 % — HIGH (ref 10.3–14.5)
SODIUM SERPL-SCNC: 133 MMOL/L — LOW (ref 135–145)
WBC # BLD: 13.17 K/UL — HIGH (ref 3.8–10.5)
WBC # FLD AUTO: 13.17 K/UL — HIGH (ref 3.8–10.5)

## 2019-12-04 PROCEDURE — 99233 SBSQ HOSP IP/OBS HIGH 50: CPT

## 2019-12-04 PROCEDURE — 93306 TTE W/DOPPLER COMPLETE: CPT | Mod: 26

## 2019-12-04 PROCEDURE — 99231 SBSQ HOSP IP/OBS SF/LOW 25: CPT

## 2019-12-04 RX ORDER — SODIUM CHLORIDE 9 MG/ML
1000 INJECTION INTRAMUSCULAR; INTRAVENOUS; SUBCUTANEOUS
Refills: 0 | Status: DISCONTINUED | OUTPATIENT
Start: 2019-12-04 | End: 2019-12-05

## 2019-12-04 RX ORDER — OXYCODONE HYDROCHLORIDE 5 MG/1
5 TABLET ORAL EVERY 4 HOURS
Refills: 0 | Status: DISCONTINUED | OUTPATIENT
Start: 2019-12-04 | End: 2019-12-05

## 2019-12-04 RX ADMIN — SODIUM CHLORIDE 3 MILLILITER(S): 9 INJECTION INTRAMUSCULAR; INTRAVENOUS; SUBCUTANEOUS at 21:18

## 2019-12-04 RX ADMIN — Medication 1 MILLIGRAM(S): at 12:14

## 2019-12-04 RX ADMIN — ALBUTEROL 2 PUFF(S): 90 AEROSOL, METERED ORAL at 06:18

## 2019-12-04 RX ADMIN — ALBUTEROL 2 PUFF(S): 90 AEROSOL, METERED ORAL at 21:21

## 2019-12-04 RX ADMIN — Medication 5 MILLIGRAM(S): at 18:05

## 2019-12-04 RX ADMIN — SODIUM CHLORIDE 3 MILLILITER(S): 9 INJECTION INTRAMUSCULAR; INTRAVENOUS; SUBCUTANEOUS at 06:14

## 2019-12-04 RX ADMIN — SENNA PLUS 2 TABLET(S): 8.6 TABLET ORAL at 21:21

## 2019-12-04 RX ADMIN — Medication 30 MILLIGRAM(S): at 06:18

## 2019-12-04 RX ADMIN — HEPARIN SODIUM 5000 UNIT(S): 5000 INJECTION INTRAVENOUS; SUBCUTANEOUS at 21:21

## 2019-12-04 RX ADMIN — Medication 325 MILLIGRAM(S): at 12:14

## 2019-12-04 RX ADMIN — ALBUTEROL 2 PUFF(S): 90 AEROSOL, METERED ORAL at 10:04

## 2019-12-04 RX ADMIN — ERYTHROPOIETIN 10000 UNIT(S): 10000 INJECTION, SOLUTION INTRAVENOUS; SUBCUTANEOUS at 12:49

## 2019-12-04 RX ADMIN — ATROPA BELLADONNA AND OPIUM 1 SUPPOSITORY(S): 16.2; 6 SUPPOSITORY RECTAL at 10:04

## 2019-12-04 RX ADMIN — PREGABALIN 1000 MICROGRAM(S): 225 CAPSULE ORAL at 12:14

## 2019-12-04 RX ADMIN — FINASTERIDE 5 MILLIGRAM(S): 5 TABLET, FILM COATED ORAL at 12:14

## 2019-12-04 RX ADMIN — ATROPA BELLADONNA AND OPIUM 1 SUPPOSITORY(S): 16.2; 6 SUPPOSITORY RECTAL at 16:36

## 2019-12-04 RX ADMIN — Medication 5 MILLIGRAM(S): at 06:18

## 2019-12-04 RX ADMIN — HEPARIN SODIUM 5000 UNIT(S): 5000 INJECTION INTRAVENOUS; SUBCUTANEOUS at 06:18

## 2019-12-04 RX ADMIN — HEPARIN SODIUM 5000 UNIT(S): 5000 INJECTION INTRAVENOUS; SUBCUTANEOUS at 14:16

## 2019-12-04 RX ADMIN — ATORVASTATIN CALCIUM 20 MILLIGRAM(S): 80 TABLET, FILM COATED ORAL at 21:21

## 2019-12-04 RX ADMIN — SODIUM CHLORIDE 3 MILLILITER(S): 9 INJECTION INTRAMUSCULAR; INTRAVENOUS; SUBCUTANEOUS at 14:14

## 2019-12-04 RX ADMIN — Medication 1 TABLET(S): at 12:14

## 2019-12-04 RX ADMIN — ATROPA BELLADONNA AND OPIUM 1 SUPPOSITORY(S): 16.2; 6 SUPPOSITORY RECTAL at 09:21

## 2019-12-04 RX ADMIN — TIOTROPIUM BROMIDE 1 CAPSULE(S): 18 CAPSULE ORAL; RESPIRATORY (INHALATION) at 10:04

## 2019-12-04 RX ADMIN — ATROPA BELLADONNA AND OPIUM 1 SUPPOSITORY(S): 16.2; 6 SUPPOSITORY RECTAL at 15:30

## 2019-12-04 NOTE — CONSULT NOTE ADULT - SUBJECTIVE AND OBJECTIVE BOX
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  [facility] with hematuria received 15 units PRBC  now planned for formalin instillation in OR  denies any chest pain, sob, palpitation, dizziness or syncope.     PAST MEDICAL & SURGICAL HISTORY:  Chronic kidney disease (CKD)  WILLIAM (acute kidney injury)  Cerebrovascular accident (CVA)  Prostate CA  CAD (coronary artery disease)          MEDICATIONS:  heparin  Injectable 5000 Unit(s) SubCutaneous every 8 hours  NIFEdipine XL 30 milliGRAM(s) Oral daily      ALBUTerol    90 MICROgram(s) HFA Inhaler 2 Puff(s) Inhalation every 6 hours  tiotropium 18 MICROgram(s) Capsule 1 Capsule(s) Inhalation daily    belladonna 16.2 mG/opium 30 mg Suppository 1 Suppository(s) Rectal every 6 hours PRN  oxyCODONE    IR 5 milliGRAM(s) Oral every 4 hours PRN    senna 2 Tablet(s) Oral at bedtime    atorvastatin 20 milliGRAM(s) Oral at bedtime  finasteride 5 milliGRAM(s) Oral daily    calcium carbonate   1250 mG (OsCal) 1 Tablet(s) Oral daily  cyanocobalamin 1000 MICROGram(s) Oral daily  epoetin gianfranco Injectable 11311 Unit(s) SubCutaneous <User Schedule>  ferrous    sulfate 325 milliGRAM(s) Oral daily  folic acid 1 milliGRAM(s) Oral daily  influenza   Vaccine 0.5 milliLiter(s) IntraMuscular once  oxybutynin 5 milliGRAM(s) Oral two times a day  sodium chloride 0.9% lock flush 3 milliLiter(s) IV Push every 8 hours      FAMILY HISTORY:      Non-contributory    SOCIAL HISTORY:    No tobacco, drugs or etoh    Allergies    No Known Allergies    Intolerances    	    REVIEW OF SYSTEMS:  as above  The rest of the 14 points ROS reviewed and except above they are unremarkable.        PHYSICAL EXAM:  T(C): 36.8 (12-04-19 @ 06:11), Max: 36.9 (12-03-19 @ 16:49)  HR: 98 (12-04-19 @ 06:11) (80 - 101)  BP: 120/43 (12-04-19 @ 06:11) (103/57 - 134/54)  RR: 18 (12-04-19 @ 06:11) (17 - 20)  SpO2: 94% (12-04-19 @ 06:11) (94% - 97%)  Wt(kg): --  I&O's Summary    03 Dec 2019 07:01  -  04 Dec 2019 07:00  --------------------------------------------------------  IN: 450 mL / OUT: 0 mL / NET: 450 mL        JVP: Normal  Neck: supple  Lung: clear   CV: S1 S2 , Murmur: pos ja   Abd: soft  Ext: No edema  neuro: Awake / alert  Psych: flat affect  Skin: normal      LABS/DATA:    TELEMETRY: 	    ECG:  	   	  CARDIAC MARKERS:                                      7.8    13.17 )-----------( 314      ( 04 Dec 2019 06:13 )             24.8     12-04    133<L>  |  107  |  30<H>  ----------------------------<  133<H>  3.9   |  19<L>  |  1.38<H>    Ca    7.8<L>      04 Dec 2019 06:13      proBNP:   Lipid Profile:   HgA1c:   TSH:

## 2019-12-04 NOTE — PROGRESS NOTE ADULT - ASSESSMENT
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  Has been at [facility] since 10/31 with hematuria received 15 units PRBCs, gone to OR and had 15 hyperbaric oxygen treatments.  Per oncology pt has established diagnosis of low grade noninvasive bladder cancer from [facility] 10/18/19 with Dr. García Best.  Transferred for persistent hematuria, s/p trial of Alum x2, continues on CBI, for OR today

## 2019-12-04 NOTE — PROGRESS NOTE ADULT - ASSESSMENT
89 yo M admitted with gross hematuria likely 2/2 radiation cystitis, less likely due to hx low grade noninvasive bladder cancer (resected) transferred from OSH; was there 3 weeks;  had hyperbaric O2 treatment; went to OR; alum x2 CBI 11/26-11/27. 11/28 - NS CBI.; still bleeding, flushed yesterday for large clots; OR cancelled yesterday, needs cards w/u as per anesthesia    Plan:  - cardiology consult  - echo  - labs  - cont. CBI

## 2019-12-04 NOTE — PROGRESS NOTE ADULT - PROBLEM SELECTOR PLAN 4
asymptomatic, not on ASA  systolic murmur likely AS, echo pending.    Pt is at elevated risk for palliative procedure.

## 2019-12-04 NOTE — PROGRESS NOTE ADULT - SUBJECTIVE AND OBJECTIVE BOX
[facility] Division of Hospital Medicine  Andreia Bello MD  Pager (M-F, 8A-5P):  In-house pager [phone]; Long-range pager [phone]  Other Times:  Please page Hospitalist in Charge -  In-house pager [phone]    Patient is a 89y old  Male who presents with a chief complaint of Hematuria (04 Dec 2019 08:54)      SUBJECTIVE / OVERNIGHT EVENTS: Pt continues to intermittently be uncomfortable from bladder discomfort, requires flushing.  Frustrated.  Daughter at bedside.  ADDITIONAL REVIEW OF SYSTEMS:    MEDICATIONS  (STANDING):  ALBUTerol    90 MICROgram(s) HFA Inhaler 2 Puff(s) Inhalation every 6 hours  atorvastatin 20 milliGRAM(s) Oral at bedtime  calcium carbonate   1250 mG (OsCal) 1 Tablet(s) Oral daily  cyanocobalamin 1000 MICROGram(s) Oral daily  epoetin gianfranco Injectable 13180 Unit(s) SubCutaneous <User Schedule>  ferrous    sulfate 325 milliGRAM(s) Oral daily  finasteride 5 milliGRAM(s) Oral daily  folic acid 1 milliGRAM(s) Oral daily  Formalin 2% in Sterile Water 500 milliLiter(s) 500 milliLiter(s) Topical once  heparin  Injectable 5000 Unit(s) SubCutaneous every 8 hours  influenza   Vaccine 0.5 milliLiter(s) IntraMuscular once  NIFEdipine XL 30 milliGRAM(s) Oral daily  oxybutynin 5 milliGRAM(s) Oral two times a day  senna 2 Tablet(s) Oral at bedtime  sodium chloride 0.9% lock flush 3 milliLiter(s) IV Push every 8 hours  tiotropium 18 MICROgram(s) Capsule 1 Capsule(s) Inhalation daily    MEDICATIONS  (PRN):  belladonna 16.2 mG/opium 30 mg Suppository 1 Suppository(s) Rectal every 6 hours PRN Bladder spasms  oxyCODONE    IR 5 milliGRAM(s) Oral every 4 hours PRN Severe Pain (7 - 10)      CAPILLARY BLOOD GLUCOSE        I&O's Summary    03 Dec 2019 07:01  -  04 Dec 2019 07:00  --------------------------------------------------------  IN: 450 mL / OUT: 0 mL / NET: 450 mL        PHYSICAL EXAM:  Vital Signs Last 24 Hrs  T(C): 36.6 (04 Dec 2019 10:29), Max: 36.9 (03 Dec 2019 16:49)  T(F): 97.8 (04 Dec 2019 10:29), Max: 98.5 (04 Dec 2019 01:57)  HR: 92 (04 Dec 2019 10:29) (80 - 101)  BP: 118/45 (04 Dec 2019 10:29) (103/57 - 120/51)  BP(mean): --  RR: 16 (04 Dec 2019 10:29) (16 - 19)  SpO2: 95% (04 Dec 2019 10:29) (94% - 97%)    GENERAL: elderly WM, lying flat  in bed, moaning in discomfort  HEAD:  Atraumatic, Normocephalic  EYES: EOMI, PERRLA, conjunctiva and sclera clear  ENMT: Moist mucous membranes  NECK: Supple, No JVD  RESPIRATORY: Clear to auscultation bilaterally; No rales, rhonchi, wheezing, or rubs  CARDIOVASCULAR: Regular rate and rhythm; 3/6 systolic murmur  GASTROINTESTINAL: Soft, Nontender, Nondistended; Bowel sounds present  GENITOURINARY: beach/CBI, punch colored urine  EXTREMITIES:  2+ Peripheral Pulses;  mild R arm dependent edema  NERVOUS SYSTEM: dysarthria, mild expressive aphasia, R sided weakness  HEME/LYMPH: No lymphadenopathy noted  SKIN: No rashes or lesions    LABS:                        7.8    13.17 )-----------( 314      ( 04 Dec 2019 06:13 )             24.8     12-04    133<L>  |  107  |  30<H>  ----------------------------<  133<H>  3.9   |  19<L>  |  1.38<H>    Ca    7.8<L>      04 Dec 2019 06:13                  RADIOLOGY & ADDITIONAL TESTS:  Results Reviewed:   Imaging Personally Reviewed:  Electrocardiogram Personally Reviewed:    COORDINATION OF CARE:  Care Discussed with Consultants/Other Providers [Y/N]: urology re overall care  Prior or Outpatient Records Reviewed [Y/N]:

## 2019-12-04 NOTE — PROGRESS NOTE ADULT - PROBLEM SELECTOR PLAN 2
bleeding presumed from radiation cystitis, low grade noninvasive bladder CA diagnosed recently  management per urology - s/p Alumx2, CBI  --> Urology plan for formalin instillation in OR.  Pt is scheduled for echo prior to OR.  This is a palliative procedure.

## 2019-12-04 NOTE — PROGRESS NOTE ADULT - SUBJECTIVE AND OBJECTIVE BOX
Afeb 115/48 101 96%RA    Pt has no c/o  Abd- soft NT ND   Lal/CBI pink  OR cancelled yesterday because anesthesia wanted an echo

## 2019-12-04 NOTE — CONSULT NOTE ADULT - ASSESSMENT
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  Has been at [facility] since 10/31 with hematuria received 15 units PRBCs, gone to OR and had 15 hyperbaric oxygen treatments.  Transferred here for further urologic management.        -urology management appreciated  -please note that pt has established diagnosis of low grade noninvasive bladder cancer from [facility] 10/18/19 with Dr. García Best    Anemia  -c/w oral iron  -c/w b12 and folate  -transfuse to keep Hgb > 8 given cardiac history.    Daniel Perera MD  Hematology/Oncology  Cell:  [phone]  Office Phone: [phone]  Office Fax:  [phone] [address] [place] [address]
CAD history of cabg, stent   on statin   off asa due to anemia and hematuria     murmur  obtain echo     PreOP  Based on current ACC/AHA guidelines, patient history and physical exam, the patient is considered to have elevated risk  obtain echo
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  Has been at [facility] since 10/31 with hematuria received 15 units PRBCs, gone to OR and had 15 hyperbaric oxygen treatments.  Unfortunately hematuria persists.

## 2019-12-05 DIAGNOSIS — R31.9 HEMATURIA, UNSPECIFIED: ICD-10-CM

## 2019-12-05 LAB
ANION GAP SERPL CALC-SCNC: 11 MMO/L — SIGNIFICANT CHANGE UP (ref 7–14)
BUN SERPL-MCNC: 30 MG/DL — HIGH (ref 7–23)
CALCIUM SERPL-MCNC: 7.9 MG/DL — LOW (ref 8.4–10.5)
CHLORIDE SERPL-SCNC: 105 MMOL/L — SIGNIFICANT CHANGE UP (ref 98–107)
CO2 SERPL-SCNC: 20 MMOL/L — LOW (ref 22–31)
CREAT SERPL-MCNC: 1.35 MG/DL — HIGH (ref 0.5–1.3)
GLUCOSE SERPL-MCNC: 132 MG/DL — HIGH (ref 70–99)
HCT VFR BLD CALC: 29.2 % — LOW (ref 39–50)
HGB BLD-MCNC: 9.4 G/DL — LOW (ref 13–17)
MCHC RBC-ENTMCNC: 28.9 PG — SIGNIFICANT CHANGE UP (ref 27–34)
MCHC RBC-ENTMCNC: 32.2 % — SIGNIFICANT CHANGE UP (ref 32–36)
MCV RBC AUTO: 89.8 FL — SIGNIFICANT CHANGE UP (ref 80–100)
NRBC # FLD: 0 K/UL — SIGNIFICANT CHANGE UP (ref 0–0)
PLATELET # BLD AUTO: 270 K/UL — SIGNIFICANT CHANGE UP (ref 150–400)
PMV BLD: 8.9 FL — SIGNIFICANT CHANGE UP (ref 7–13)
POTASSIUM SERPL-MCNC: 3.9 MMOL/L — SIGNIFICANT CHANGE UP (ref 3.5–5.3)
POTASSIUM SERPL-SCNC: 3.9 MMOL/L — SIGNIFICANT CHANGE UP (ref 3.5–5.3)
RBC # BLD: 3.25 M/UL — LOW (ref 4.2–5.8)
RBC # FLD: 16.5 % — HIGH (ref 10.3–14.5)
SODIUM SERPL-SCNC: 136 MMOL/L — SIGNIFICANT CHANGE UP (ref 135–145)
WBC # BLD: 10.83 K/UL — HIGH (ref 3.8–10.5)
WBC # FLD AUTO: 10.83 K/UL — HIGH (ref 3.8–10.5)

## 2019-12-05 PROCEDURE — 52001 CYSTO W/IRRG&EVAC MLT CLOTS: CPT | Mod: 59

## 2019-12-05 PROCEDURE — 51600 INJECTION FOR BLADDER X-RAY: CPT

## 2019-12-05 PROCEDURE — 52214 CYSTOSCOPY AND TREATMENT: CPT

## 2019-12-05 PROCEDURE — 99233 SBSQ HOSP IP/OBS HIGH 50: CPT

## 2019-12-05 RX ORDER — FOLIC ACID 0.8 MG
1 TABLET ORAL DAILY
Refills: 0 | Status: DISCONTINUED | OUTPATIENT
Start: 2019-12-05 | End: 2019-12-17

## 2019-12-05 RX ORDER — OXYCODONE HYDROCHLORIDE 5 MG/1
5 TABLET ORAL ONCE
Refills: 0 | Status: DISCONTINUED | OUTPATIENT
Start: 2019-12-05 | End: 2019-12-05

## 2019-12-05 RX ORDER — HEPARIN SODIUM 5000 [USP'U]/ML
5000 INJECTION INTRAVENOUS; SUBCUTANEOUS EVERY 8 HOURS
Refills: 0 | Status: DISCONTINUED | OUTPATIENT
Start: 2019-12-05 | End: 2019-12-17

## 2019-12-05 RX ORDER — ATORVASTATIN CALCIUM 80 MG/1
20 TABLET, FILM COATED ORAL AT BEDTIME
Refills: 0 | Status: DISCONTINUED | OUTPATIENT
Start: 2019-12-05 | End: 2019-12-17

## 2019-12-05 RX ORDER — SODIUM CHLORIDE 9 MG/ML
3 INJECTION INTRAMUSCULAR; INTRAVENOUS; SUBCUTANEOUS EVERY 8 HOURS
Refills: 0 | Status: DISCONTINUED | OUTPATIENT
Start: 2019-12-05 | End: 2019-12-17

## 2019-12-05 RX ORDER — ATROPA BELLADONNA AND OPIUM 16.2; 6 MG/1; MG/1
1 SUPPOSITORY RECTAL EVERY 6 HOURS
Refills: 0 | Status: DISCONTINUED | OUTPATIENT
Start: 2019-12-05 | End: 2019-12-11

## 2019-12-05 RX ORDER — ONDANSETRON 8 MG/1
4 TABLET, FILM COATED ORAL ONCE
Refills: 0 | Status: DISCONTINUED | OUTPATIENT
Start: 2019-12-05 | End: 2019-12-17

## 2019-12-05 RX ORDER — HYDROMORPHONE HYDROCHLORIDE 2 MG/ML
0.25 INJECTION INTRAMUSCULAR; INTRAVENOUS; SUBCUTANEOUS
Refills: 0 | Status: DISCONTINUED | OUTPATIENT
Start: 2019-12-05 | End: 2019-12-05

## 2019-12-05 RX ORDER — SENNA PLUS 8.6 MG/1
2 TABLET ORAL AT BEDTIME
Refills: 0 | Status: DISCONTINUED | OUTPATIENT
Start: 2019-12-05 | End: 2019-12-17

## 2019-12-05 RX ORDER — SODIUM CHLORIDE 9 MG/ML
1000 INJECTION INTRAMUSCULAR; INTRAVENOUS; SUBCUTANEOUS
Refills: 0 | Status: DISCONTINUED | OUTPATIENT
Start: 2019-12-05 | End: 2019-12-06

## 2019-12-05 RX ORDER — ERYTHROPOIETIN 10000 [IU]/ML
10000 INJECTION, SOLUTION INTRAVENOUS; SUBCUTANEOUS
Refills: 0 | Status: DISCONTINUED | OUTPATIENT
Start: 2019-12-05 | End: 2019-12-17

## 2019-12-05 RX ORDER — NIFEDIPINE 30 MG
30 TABLET, EXTENDED RELEASE 24 HR ORAL DAILY
Refills: 0 | Status: DISCONTINUED | OUTPATIENT
Start: 2019-12-05 | End: 2019-12-12

## 2019-12-05 RX ORDER — ALBUTEROL 90 UG/1
2 AEROSOL, METERED ORAL EVERY 6 HOURS
Refills: 0 | Status: DISCONTINUED | OUTPATIENT
Start: 2019-12-05 | End: 2019-12-17

## 2019-12-05 RX ORDER — OXYCODONE HYDROCHLORIDE 5 MG/1
5 TABLET ORAL ONCE
Refills: 0 | Status: DISCONTINUED | OUTPATIENT
Start: 2019-12-05 | End: 2019-12-07

## 2019-12-05 RX ORDER — ONDANSETRON 8 MG/1
4 TABLET, FILM COATED ORAL ONCE
Refills: 0 | Status: DISCONTINUED | OUTPATIENT
Start: 2019-12-05 | End: 2019-12-05

## 2019-12-05 RX ORDER — CALCIUM CARBONATE 500(1250)
1 TABLET ORAL DAILY
Refills: 0 | Status: DISCONTINUED | OUTPATIENT
Start: 2019-12-05 | End: 2019-12-17

## 2019-12-05 RX ORDER — OXYCODONE HYDROCHLORIDE 5 MG/1
5 TABLET ORAL EVERY 4 HOURS
Refills: 0 | Status: DISCONTINUED | OUTPATIENT
Start: 2019-12-05 | End: 2019-12-11

## 2019-12-05 RX ORDER — FERROUS SULFATE 325(65) MG
325 TABLET ORAL DAILY
Refills: 0 | Status: DISCONTINUED | OUTPATIENT
Start: 2019-12-05 | End: 2019-12-17

## 2019-12-05 RX ORDER — PREGABALIN 225 MG/1
1000 CAPSULE ORAL DAILY
Refills: 0 | Status: DISCONTINUED | OUTPATIENT
Start: 2019-12-05 | End: 2019-12-17

## 2019-12-05 RX ORDER — OXYBUTYNIN CHLORIDE 5 MG
5 TABLET ORAL
Refills: 0 | Status: DISCONTINUED | OUTPATIENT
Start: 2019-12-05 | End: 2019-12-08

## 2019-12-05 RX ORDER — FINASTERIDE 5 MG/1
5 TABLET, FILM COATED ORAL DAILY
Refills: 0 | Status: DISCONTINUED | OUTPATIENT
Start: 2019-12-05 | End: 2019-12-17

## 2019-12-05 RX ORDER — TIOTROPIUM BROMIDE 18 UG/1
1 CAPSULE ORAL; RESPIRATORY (INHALATION) DAILY
Refills: 0 | Status: DISCONTINUED | OUTPATIENT
Start: 2019-12-05 | End: 2019-12-17

## 2019-12-05 RX ADMIN — TIOTROPIUM BROMIDE 1 CAPSULE(S): 18 CAPSULE ORAL; RESPIRATORY (INHALATION) at 14:40

## 2019-12-05 RX ADMIN — ATORVASTATIN CALCIUM 20 MILLIGRAM(S): 80 TABLET, FILM COATED ORAL at 23:00

## 2019-12-05 RX ADMIN — SODIUM CHLORIDE 3 MILLILITER(S): 9 INJECTION INTRAMUSCULAR; INTRAVENOUS; SUBCUTANEOUS at 14:36

## 2019-12-05 RX ADMIN — HEPARIN SODIUM 5000 UNIT(S): 5000 INJECTION INTRAVENOUS; SUBCUTANEOUS at 05:35

## 2019-12-05 RX ADMIN — SODIUM CHLORIDE 3 MILLILITER(S): 9 INJECTION INTRAMUSCULAR; INTRAVENOUS; SUBCUTANEOUS at 05:34

## 2019-12-05 RX ADMIN — SENNA PLUS 2 TABLET(S): 8.6 TABLET ORAL at 23:00

## 2019-12-05 RX ADMIN — Medication 1 MILLIGRAM(S): at 14:41

## 2019-12-05 RX ADMIN — Medication 1 TABLET(S): at 14:40

## 2019-12-05 RX ADMIN — PREGABALIN 1000 MICROGRAM(S): 225 CAPSULE ORAL at 14:40

## 2019-12-05 RX ADMIN — ALBUTEROL 2 PUFF(S): 90 AEROSOL, METERED ORAL at 14:40

## 2019-12-05 RX ADMIN — FINASTERIDE 5 MILLIGRAM(S): 5 TABLET, FILM COATED ORAL at 14:41

## 2019-12-05 RX ADMIN — Medication 5 MILLIGRAM(S): at 05:35

## 2019-12-05 RX ADMIN — ALBUTEROL 2 PUFF(S): 90 AEROSOL, METERED ORAL at 05:36

## 2019-12-05 RX ADMIN — Medication 30 MILLIGRAM(S): at 05:35

## 2019-12-05 RX ADMIN — SODIUM CHLORIDE 3 MILLILITER(S): 9 INJECTION INTRAMUSCULAR; INTRAVENOUS; SUBCUTANEOUS at 22:55

## 2019-12-05 RX ADMIN — HEPARIN SODIUM 5000 UNIT(S): 5000 INJECTION INTRAVENOUS; SUBCUTANEOUS at 23:00

## 2019-12-05 NOTE — PROGRESS NOTE ADULT - SUBJECTIVE AND OBJECTIVE BOX
[facility] Division of Hospital Medicine  Andreia Bello MD  Pager (M-F, 8A-5P):  In-house pager [phone]; Long-range pager [phone]  Other Times:  Please page Hospitalist in Charge -  In-house pager [phone]    Patient is a 89y old  Male who presents with a chief complaint of Hematuria (05 Dec 2019 08:06)      SUBJECTIVE / OVERNIGHT EVENTS: Required irrigation over night.  Seen this morning, resting in bed.  No chest pain, SOB.  ADDITIONAL REVIEW OF SYSTEMS:    MEDICATIONS  (STANDING):  ALBUTerol    90 MICROgram(s) HFA Inhaler 2 Puff(s) Inhalation every 6 hours  atorvastatin 20 milliGRAM(s) Oral at bedtime  calcium carbonate   1250 mG (OsCal) 1 Tablet(s) Oral daily  cyanocobalamin 1000 MICROGram(s) Oral daily  epoetin gianfranco Injectable 66028 Unit(s) SubCutaneous <User Schedule>  ferrous    sulfate 325 milliGRAM(s) Oral daily  finasteride 5 milliGRAM(s) Oral daily  folic acid 1 milliGRAM(s) Oral daily  Formalin 2% in Sterile Water 500 milliLiter(s),Formalin 2% in sterile water 500 milliLiter(s),Formalin 2% in sterile water 500 milliLiter(s) 500 milliLiter(s) Topical once  heparin  Injectable 5000 Unit(s) SubCutaneous every 8 hours  influenza   Vaccine 0.5 milliLiter(s) IntraMuscular once  NIFEdipine XL 30 milliGRAM(s) Oral daily  oxybutynin 5 milliGRAM(s) Oral two times a day  senna 2 Tablet(s) Oral at bedtime  sodium chloride 0.9% lock flush 3 milliLiter(s) IV Push every 8 hours  sodium chloride 0.9%. 1000 milliLiter(s) (50 mL/Hr) IV Continuous <Continuous>  tiotropium 18 MICROgram(s) Capsule 1 Capsule(s) Inhalation daily    MEDICATIONS  (PRN):  belladonna 16.2 mG/opium 30 mg Suppository 1 Suppository(s) Rectal every 6 hours PRN Bladder spasms  oxyCODONE    IR 5 milliGRAM(s) Oral every 4 hours PRN Severe Pain (7 - 10)      CAPILLARY BLOOD GLUCOSE        I&O's Summary    05 Dec 2019 07:01  -  05 Dec 2019 15:28  --------------------------------------------------------  IN: 300 mL / OUT: 0 mL / NET: 300 mL        PHYSICAL EXAM:  Vital Signs Last 24 Hrs  T(C): 36.3 (05 Dec 2019 09:17), Max: 36.8 (04 Dec 2019 17:20)  T(F): 97.4 (05 Dec 2019 09:17), Max: 98.3 (04 Dec 2019 17:20)  HR: 88 (05 Dec 2019 09:17) (88 - 96)  BP: 121/48 (05 Dec 2019 09:17) (108/55 - 128/47)  BP(mean): --  RR: 18 (05 Dec 2019 09:17) (18 - 18)  SpO2: 96% (05 Dec 2019 09:17) (94% - 97%)    GENERAL: elderly WM, lying flat  in bed, comfortable  HEAD:  Atraumatic, Normocephalic  EYES: EOMI, PERRLA, conjunctiva and sclera clear  ENMT: Moist mucous membranes  NECK: Supple, No JVD  RESPIRATORY: Clear to auscultation bilaterally; No rales, rhonchi, wheezing, or rubs  CARDIOVASCULAR: Regular rate and rhythm; 3/6 systolic murmur  GASTROINTESTINAL: Soft, Nontender, Nondistended; Bowel sounds present  GENITOURINARY: beach/CBI, punch colored urine  EXTREMITIES:  2+ Peripheral Pulses;  mild R arm dependent edema  NERVOUS SYSTEM: dysarthria, mild expressive aphasia, R sided weakness  HEME/LYMPH: No lymphadenopathy noted  SKIN: No rashes or lesions    LABS:                        9.4    10.83 )-----------( 270      ( 05 Dec 2019 06:05 )             29.2     12-05    136  |  105  |  30<H>  ----------------------------<  132<H>  3.9   |  20<L>  |  1.35<H>    Ca    7.9<L>      05 Dec 2019 06:05                  RADIOLOGY & ADDITIONAL TESTS:  Results Reviewed:   Imaging Personally Reviewed:  Electrocardiogram Personally Reviewed:    COORDINATION OF CARE:  Care Discussed with Consultants/Other Providers [Y/N]: urology re overall care  Prior or Outpatient Records Reviewed [Y/N]:

## 2019-12-05 NOTE — PROGRESS NOTE ADULT - PROBLEM SELECTOR PLAN 4
asymptomatic, not on ASA  mild to mod MR, mild to mod AS  Pt is at elevated risk for palliative procedure.

## 2019-12-05 NOTE — PROGRESS NOTE ADULT - PROBLEM SELECTOR PLAN 1
from bleeding presumed from radiation cystitis, low grade noninvasive bladder CA diagnosed recently - trend H&H, aim to keep Hb>8 given cardiac history (s/p ?23 units PRBCs thus far...) f/u   - on iron, B12, fa  - epo tiw.

## 2019-12-05 NOTE — PROGRESS NOTE ADULT - PROBLEM SELECTOR PLAN 2
bleeding presumed from radiation cystitis, low grade noninvasive bladder CA diagnosed recently  management per urology - s/p Alumx2, CBI  --> Urology plan for formalin instillation in OR.  Appreciate cards input, pt at elevated risk.  This is a palliative procedure.

## 2019-12-05 NOTE — BRIEF OPERATIVE NOTE - NSICDXBRIEFPROCEDURE_GEN_ALL_CORE_FT
PROCEDURES:  Cystoscopy with formalin instillation 05-Dec-2019 19:21:52  Drew Esparza  Cystoscopy, with cystogram 05-Dec-2019 19:21:43  Drew Esparza

## 2019-12-05 NOTE — PROGRESS NOTE ADULT - ASSESSMENT
Detail Level: Zone 89 year old male s/p Cystoscopy with formalin instillation, stable    -Continue CBI, monitor color  -Analgesia prn  -DVT prophylaxis  -Incentive spirometry  -Regular diet  -OOB  -AM labs

## 2019-12-05 NOTE — PROGRESS NOTE ADULT - SUBJECTIVE AND OBJECTIVE BOX
Subjective: Patient seen and examined. No new events except as noted.     SUBJECTIVE/ROS:  resting   NAD      MEDICATIONS:  MEDICATIONS  (STANDING):  ALBUTerol    90 MICROgram(s) HFA Inhaler 2 Puff(s) Inhalation every 6 hours  atorvastatin 20 milliGRAM(s) Oral at bedtime  calcium carbonate   1250 mG (OsCal) 1 Tablet(s) Oral daily  cyanocobalamin 1000 MICROGram(s) Oral daily  epoetin gianfranco Injectable 29841 Unit(s) SubCutaneous <User Schedule>  ferrous    sulfate 325 milliGRAM(s) Oral daily  finasteride 5 milliGRAM(s) Oral daily  folic acid 1 milliGRAM(s) Oral daily  Formalin 2% in Sterile Water 500 milliLiter(s),Formalin 2% in sterile water 500 milliLiter(s),Formalin 2% in sterile water 500 milliLiter(s) 500 milliLiter(s) Topical once  heparin  Injectable 5000 Unit(s) SubCutaneous every 8 hours  influenza   Vaccine 0.5 milliLiter(s) IntraMuscular once  NIFEdipine XL 30 milliGRAM(s) Oral daily  oxybutynin 5 milliGRAM(s) Oral two times a day  senna 2 Tablet(s) Oral at bedtime  sodium chloride 0.9% lock flush 3 milliLiter(s) IV Push every 8 hours  sodium chloride 0.9%. 1000 milliLiter(s) (50 mL/Hr) IV Continuous <Continuous>  tiotropium 18 MICROgram(s) Capsule 1 Capsule(s) Inhalation daily      PHYSICAL EXAM:  T(C): 36.4 (12-05-19 @ 05:32), Max: 37 (12-04-19 @ 14:00)  HR: 91 (12-05-19 @ 05:32) (91 - 96)  BP: 128/47 (12-05-19 @ 05:32) (108/49 - 128/47)  RR: 18 (12-05-19 @ 05:32) (16 - 18)  SpO2: 94% (12-05-19 @ 05:32) (94% - 97%)  Wt(kg): --  I&O's Summary          JVP: Normal  Neck: supple  Lung: clear   CV: S1 S2 , Murmur:  Abd: soft  Ext: No edema  neuro: Awake / alert  Psych: flat affect  Skin: normal``    LABS/DATA:    CARDIAC MARKERS:                                7.8    13.17 )-----------( 314      ( 04 Dec 2019 06:13 )             24.8     12-04    133<L>  |  107  |  30<H>  ----------------------------<  133<H>  3.9   |  19<L>  |  1.38<H>    Ca    7.8<L>      04 Dec 2019 06:13      proBNP:   Lipid Profile:   HgA1c:   TSH:     TELE:  EKG:    < from: Transthoracic Echocardiogram (12.04.19 @ 16:06) >  CONCLUSIONS:  Technically difficult study.  1. Mitral annular calcification, otherwise normal mitral  valve. Mild-moderate mitral regurgitation.  2. Aorticvalve leaflet morphology not well visualized.  Peak transaortic valve gradient equals 25 mm Hg, mean  transaortic valve gradient equals 14 mm Hg, consistent with  at least mild to moderate aortic stenosis. Mild aortic  regurgitation.  3. Normal left ventricular internal dimensions and wall  thicknesses.  4. Endocardium not well visualized; grossly normal left  ventricular systolic function.  5. The right ventricle is not well visualized; grossly  normal right ventricular systolic function.  ------------------------------------------------------------------------  Confirmed on  12/4/2019 - 17:07:57 by Varghese Garza M.D.  ------------------------------------------------------------------------    < end of copied text >

## 2019-12-05 NOTE — PROGRESS NOTE ADULT - ASSESSMENT
89 yo M admitted with gross hematuria likely 2/2 radiation cystitis, less likely due to hx low grade noninvasive bladder cancer (resected) transferred from OSH; was there 3 weeks;  had hyperbaric O2 treatment; went to OR; alum x2 CBI 11/26-11/27. 11/28 - NS CBI.; still bleeding, flushed yesterday for large clots; OR cancelled 12/4, needs cards w/u as per anesthesia    Plan:  - cardiology consult appreciated, f/u further recs  - f/u medicine  - echo results reviewed  - f/u AM labs  - cont. CBI, monitor color  - NPO for OR today, IVF  - OOB with assistance

## 2019-12-05 NOTE — PROGRESS NOTE ADULT - SUBJECTIVE AND OBJECTIVE BOX
Note    Post op Check    s/p Cystoscopy with formalin instillation  EBL 0ml    Pt seen and examined without complaints, pain controlled, denies nausea.    Vital Signs Last 24 Hrs  T(C): 37 (05 Dec 2019 19:10), Max: 37.1 (05 Dec 2019 15:24)  T(F): 98.6 (05 Dec 2019 19:10), Max: 98.8 (05 Dec 2019 15:24)  HR: 81 (05 Dec 2019 21:15) (78 - 97)  BP: 108/48 (05 Dec 2019 21:15) (99/45 - 136/59)  BP(mean): 61 (05 Dec 2019 21:15) (58 - 86)  RR: 16 (05 Dec 2019 21:15) (15 - 21)  SpO2: 100% (05 Dec 2019 21:15) (94% - 100%)    I&O's Summary    05 Dec 2019 07:01  -  05 Dec 2019 22:27  --------------------------------------------------------  IN: 300 mL / OUT: 0 mL / NET: 300 mL    PHYSICAL EXAM:   Constitutional: A+O, no distress    Respiratory: clear    Cardiovascular: regular    Gastrointestinal: soft, nontender, no distention    Genitourinary: beach with CBI running, draining well, clear light pink.    Extremities: venodynes in place.

## 2019-12-05 NOTE — PROGRESS NOTE ADULT - ASSESSMENT
CAD history of cabg, stent   on statin   off asa due to anemia and hematuria     murmur  mild to mod MR, mild to mod AS  stable     PreOP  Based on current ACC/AHA guidelines, patient history and physical exam, the patient is considered to have elevated risk  no absolute CV objection to this time sensitive surgery

## 2019-12-05 NOTE — PROGRESS NOTE ADULT - SUBJECTIVE AND OBJECTIVE BOX
Overnight events:  Required manual irrigation overnight, moderate clot evacuated, transfused 2u PRBC for acute blood loss anemia    Subjective:  Pt offers no complaints    Objective:    Vital signs  T(C): , Max: 37 (12-04-19 @ 14:00)  HR: 91 (12-05-19 @ 05:32)  BP: 128/47 (12-05-19 @ 05:32)  SpO2: 94% (12-05-19 @ 05:32)  Wt(kg): --    Output   CBI punch  12-03 @ 07:01  -  12-04 @ 07:00  --------------------------------------------------------  IN: 450 mL / OUT: 0 mL / NET: 450 mL        Gen: NAD  Abd: soft, nontender, no suprapubic tenderness or fullness  : yong secured    Labs: pending

## 2019-12-06 LAB
ANION GAP SERPL CALC-SCNC: 12 MMO/L — SIGNIFICANT CHANGE UP (ref 7–14)
BLD GP AB SCN SERPL QL: NEGATIVE — SIGNIFICANT CHANGE UP
BUN SERPL-MCNC: 36 MG/DL — HIGH (ref 7–23)
CALCIUM SERPL-MCNC: 7.8 MG/DL — LOW (ref 8.4–10.5)
CHLORIDE SERPL-SCNC: 107 MMOL/L — SIGNIFICANT CHANGE UP (ref 98–107)
CO2 SERPL-SCNC: 18 MMOL/L — LOW (ref 22–31)
CREAT SERPL-MCNC: 1.41 MG/DL — HIGH (ref 0.5–1.3)
GLUCOSE SERPL-MCNC: 160 MG/DL — HIGH (ref 70–99)
HCT VFR BLD CALC: 27.3 % — LOW (ref 39–50)
HGB BLD-MCNC: 8.5 G/DL — LOW (ref 13–17)
MCHC RBC-ENTMCNC: 28.9 PG — SIGNIFICANT CHANGE UP (ref 27–34)
MCHC RBC-ENTMCNC: 31.1 % — LOW (ref 32–36)
MCV RBC AUTO: 92.9 FL — SIGNIFICANT CHANGE UP (ref 80–100)
NRBC # FLD: 0 K/UL — SIGNIFICANT CHANGE UP (ref 0–0)
PLATELET # BLD AUTO: 262 K/UL — SIGNIFICANT CHANGE UP (ref 150–400)
PMV BLD: 8.8 FL — SIGNIFICANT CHANGE UP (ref 7–13)
POTASSIUM SERPL-MCNC: 4.6 MMOL/L — SIGNIFICANT CHANGE UP (ref 3.5–5.3)
POTASSIUM SERPL-SCNC: 4.6 MMOL/L — SIGNIFICANT CHANGE UP (ref 3.5–5.3)
RBC # BLD: 2.94 M/UL — LOW (ref 4.2–5.8)
RBC # FLD: 16.5 % — HIGH (ref 10.3–14.5)
RH IG SCN BLD-IMP: POSITIVE — SIGNIFICANT CHANGE UP
SODIUM SERPL-SCNC: 137 MMOL/L — SIGNIFICANT CHANGE UP (ref 135–145)
WBC # BLD: 6.78 K/UL — SIGNIFICANT CHANGE UP (ref 3.8–10.5)
WBC # FLD AUTO: 6.78 K/UL — SIGNIFICANT CHANGE UP (ref 3.8–10.5)

## 2019-12-06 PROCEDURE — 99231 SBSQ HOSP IP/OBS SF/LOW 25: CPT

## 2019-12-06 PROCEDURE — 99233 SBSQ HOSP IP/OBS HIGH 50: CPT

## 2019-12-06 RX ORDER — ACETAMINOPHEN 500 MG
1000 TABLET ORAL ONCE
Refills: 0 | Status: COMPLETED | OUTPATIENT
Start: 2019-12-06 | End: 2019-12-06

## 2019-12-06 RX ORDER — POLYETHYLENE GLYCOL 3350 17 G/17G
17 POWDER, FOR SOLUTION ORAL DAILY
Refills: 0 | Status: DISCONTINUED | OUTPATIENT
Start: 2019-12-06 | End: 2019-12-17

## 2019-12-06 RX ADMIN — Medication 5 MILLIGRAM(S): at 06:11

## 2019-12-06 RX ADMIN — ALBUTEROL 2 PUFF(S): 90 AEROSOL, METERED ORAL at 21:49

## 2019-12-06 RX ADMIN — ATORVASTATIN CALCIUM 20 MILLIGRAM(S): 80 TABLET, FILM COATED ORAL at 21:50

## 2019-12-06 RX ADMIN — Medication 1 TABLET(S): at 12:52

## 2019-12-06 RX ADMIN — Medication 1 MILLIGRAM(S): at 12:52

## 2019-12-06 RX ADMIN — ALBUTEROL 2 PUFF(S): 90 AEROSOL, METERED ORAL at 18:23

## 2019-12-06 RX ADMIN — ALBUTEROL 2 PUFF(S): 90 AEROSOL, METERED ORAL at 06:10

## 2019-12-06 RX ADMIN — SODIUM CHLORIDE 3 MILLILITER(S): 9 INJECTION INTRAMUSCULAR; INTRAVENOUS; SUBCUTANEOUS at 21:49

## 2019-12-06 RX ADMIN — SENNA PLUS 2 TABLET(S): 8.6 TABLET ORAL at 21:50

## 2019-12-06 RX ADMIN — Medication 30 MILLIGRAM(S): at 06:11

## 2019-12-06 RX ADMIN — ERYTHROPOIETIN 10000 UNIT(S): 10000 INJECTION, SOLUTION INTRAVENOUS; SUBCUTANEOUS at 12:53

## 2019-12-06 RX ADMIN — HEPARIN SODIUM 5000 UNIT(S): 5000 INJECTION INTRAVENOUS; SUBCUTANEOUS at 14:58

## 2019-12-06 RX ADMIN — SODIUM CHLORIDE 3 MILLILITER(S): 9 INJECTION INTRAMUSCULAR; INTRAVENOUS; SUBCUTANEOUS at 13:48

## 2019-12-06 RX ADMIN — FINASTERIDE 5 MILLIGRAM(S): 5 TABLET, FILM COATED ORAL at 12:51

## 2019-12-06 RX ADMIN — PREGABALIN 1000 MICROGRAM(S): 225 CAPSULE ORAL at 12:52

## 2019-12-06 RX ADMIN — Medication 400 MILLIGRAM(S): at 12:51

## 2019-12-06 RX ADMIN — POLYETHYLENE GLYCOL 3350 17 GRAM(S): 17 POWDER, FOR SOLUTION ORAL at 12:52

## 2019-12-06 RX ADMIN — HEPARIN SODIUM 5000 UNIT(S): 5000 INJECTION INTRAVENOUS; SUBCUTANEOUS at 21:50

## 2019-12-06 RX ADMIN — ALBUTEROL 2 PUFF(S): 90 AEROSOL, METERED ORAL at 09:57

## 2019-12-06 RX ADMIN — TIOTROPIUM BROMIDE 1 CAPSULE(S): 18 CAPSULE ORAL; RESPIRATORY (INHALATION) at 09:57

## 2019-12-06 RX ADMIN — SODIUM CHLORIDE 3 MILLILITER(S): 9 INJECTION INTRAMUSCULAR; INTRAVENOUS; SUBCUTANEOUS at 06:00

## 2019-12-06 RX ADMIN — HEPARIN SODIUM 5000 UNIT(S): 5000 INJECTION INTRAVENOUS; SUBCUTANEOUS at 06:11

## 2019-12-06 RX ADMIN — Medication 5 MILLIGRAM(S): at 18:23

## 2019-12-06 RX ADMIN — Medication 1000 MILLIGRAM(S): at 13:49

## 2019-12-06 RX ADMIN — Medication 325 MILLIGRAM(S): at 12:52

## 2019-12-06 NOTE — PROGRESS NOTE ADULT - ASSESSMENT
89 yo M admitted with gross hematuria likely 2/2 radiation cystitis, less likely due to hx low grade noninvasive bladder cancer (resected) transferred from OSH; was there 3 weeks;  had hyperbaric O2 treatment; went to OR; alum x2 CBI 11/26-11/27. 11/28 - NS CBI.; still bleeding, flushed yesterday for large clots; OR cancelled 12/4, needs cards w/u as per anesthesia, cardiology recs in chart, 12/5 pt underwent cysto, clot evac (300cc), fulguration, instillation of formalin, CBI light pink 12/6    Plan:  - continue CBI, monitor color  - AM CBC reviewed, f/u BMP  - f/u medicine, cardiology  - IVL  - reg diet w/ ensure  - OOB with assistance  - rehab planning

## 2019-12-06 NOTE — PROGRESS NOTE ADULT - PROBLEM SELECTOR PLAN 2
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 And on 12/5 cysto, clot evac, fulguration and instillation of formalin.

## 2019-12-06 NOTE — PROGRESS NOTE ADULT - ASSESSMENT
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  Has been at [facility] since 10/31 with hematuria received 15 units PRBCs, gone to OR and had 15 hyperbaric oxygen treatments.  Per oncology pt has established diagnosis of low grade noninvasive bladder cancer from [facility] 10/18/19 with Dr. García Best.  Transferred for persistent hematuria, s/p trial of Alum x2, continues on CBI, for OR 12/5, now s/p

## 2019-12-06 NOTE — PROGRESS NOTE ADULT - SUBJECTIVE AND OBJECTIVE BOX
PROGRESS NOTE:     Patient is a 89y old  Male who presents with a chief complaint of Hematuria (06 Dec 2019 10:33)      SUBJECTIVE / OVERNIGHT EVENTS:  Patient seen and examined this morning. No new complaints, S/P cysto, clot evac, fulguration and instillation of formalin.  ADDITIONAL REVIEW OF SYSTEMS:  Denies any fevers or chills.    MEDICATIONS  (STANDING):  ALBUTerol    90 MICROgram(s) HFA Inhaler 2 Puff(s) Inhalation every 6 hours  atorvastatin 20 milliGRAM(s) Oral at bedtime  calcium carbonate   1250 mG (OsCal) 1 Tablet(s) Oral daily  cyanocobalamin 1000 MICROGram(s) Oral daily  epoetin gianfranco Injectable 39098 Unit(s) SubCutaneous <User Schedule>  ferrous    sulfate 325 milliGRAM(s) Oral daily  finasteride 5 milliGRAM(s) Oral daily  folic acid 1 milliGRAM(s) Oral daily  heparin  Injectable 5000 Unit(s) SubCutaneous every 8 hours  influenza   Vaccine 0.5 milliLiter(s) IntraMuscular once  NIFEdipine XL 30 milliGRAM(s) Oral daily  oxybutynin 5 milliGRAM(s) Oral two times a day  polyethylene glycol 3350 17 Gram(s) Oral daily  senna 2 Tablet(s) Oral at bedtime  sodium chloride 0.9% lock flush 3 milliLiter(s) IV Push every 8 hours  tiotropium 18 MICROgram(s) Capsule 1 Capsule(s) Inhalation daily    MEDICATIONS  (PRN):  belladonna 16.2 mG/opium 30 mg Suppository 1 Suppository(s) Rectal every 6 hours PRN Bladder spasms  ondansetron Injectable 4 milliGRAM(s) IV Push once PRN Nausea and/or Vomiting  oxyCODONE    IR 5 milliGRAM(s) Oral every 4 hours PRN Severe Pain (7 - 10)  oxyCODONE    IR 5 milliGRAM(s) Oral once PRN Moderate Pain (4 - 6)      CAPILLARY BLOOD GLUCOSE        I&O's Summary    05 Dec 2019 07:01  -  06 Dec 2019 07:00  --------------------------------------------------------  IN: 450 mL / OUT: 0 mL / NET: 450 mL        PHYSICAL EXAM:  Vital Signs Last 24 Hrs  T(C): 36.8 (06 Dec 2019 09:06), Max: 37.1 (05 Dec 2019 15:24)  T(F): 98.3 (06 Dec 2019 09:06), Max: 98.8 (05 Dec 2019 15:24)  HR: 94 (06 Dec 2019 09:06) (77 - 97)  BP: 111/49 (06 Dec 2019 09:06) (99/45 - 136/59)  BP(mean): 63 (05 Dec 2019 22:00) (58 - 86)  RR: 18 (06 Dec 2019 09:06) (15 - 24)  SpO2: 96% (06 Dec 2019 09:06) (96% - 100%)    CONSTITUTIONAL: NAD, well-developed, elderly pleasant  RESPIRATORY: Normal respiratory effort; lungs are clear to auscultation bilaterally  CARDIOVASCULAR: Regular rate and rhythm, normal S1 and S2, +systolic murmur; No lower extremity edema; Peripheral pulses are 2+ bilaterally  ABDOMEN: Nontender to palpation, normoactive bowel sounds, no rebound/guarding; No hepatosplenomegaly  MUSCLOSKELETAL: no clubbing or cyanosis of digits; no joint swelling or tenderness to palpation, Upper extremity swelling. Mild dysarthria and expressive aphasia  PSYCH: A+O to person, place, and time; affect appropriate    LABS:                        8.5    6.78  )-----------( 262      ( 06 Dec 2019 06:45 )             27.3     12-06    137  |  107  |  36<H>  ----------------------------<  160<H>  4.6   |  18<L>  |  1.41<H>    Ca    7.8<L>      06 Dec 2019 06:45                  RADIOLOGY & ADDITIONAL TESTS:  Results Reviewed:   Imaging Personally Reviewed:  Electrocardiogram Personally Reviewed:    COORDINATION OF CARE:  Care Discussed with Consultants/Other Providers [Y/N]: Discussed with Uro PA  Prior or Outpatient Records Reviewed [Y/N]:

## 2019-12-06 NOTE — PROGRESS NOTE ADULT - SUBJECTIVE AND OBJECTIVE BOX
Overnight events:  None, CBI remained light pink did not require any manual irrigations    Subjective:  Pt offers no complaints, denies bladder spasms    Objective:    Vital signs  T(C): , Max: 37.1 (12-05-19 @ 15:24)  HR: 82 (12-06-19 @ 06:08)  BP: 119/52 (12-06-19 @ 06:08)  SpO2: 97% (12-06-19 @ 06:08)  Wt(kg): --    Output   CBI: light pink on mod drip  12-05 @ 07:01  -  12-06 @ 07:00  --------------------------------------------------------  IN: 450 mL / OUT: 0 mL / NET: 450 mL        Gen: NAD  Abd: soft, nontender, no suprapubic tenderness or fullness  : beach secured    Labs                        8.5    6.78  )-----------( 262      ( 06 Dec 2019 06:45 )             27.3

## 2019-12-06 NOTE — PROGRESS NOTE ADULT - ASSESSMENT
CAD history of cabg, stent   on statin   off asa due to anemia and hematuria   resume in future once deemed safe     murmur  mild to mod MR, mild to mod AS  stable     HTN  stable

## 2019-12-06 NOTE — PROGRESS NOTE ADULT - SUBJECTIVE AND OBJECTIVE BOX
Subjective: Patient seen and examined. No new events except as noted.     SUBJECTIVE/ROS:  pt had surgery  denies any chest pain, sob, palpitation, dizziness or syncope.       MEDICATIONS:  MEDICATIONS  (STANDING):  ALBUTerol    90 MICROgram(s) HFA Inhaler 2 Puff(s) Inhalation every 6 hours  atorvastatin 20 milliGRAM(s) Oral at bedtime  calcium carbonate   1250 mG (OsCal) 1 Tablet(s) Oral daily  cyanocobalamin 1000 MICROGram(s) Oral daily  epoetin gianfranco Injectable 55580 Unit(s) SubCutaneous <User Schedule>  ferrous    sulfate 325 milliGRAM(s) Oral daily  finasteride 5 milliGRAM(s) Oral daily  folic acid 1 milliGRAM(s) Oral daily  heparin  Injectable 5000 Unit(s) SubCutaneous every 8 hours  influenza   Vaccine 0.5 milliLiter(s) IntraMuscular once  NIFEdipine XL 30 milliGRAM(s) Oral daily  oxybutynin 5 milliGRAM(s) Oral two times a day  polyethylene glycol 3350 17 Gram(s) Oral daily  senna 2 Tablet(s) Oral at bedtime  sodium chloride 0.9% lock flush 3 milliLiter(s) IV Push every 8 hours  tiotropium 18 MICROgram(s) Capsule 1 Capsule(s) Inhalation daily      PHYSICAL EXAM:  T(C): 36.6 (12-06-19 @ 06:08), Max: 37.1 (12-05-19 @ 15:24)  HR: 82 (12-06-19 @ 06:08) (77 - 97)  BP: 119/52 (12-06-19 @ 06:08) (99/45 - 136/59)  RR: 18 (12-06-19 @ 06:08) (15 - 24)  SpO2: 97% (12-06-19 @ 06:08) (96% - 100%)  Wt(kg): --  I&O's Summary    05 Dec 2019 07:01  -  06 Dec 2019 07:00  --------------------------------------------------------  IN: 450 mL / OUT: 0 mL / NET: 450 mL      Height (cm): 175.26 (12-05 @ 15:55)  Weight (kg): 60.4 (12-05 @ 15:55)  BMI (kg/m2): 19.7 (12-05 @ 15:55)  BSA (m2): 1.74 (12-05 @ 15:55)      JVP: Normal  Neck: supple  Lung: clear   CV: S1 S2 , Murmur:  Abd: soft  Ext: No edema  neuro: Awake / alert  Psych: flat affect  Skin: normal``    LABS/DATA:    CARDIAC MARKERS:                                8.5    6.78  )-----------( 262      ( 06 Dec 2019 06:45 )             27.3     12-05    136  |  105  |  30<H>  ----------------------------<  132<H>  3.9   |  20<L>  |  1.35<H>    Ca    7.9<L>      05 Dec 2019 06:05      proBNP:   Lipid Profile:   HgA1c:   TSH:     TELE:  EKG:

## 2019-12-06 NOTE — PROGRESS NOTE ADULT - SUBJECTIVE AND OBJECTIVE BOX
ANESTHESIA POSTOP CHECK    89y Male POSTOP DAY 1 S/P   [ x] General Anesthesia  [ ] Romeo Anesthesia  [ ] MAC    Vital Signs Last 24 Hrs  T(C): 36.8 (06 Dec 2019 09:06), Max: 37.1 (05 Dec 2019 15:24)  T(F): 98.3 (06 Dec 2019 09:06), Max: 98.8 (05 Dec 2019 15:24)  HR: 94 (06 Dec 2019 09:06) (77 - 97)  BP: 111/49 (06 Dec 2019 09:06) (99/45 - 136/59)  BP(mean): 63 (05 Dec 2019 22:00) (58 - 86)  RR: 18 (06 Dec 2019 09:06) (15 - 24)  SpO2: 96% (06 Dec 2019 09:06) (96% - 100%)  I&O's Summary    05 Dec 2019 07:01  -  06 Dec 2019 07:00  --------------------------------------------------------  IN: 450 mL / OUT: 0 mL / NET: 450 mL        [x ] NO APPARENT ANESTHESIA COMPLICATIONS      Comments:

## 2019-12-06 NOTE — PROGRESS NOTE ADULT - PROBLEM SELECTOR PLAN 1
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

## 2019-12-06 NOTE — PROGRESS NOTE ADULT - PROBLEM SELECTOR PLAN 8
As per prior hospitalist -started discussion of ACP, wife very supportive, copy of MOLST given to pt/wife to review with family, hopeful for procedure to stop bleeding at this point

## 2019-12-07 ENCOUNTER — TRANSCRIPTION ENCOUNTER (OUTPATIENT)
Age: 84
End: 2019-12-07

## 2019-12-07 LAB
ANION GAP SERPL CALC-SCNC: 12 MMO/L — SIGNIFICANT CHANGE UP (ref 7–14)
BUN SERPL-MCNC: 41 MG/DL — HIGH (ref 7–23)
CALCIUM SERPL-MCNC: 7.8 MG/DL — LOW (ref 8.4–10.5)
CHLORIDE SERPL-SCNC: 105 MMOL/L — SIGNIFICANT CHANGE UP (ref 98–107)
CO2 SERPL-SCNC: 19 MMOL/L — LOW (ref 22–31)
CREAT SERPL-MCNC: 1.44 MG/DL — HIGH (ref 0.5–1.3)
GLUCOSE SERPL-MCNC: 114 MG/DL — HIGH (ref 70–99)
HCT VFR BLD CALC: 24.1 % — LOW (ref 39–50)
HGB BLD-MCNC: 7.6 G/DL — LOW (ref 13–17)
MCHC RBC-ENTMCNC: 28.8 PG — SIGNIFICANT CHANGE UP (ref 27–34)
MCHC RBC-ENTMCNC: 31.5 % — LOW (ref 32–36)
MCV RBC AUTO: 91.3 FL — SIGNIFICANT CHANGE UP (ref 80–100)
NRBC # FLD: 0 K/UL — SIGNIFICANT CHANGE UP (ref 0–0)
PLATELET # BLD AUTO: 291 K/UL — SIGNIFICANT CHANGE UP (ref 150–400)
PMV BLD: 8.9 FL — SIGNIFICANT CHANGE UP (ref 7–13)
POTASSIUM SERPL-MCNC: 4.1 MMOL/L — SIGNIFICANT CHANGE UP (ref 3.5–5.3)
POTASSIUM SERPL-SCNC: 4.1 MMOL/L — SIGNIFICANT CHANGE UP (ref 3.5–5.3)
RBC # BLD: 2.64 M/UL — LOW (ref 4.2–5.8)
RBC # FLD: 15.9 % — HIGH (ref 10.3–14.5)
SODIUM SERPL-SCNC: 136 MMOL/L — SIGNIFICANT CHANGE UP (ref 135–145)
WBC # BLD: 9.69 K/UL — SIGNIFICANT CHANGE UP (ref 3.8–10.5)
WBC # FLD AUTO: 9.69 K/UL — SIGNIFICANT CHANGE UP (ref 3.8–10.5)

## 2019-12-07 PROCEDURE — 99233 SBSQ HOSP IP/OBS HIGH 50: CPT

## 2019-12-07 PROCEDURE — 99231 SBSQ HOSP IP/OBS SF/LOW 25: CPT

## 2019-12-07 RX ADMIN — SODIUM CHLORIDE 3 MILLILITER(S): 9 INJECTION INTRAMUSCULAR; INTRAVENOUS; SUBCUTANEOUS at 13:11

## 2019-12-07 RX ADMIN — Medication 5 MILLIGRAM(S): at 18:07

## 2019-12-07 RX ADMIN — Medication 30 MILLIGRAM(S): at 06:47

## 2019-12-07 RX ADMIN — OXYCODONE HYDROCHLORIDE 5 MILLIGRAM(S): 5 TABLET ORAL at 01:12

## 2019-12-07 RX ADMIN — Medication 1 TABLET(S): at 12:40

## 2019-12-07 RX ADMIN — HEPARIN SODIUM 5000 UNIT(S): 5000 INJECTION INTRAVENOUS; SUBCUTANEOUS at 21:40

## 2019-12-07 RX ADMIN — ATORVASTATIN CALCIUM 20 MILLIGRAM(S): 80 TABLET, FILM COATED ORAL at 21:40

## 2019-12-07 RX ADMIN — HEPARIN SODIUM 5000 UNIT(S): 5000 INJECTION INTRAVENOUS; SUBCUTANEOUS at 14:26

## 2019-12-07 RX ADMIN — ALBUTEROL 2 PUFF(S): 90 AEROSOL, METERED ORAL at 12:39

## 2019-12-07 RX ADMIN — PREGABALIN 1000 MICROGRAM(S): 225 CAPSULE ORAL at 12:40

## 2019-12-07 RX ADMIN — SENNA PLUS 2 TABLET(S): 8.6 TABLET ORAL at 21:40

## 2019-12-07 RX ADMIN — SODIUM CHLORIDE 3 MILLILITER(S): 9 INJECTION INTRAMUSCULAR; INTRAVENOUS; SUBCUTANEOUS at 05:07

## 2019-12-07 RX ADMIN — OXYCODONE HYDROCHLORIDE 5 MILLIGRAM(S): 5 TABLET ORAL at 01:56

## 2019-12-07 RX ADMIN — ALBUTEROL 2 PUFF(S): 90 AEROSOL, METERED ORAL at 18:08

## 2019-12-07 RX ADMIN — HEPARIN SODIUM 5000 UNIT(S): 5000 INJECTION INTRAVENOUS; SUBCUTANEOUS at 05:09

## 2019-12-07 RX ADMIN — Medication 1 MILLIGRAM(S): at 12:40

## 2019-12-07 RX ADMIN — ALBUTEROL 2 PUFF(S): 90 AEROSOL, METERED ORAL at 05:08

## 2019-12-07 RX ADMIN — FINASTERIDE 5 MILLIGRAM(S): 5 TABLET, FILM COATED ORAL at 12:40

## 2019-12-07 RX ADMIN — SODIUM CHLORIDE 3 MILLILITER(S): 9 INJECTION INTRAMUSCULAR; INTRAVENOUS; SUBCUTANEOUS at 21:37

## 2019-12-07 RX ADMIN — Medication 5 MILLIGRAM(S): at 05:08

## 2019-12-07 RX ADMIN — TIOTROPIUM BROMIDE 1 CAPSULE(S): 18 CAPSULE ORAL; RESPIRATORY (INHALATION) at 12:39

## 2019-12-07 RX ADMIN — Medication 325 MILLIGRAM(S): at 12:40

## 2019-12-07 RX ADMIN — POLYETHYLENE GLYCOL 3350 17 GRAM(S): 17 POWDER, FOR SOLUTION ORAL at 12:39

## 2019-12-07 NOTE — PROGRESS NOTE ADULT - ASSESSMENT
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  Has been at [facility] since 10/31 with hematuria received 15 units PRBCs, gone to OR and had 15 hyperbaric oxygen treatments.  Transferred here for further urologic management.  He was also noted to have low grade noninvasive UCC of bladder in 10/2018      -urology management appreciated for RT cystitis.     Anemia  -c/w oral iron  -c/w b12 and folate  -on procrit  -transfuse to keep Hgb > 8 given cardiac history, for unit of PRBC today  -monitor CBC daily, in light of hematuria    Thank you for the courtesy of this consultation and we will continue to follow.    Devaughn Boland MD  [facility]  Cell: [phone]

## 2019-12-07 NOTE — PROGRESS NOTE ADULT - SUBJECTIVE AND OBJECTIVE BOX
PROGRESS NOTE:     Patient is a 89y old  Male who presents with a chief complaint of Hematuria (07 Dec 2019 07:45)      SUBJECTIVE / OVERNIGHT EVENTS:  Patient seen and examined this morning. No new complaints, S/P 12/5 cysto, clot evac, fulguration and instillation of formalin.    ADDITIONAL REVIEW OF SYSTEMS:  Denies fevers, chills or shortness of breath.    MEDICATIONS  (STANDING):  ALBUTerol    90 MICROgram(s) HFA Inhaler 2 Puff(s) Inhalation every 6 hours  atorvastatin 20 milliGRAM(s) Oral at bedtime  calcium carbonate   1250 mG (OsCal) 1 Tablet(s) Oral daily  cyanocobalamin 1000 MICROGram(s) Oral daily  epoetin gianfranco Injectable 43971 Unit(s) SubCutaneous <User Schedule>  ferrous    sulfate 325 milliGRAM(s) Oral daily  finasteride 5 milliGRAM(s) Oral daily  folic acid 1 milliGRAM(s) Oral daily  heparin  Injectable 5000 Unit(s) SubCutaneous every 8 hours  influenza   Vaccine 0.5 milliLiter(s) IntraMuscular once  NIFEdipine XL 30 milliGRAM(s) Oral daily  oxybutynin 5 milliGRAM(s) Oral two times a day  polyethylene glycol 3350 17 Gram(s) Oral daily  senna 2 Tablet(s) Oral at bedtime  sodium chloride 0.9% lock flush 3 milliLiter(s) IV Push every 8 hours  tiotropium 18 MICROgram(s) Capsule 1 Capsule(s) Inhalation daily    MEDICATIONS  (PRN):  belladonna 16.2 mG/opium 30 mg Suppository 1 Suppository(s) Rectal every 6 hours PRN Bladder spasms  ondansetron Injectable 4 milliGRAM(s) IV Push once PRN Nausea and/or Vomiting  oxyCODONE    IR 5 milliGRAM(s) Oral every 4 hours PRN Severe Pain (7 - 10)      CAPILLARY BLOOD GLUCOSE        I&O's Summary      PHYSICAL EXAM:  Vital Signs Last 24 Hrs  T(C): 36.6 (07 Dec 2019 05:03), Max: 36.8 (06 Dec 2019 09:06)  T(F): 97.9 (07 Dec 2019 05:03), Max: 98.3 (06 Dec 2019 09:06)  HR: 81 (07 Dec 2019 06:46) (79 - 94)  BP: 113/43 (07 Dec 2019 06:46) (101/40 - 113/43)  BP(mean): --  RR: 17 (07 Dec 2019 05:03) (17 - 18)  SpO2: 95% (07 Dec 2019 05:03) (95% - 99%)    CONSTITUTIONAL: NAD, well-developed, elderly pleasant  RESPIRATORY: Normal respiratory effort; lungs are clear to auscultation bilaterally  CARDIOVASCULAR: Regular rate and rhythm, normal S1 and S2, +systolic murmur; No lower extremity edema; Peripheral pulses are 2+ bilaterally  ABDOMEN: Nontender to palpation, normoactive bowel sounds, no rebound/guarding; No hepatosplenomegaly  MUSCLOSKELETAL: no clubbing or cyanosis of digits; no joint swelling or tenderness to palpation, Upper extremity swelling. Mild dysarthria and expressive aphasia  PSYCH: A+O to person, place, and time; affect appropriate    LABS:                        8.5    6.78  )-----------( 262      ( 06 Dec 2019 06:45 )             27.3     12-06    137  |  107  |  36<H>  ----------------------------<  160<H>  4.6   |  18<L>  |  1.41<H>    Ca    7.8<L>      06 Dec 2019 06:45                  RADIOLOGY & ADDITIONAL TESTS:  Results Reviewed:   Imaging Personally Reviewed:  Electrocardiogram Personally Reviewed:    COORDINATION OF CARE:  Care Discussed with Consultants/Other Providers [Y/N]: Discussed with Uro PA  Prior or Outpatient Records Reviewed [Y/N]:

## 2019-12-07 NOTE — DISCHARGE NOTE PROVIDER - NSDCMRMEDTOKEN_GEN_ALL_CORE_FT
calcium carbonate 1250 mg (500 mg elemental calcium) oral tablet: 1 tab(s) orally once a day  Crestor 5 mg oral tablet: 1 tab(s) orally once a day  cyanocobalamin 1000 mcg oral tablet: 1 tab(s) orally once a day  Epogen 10,000 units/mL injectable solution: 1 milliliter(s) injectable 3 times a week  Mon/Wed/Fri  ferrous sulfate 324 mg (65 mg elemental iron) oral tablet: 1 tab(s) orally once a day  finasteride 5 mg oral tablet: 1 tab(s) orally once a day  Flomax 0.4 mg oral capsule: 1 cap(s) orally once a day  folic acid 1 mg oral tablet: 1 tab(s) orally once a day  ipratropium-albuterol 0.5 mg-2.5 mg/3 mLinhalation solution: 3 milliliter(s) inhaled 3 times a day  NIFEdipine 30 mg oral tablet, extended release: 1 tab(s) orally once a day  Senna 8.6 mg oral tablet: 1 tab(s) orally once a day (at bedtime) amLODIPine 5 mg oral tablet: 1 tab(s) orally once a day  calcium carbonate 1250 mg (500 mg elemental calcium) oral tablet: 1 tab(s) orally once a day  Crestor 5 mg oral tablet: 1 tab(s) orally once a day  cyanocobalamin 1000 mcg oral tablet: 1 tab(s) orally once a day  Epogen 10,000 units/mL injectable solution: 1 milliliter(s) injectable 3 times a week  Mon/Wed/Fri  ferrous sulfate 324 mg (65 mg elemental iron) oral tablet: 1 tab(s) orally once a day  finasteride 5 mg oral tablet: 1 tab(s) orally once a day  Flomax 0.4 mg oral capsule: 1 cap(s) orally once a day  folic acid 1 mg oral tablet: 1 tab(s) orally once a day  ipratropium-albuterol 0.5 mg-2.5 mg/3 mLinhalation solution: 3 milliliter(s) inhaled 3 times a day  polyethylene glycol 3350 oral powder for reconstitution: 17 gram(s) orally once a day  Senna 8.6 mg oral tablet: 1 tab(s) orally once a day (at bedtime)

## 2019-12-07 NOTE — PROGRESS NOTE ADULT - SUBJECTIVE AND OBJECTIVE BOX
Overnight events:  None    Subjective:  Pt offers no complaints    Objective:    Vital signs  T(C): , Max: 36.8 (12-06-19 @ 09:06)  HR: 81 (12-07-19 @ 06:46)  BP: 113/43 (12-07-19 @ 06:46)  SpO2: 95% (12-07-19 @ 05:03)  Wt(kg): --    Output   CBI light pink    Gen: NAD  Abd: soft, nontender, no suprapubic tenderness or fullness  : beach secured    Labs: pending

## 2019-12-07 NOTE — PROGRESS NOTE ADULT - SUBJECTIVE AND OBJECTIVE BOX
Patient seen, examined- lethargic today      MEDICATIONS  (STANDING):  ALBUTerol    90 MICROgram(s) HFA Inhaler 2 Puff(s) Inhalation every 6 hours  atorvastatin 20 milliGRAM(s) Oral at bedtime  calcium carbonate   1250 mG (OsCal) 1 Tablet(s) Oral daily  cyanocobalamin 1000 MICROGram(s) Oral daily  epoetin gianfranco Injectable 22324 Unit(s) SubCutaneous <User Schedule>  ferrous    sulfate 325 milliGRAM(s) Oral daily  finasteride 5 milliGRAM(s) Oral daily  folic acid 1 milliGRAM(s) Oral daily  heparin  Injectable 5000 Unit(s) SubCutaneous every 8 hours  influenza   Vaccine 0.5 milliLiter(s) IntraMuscular once  NIFEdipine XL 30 milliGRAM(s) Oral daily  oxybutynin 5 milliGRAM(s) Oral two times a day  polyethylene glycol 3350 17 Gram(s) Oral daily  senna 2 Tablet(s) Oral at bedtime  sodium chloride 0.9% lock flush 3 milliLiter(s) IV Push every 8 hours  tiotropium 18 MICROgram(s) Capsule 1 Capsule(s) Inhalation daily    MEDICATIONS  (PRN):  belladonna 16.2 mG/opium 30 mg Suppository 1 Suppository(s) Rectal every 6 hours PRN Bladder spasms  ondansetron Injectable 4 milliGRAM(s) IV Push once PRN Nausea and/or Vomiting  oxyCODONE    IR 5 milliGRAM(s) Oral every 4 hours PRN Severe Pain (7 - 10)      ROS  No fever, sweats, chills  No epistaxis, HA, sore throat  No CP, SOB, cough, sputum  No n/v/d, abd pain, melena, hematochezia  No edema  No rash  No anxiety  No back pain, joint pain  No bleeding, bruising  No dysuria, hematuria    Vital Signs Last 24 Hrs  T(C): 36.3 (07 Dec 2019 14:25), Max: 36.9 (07 Dec 2019 09:11)  T(F): 97.4 (07 Dec 2019 14:25), Max: 98.5 (07 Dec 2019 09:11)  HR: 84 (07 Dec 2019 14:25) (79 - 90)  BP: 109/40 (07 Dec 2019 14:25) (101/40 - 113/43)  BP(mean): --  RR: 16 (07 Dec 2019 14:25) (16 - 18)  SpO2: 94% (07 Dec 2019 14:25) (94% - 99%)    PE  NAD  Awake, alert  Anicteric, MMM  RRR  CTAB  Abd soft, NT, ND  No c/c/e  No rash grossly  FROM                          7.6    9.69  )-----------( 291      ( 07 Dec 2019 06:55 )             24.1       12-07    136  |  105  |  41<H>  ----------------------------<  114<H>  4.1   |  19<L>  |  1.44<H>    Ca    7.8<L>      07 Dec 2019 06:55

## 2019-12-07 NOTE — PROVIDER CONTACT NOTE (OTHER) - ASSESSMENT
Patient's LUE with weeping edema, Patient's family states they have "changed the sheets multiple times and it still is getting wet". Sheets with notable yellow discoloration and moist.

## 2019-12-07 NOTE — DISCHARGE NOTE PROVIDER - HOSPITAL COURSE
This 89 yo M hx of prostate CA, radiation cystitis transferred from [facility] where he had been admitted since 10/31 with hematuria, received multiple blood transfusions, underwent cystogram which revealed  Over the last year or so, he was treated with cystoscopy which revealed some bladder tumors which were resected. He was hyperbaric oxygen to which he responded. He most recently had another bout of gross hematuria. Cystoscopy revealed some small tumors that was resected, diffuse oozing and telangiectasias consistent with radiation cystitis. Cystogram reveled brisk bilateral reflux, thus formalin instillation was not performed. He failed to respond to hyperbaric oxygen. He has received transfusions. He was transferred here for Alum instillations into the bladder since [facility]'s pharmacy did not have Alum.                     89 yo M admitted with gross hematuria likely 2/2 radiation cystitis, less likely due to hx low grade noninvasive bladder cancer (resected) transferred from OSH; was there 3 weeks;  had hyperbaric O2 treatment; went to OR; alum x2 CBI 11/26-11/27. 11/28 - NS CBI.; still bleeding, flushed yesterday for large clots; OR cancelled 12/4, needs cards w/u as per anesthesia, cardiology recs in chart, 12/5 pt underwent cysto, clot evac (300cc), fulguration, instillation of formalin, CBI light pink 12/6, weaned, remains light pink, no manual irrigations required 12/7 This 89 yo M hx of prostate CA, radiation cystitis transferred from [facility] where he had been admitted since 10/31 with hematuria, received multiple blood transfusions, underwent cystoscopy which revealed some small bladder tumors which were resected (low grade bladder CA),  diffuse oozing and telangiectasias consistent with radiation cystitis cystogram reveled brisk bilateral reflux, thus formalin instillation was not performed. He was treated with hyperbaric oxygen but hematuria persisted.  Pt transferred to [facility] for alum instillation. Pt was transfused again for acute blood loss anemia, alum CBI 11/26-11/27, then placed back on NS CBI 11/28, hematuria persisted and required manual irrigations. Was to undergo surgery on 12/4 however case cancelled pending echo and cardiology consult which were obtained, pt high risk but required cysto.  12/5 pt underwent cysto, clot evac (300cc), fulguration, instillation of formalin.  CBI light pink 12/6, weaned, remains light pink, no manual irrigations required 12/7.  Received an additional unit of PRBC on 12/7.  PT recs rehab 89 yo M admitted with gross hematuria likely 2/2 radiation cystitis, less likely due to hx low grade noninvasive bladder cancer (resected) transferred from OSH; was there 3 weeks;  had hyperbaric O2 treatment; went to OR; alum x2 CBI 11/26-11/27. 11/28 - NS CBI.; still bleeding, flushed yesterday for large clots; OR cancelled 12/4, needs cards w/u as per anesthesia, cardiology recs in chart, 12/5 pt underwent cysto, clot evac (300cc), fulguration, instillation of formalin, CBI light pink 12/6, weaned, remains light pink, no manual irrigations required 12/7, clamped 12/9, however became hematuric again, required manual irrigation for approx 200cc clot, Alum CBI restarted on moderate drip, this am alum CBI stopped draining, irrigated, will switch to NS CBI and obtain CT cystogram, CT cystogram no extrav, to restart alum 12/12, 12/13 beach unable to be flushed, removed and new 24 fr 3 way placed, irrigated to clear light punch, sterile water restarted at slow drip, changed to NS CBI 12/13, remains clear with stable HCT. 12/15 Off CBI remains clear; crit stable; urine yellow ; to rehab with beach

## 2019-12-07 NOTE — PROGRESS NOTE ADULT - ASSESSMENT
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  Has been at [facility] since 10/31 with hematuria received 15 units PRBCs, gone to OR and had 15 hyperbaric oxygen treatments.  Per oncology pt has established diagnosis of low grade noninvasive bladder cancer from [facility] 10/18/19 with Dr. García Best.  Transferred for persistent hematuria, s/p trial of Alum x2, continues on CBI, for OR 12/5, now s/p  cysto, clot evac, fulguration and instillation of formalin.

## 2019-12-07 NOTE — DISCHARGE NOTE PROVIDER - CARE PROVIDER_API CALL
Dell Carroll)  Urology  [address] [place] [address]  Phone: [phone]  Fax: [phone]  Follow Up Time:

## 2019-12-07 NOTE — PROGRESS NOTE ADULT - SUBJECTIVE AND OBJECTIVE BOX
Subjective: Patient seen and examined. No new events except as noted.     SUBJECTIVE/ROS:  No chest pain, dyspnea, palpitation, or dizziness.       MEDICATIONS:  MEDICATIONS  (STANDING):  ALBUTerol    90 MICROgram(s) HFA Inhaler 2 Puff(s) Inhalation every 6 hours  atorvastatin 20 milliGRAM(s) Oral at bedtime  calcium carbonate   1250 mG (OsCal) 1 Tablet(s) Oral daily  cyanocobalamin 1000 MICROGram(s) Oral daily  epoetin gianfranco Injectable 41475 Unit(s) SubCutaneous <User Schedule>  ferrous    sulfate 325 milliGRAM(s) Oral daily  finasteride 5 milliGRAM(s) Oral daily  folic acid 1 milliGRAM(s) Oral daily  heparin  Injectable 5000 Unit(s) SubCutaneous every 8 hours  influenza   Vaccine 0.5 milliLiter(s) IntraMuscular once  NIFEdipine XL 30 milliGRAM(s) Oral daily  oxybutynin 5 milliGRAM(s) Oral two times a day  polyethylene glycol 3350 17 Gram(s) Oral daily  senna 2 Tablet(s) Oral at bedtime  sodium chloride 0.9% lock flush 3 milliLiter(s) IV Push every 8 hours  tiotropium 18 MICROgram(s) Capsule 1 Capsule(s) Inhalation daily      PHYSICAL EXAM:  T(C): 36.6 (12-07-19 @ 05:03), Max: 36.8 (12-06-19 @ 09:06)  HR: 81 (12-07-19 @ 06:46) (79 - 94)  BP: 113/43 (12-07-19 @ 06:46) (101/40 - 113/43)  RR: 17 (12-07-19 @ 05:03) (17 - 18)  SpO2: 95% (12-07-19 @ 05:03) (95% - 99%)  Wt(kg): --  I&O's Summary          JVP: Normal  Neck: supple  Lung: clear   CV: S1 S2 , Murmur:  Abd: soft  Ext: No edema  neuro: Awake / alert  Psych: flat affect  Skin: normal``    LABS/DATA:    CARDIAC MARKERS:                                8.5    6.78  )-----------( 262      ( 06 Dec 2019 06:45 )             27.3     12-06    137  |  107  |  36<H>  ----------------------------<  160<H>  4.6   |  18<L>  |  1.41<H>    Ca    7.8<L>      06 Dec 2019 06:45      proBNP:   Lipid Profile:   HgA1c:   TSH:     TELE:  EKG:

## 2019-12-07 NOTE — DISCHARGE NOTE PROVIDER - NSDCCPCAREPLAN_GEN_ALL_CORE_FT
PRINCIPAL DISCHARGE DIAGNOSIS  Diagnosis: Hematuria  Assessment and Plan of Treatment: Drink plenty of fluids; beach care as instructed  F/U with urologist when d/c'd from rehab

## 2019-12-07 NOTE — PROGRESS NOTE ADULT - ASSESSMENT
CAD history of cabg, stent   on statin   off asa due to anemia and hematuria   resume in future once deemed safe     murmur  mild to mod MR, mild to mod AS  stable     HTN  stable     fu labs

## 2019-12-07 NOTE — PROGRESS NOTE ADULT - ASSESSMENT
89 yo M admitted with gross hematuria likely 2/2 radiation cystitis, less likely due to hx low grade noninvasive bladder cancer (resected) transferred from OSH; was there 3 weeks;  had hyperbaric O2 treatment; went to OR; alum x2 CBI 11/26-11/27. 11/28 - NS CBI.; still bleeding, flushed yesterday for large clots; OR cancelled 12/4, needs cards w/u as per anesthesia, cardiology recs in chart, 12/5 pt underwent cysto, clot evac (300cc), fulguration, instillation of formalin, CBI light pink 12/6, weaned, remains light pink, no manual irrigations required 12/7    Plan:  - continue CBI, wean as tolerated, monitor color  - f/u AM labs  - f/u medicine, cardiology  - IVL  - reg diet w/ ensure  - OOB with assistance  - rehab planning

## 2019-12-08 LAB
ANION GAP SERPL CALC-SCNC: 7 MMO/L — SIGNIFICANT CHANGE UP (ref 7–14)
BLD GP AB SCN SERPL QL: NEGATIVE — SIGNIFICANT CHANGE UP
BUN SERPL-MCNC: 33 MG/DL — HIGH (ref 7–23)
CALCIUM SERPL-MCNC: 8 MG/DL — LOW (ref 8.4–10.5)
CHLORIDE SERPL-SCNC: 107 MMOL/L — SIGNIFICANT CHANGE UP (ref 98–107)
CO2 SERPL-SCNC: 22 MMOL/L — SIGNIFICANT CHANGE UP (ref 22–31)
CREAT SERPL-MCNC: 1.25 MG/DL — SIGNIFICANT CHANGE UP (ref 0.5–1.3)
GLUCOSE SERPL-MCNC: 105 MG/DL — HIGH (ref 70–99)
HCT VFR BLD CALC: 25.3 % — LOW (ref 39–50)
HGB BLD-MCNC: 8.3 G/DL — LOW (ref 13–17)
MCHC RBC-ENTMCNC: 28.6 PG — SIGNIFICANT CHANGE UP (ref 27–34)
MCHC RBC-ENTMCNC: 32.8 % — SIGNIFICANT CHANGE UP (ref 32–36)
MCV RBC AUTO: 87.2 FL — SIGNIFICANT CHANGE UP (ref 80–100)
NRBC # FLD: 0 K/UL — SIGNIFICANT CHANGE UP (ref 0–0)
PLATELET # BLD AUTO: 245 K/UL — SIGNIFICANT CHANGE UP (ref 150–400)
PMV BLD: 8.3 FL — SIGNIFICANT CHANGE UP (ref 7–13)
POTASSIUM SERPL-MCNC: 4.2 MMOL/L — SIGNIFICANT CHANGE UP (ref 3.5–5.3)
POTASSIUM SERPL-SCNC: 4.2 MMOL/L — SIGNIFICANT CHANGE UP (ref 3.5–5.3)
RBC # BLD: 2.9 M/UL — LOW (ref 4.2–5.8)
RBC # FLD: 17.1 % — HIGH (ref 10.3–14.5)
RH IG SCN BLD-IMP: POSITIVE — SIGNIFICANT CHANGE UP
SODIUM SERPL-SCNC: 136 MMOL/L — SIGNIFICANT CHANGE UP (ref 135–145)
WBC # BLD: 7.64 K/UL — SIGNIFICANT CHANGE UP (ref 3.8–10.5)
WBC # FLD AUTO: 7.64 K/UL — SIGNIFICANT CHANGE UP (ref 3.8–10.5)

## 2019-12-08 PROCEDURE — 99231 SBSQ HOSP IP/OBS SF/LOW 25: CPT

## 2019-12-08 PROCEDURE — 99233 SBSQ HOSP IP/OBS HIGH 50: CPT

## 2019-12-08 RX ADMIN — ALBUTEROL 2 PUFF(S): 90 AEROSOL, METERED ORAL at 05:39

## 2019-12-08 RX ADMIN — Medication 30 MILLIGRAM(S): at 05:39

## 2019-12-08 RX ADMIN — SODIUM CHLORIDE 3 MILLILITER(S): 9 INJECTION INTRAMUSCULAR; INTRAVENOUS; SUBCUTANEOUS at 15:49

## 2019-12-08 RX ADMIN — SODIUM CHLORIDE 3 MILLILITER(S): 9 INJECTION INTRAMUSCULAR; INTRAVENOUS; SUBCUTANEOUS at 22:01

## 2019-12-08 RX ADMIN — Medication 325 MILLIGRAM(S): at 13:34

## 2019-12-08 RX ADMIN — Medication 1 MILLIGRAM(S): at 13:35

## 2019-12-08 RX ADMIN — PREGABALIN 1000 MICROGRAM(S): 225 CAPSULE ORAL at 13:34

## 2019-12-08 RX ADMIN — ALBUTEROL 2 PUFF(S): 90 AEROSOL, METERED ORAL at 22:33

## 2019-12-08 RX ADMIN — OXYCODONE HYDROCHLORIDE 5 MILLIGRAM(S): 5 TABLET ORAL at 02:52

## 2019-12-08 RX ADMIN — SENNA PLUS 2 TABLET(S): 8.6 TABLET ORAL at 22:33

## 2019-12-08 RX ADMIN — ATORVASTATIN CALCIUM 20 MILLIGRAM(S): 80 TABLET, FILM COATED ORAL at 22:33

## 2019-12-08 RX ADMIN — SODIUM CHLORIDE 3 MILLILITER(S): 9 INJECTION INTRAMUSCULAR; INTRAVENOUS; SUBCUTANEOUS at 05:37

## 2019-12-08 RX ADMIN — POLYETHYLENE GLYCOL 3350 17 GRAM(S): 17 POWDER, FOR SOLUTION ORAL at 13:35

## 2019-12-08 RX ADMIN — HEPARIN SODIUM 5000 UNIT(S): 5000 INJECTION INTRAVENOUS; SUBCUTANEOUS at 05:39

## 2019-12-08 RX ADMIN — TIOTROPIUM BROMIDE 1 CAPSULE(S): 18 CAPSULE ORAL; RESPIRATORY (INHALATION) at 13:34

## 2019-12-08 RX ADMIN — FINASTERIDE 5 MILLIGRAM(S): 5 TABLET, FILM COATED ORAL at 13:34

## 2019-12-08 RX ADMIN — OXYCODONE HYDROCHLORIDE 5 MILLIGRAM(S): 5 TABLET ORAL at 03:37

## 2019-12-08 RX ADMIN — ALBUTEROL 2 PUFF(S): 90 AEROSOL, METERED ORAL at 13:33

## 2019-12-08 RX ADMIN — Medication 5 MILLIGRAM(S): at 05:39

## 2019-12-08 RX ADMIN — HEPARIN SODIUM 5000 UNIT(S): 5000 INJECTION INTRAVENOUS; SUBCUTANEOUS at 13:33

## 2019-12-08 RX ADMIN — Medication 1 TABLET(S): at 13:34

## 2019-12-08 RX ADMIN — ALBUTEROL 2 PUFF(S): 90 AEROSOL, METERED ORAL at 17:54

## 2019-12-08 NOTE — PROGRESS NOTE ADULT - ASSESSMENT
89 yo M admitted with gross hematuria likely 2/2 radiation cystitis, less likely due to hx low grade noninvasive bladder cancer (resected) transferred from OSH; was there 3 weeks;  had hyperbaric O2 treatment; went to OR; alum x2 CBI 11/26-11/27. 11/28 - NS CBI.; still bleeding, flushed yesterday for large clots; OR cancelled 12/4, needs cards w/u as per anesthesia, cardiology recs in chart, 12/5 pt underwent cysto, clot evac (300cc), fulguration, instillation of formalin, CBI light pink 12/6, weaned, remains light pink, no manual irrigations required 12/7    Plan:  - check color  - determine Lal plan  - f/u AM labs  - f/u medicine, cardiology  - IVL  - reg diet w/ ensure  - OOB with assistance  - rehab planning

## 2019-12-08 NOTE — PROGRESS NOTE ADULT - SUBJECTIVE AND OBJECTIVE BOX
Urology Daily Progress Note    SUBJECTIVE:  Pt seen and examined, and is resting comfortably in bed. Given 1U pRBC yesterday. No acute events overnight. Pt reported some blurry vision that has improved.  Daughter wonders if could be side effect of Ditropan.      OBJECTIVE:  Vital Signs Last 24 Hrs  T(C): 36.9 (08 Dec 2019 05:35), Max: 36.9 (07 Dec 2019 09:11)  T(F): 98.4 (08 Dec 2019 05:35), Max: 98.5 (07 Dec 2019 09:11)  HR: 73 (08 Dec 2019 05:35) (73 - 90)  BP: 114/41 (08 Dec 2019 05:35) (104/43 - 118/48)  BP(mean): --  RR: 18 (08 Dec 2019 05:35) (16 - 18)  SpO2: 97% (08 Dec 2019 05:35) (94% - 100%)    I&O's Detail    07 Dec 2019 07:01  -  08 Dec 2019 07:00  --------------------------------------------------------  IN:    Oral Fluid: 840 mL    Packed Red Blood Cells: 310 mL  Total IN: 1150 mL    OUT:  Total OUT: 0 mL    Total NET: 1150 mL        Exam:  GEN: A&Ox3, NAD  HEENT: atraumatic, normocephalic  RESP: no increased work of breathing, no use of accessory muscles  GI/ABD: soft, NT, ND  : urine faint pink on slow gtt CBI, clamped on rounds  EXTREMITIES: warm, pink, well-perfused                        8.3    7.64  )-----------( 245      ( 08 Dec 2019 05:56 )             25.3       12-08    136  |  107  |  33<H>  ----------------------------<  105<H>  4.2   |  22  |  1.25    Ca    8.0<L>      08 Dec 2019 05:56

## 2019-12-08 NOTE — PROGRESS NOTE ADULT - ASSESSMENT
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  Has been at [facility] since 10/31 with hematuria received 15 units PRBCs, gone to OR and had 15 hyperbaric oxygen treatments.  Per oncology pt has established diagnosis of low grade noninvasive bladder cancer from [facility] 10/18/19 with Dr. García Best.  Transferred for persistent hematuria, s/p trial of Alum x2, continues on CBI, for OR 12/5, now s/p  cysto, clot evac, fulguration and instillation of formalin.

## 2019-12-08 NOTE — PROGRESS NOTE ADULT - SUBJECTIVE AND OBJECTIVE BOX
PROGRESS NOTE:     Patient is a 89y old  Male who presents with a chief complaint of Hematuria (08 Dec 2019 07:29)      SUBJECTIVE / OVERNIGHT EVENTS:  Patient seen and examined this morning. Does not offer new complaints. s/p, Friday 12/5- cysto, clot evac, fulguration and instillation of formalin. 1 unit of PRBC administered yesterday.       ADDITIONAL REVIEW OF SYSTEMS:  Denies fevers, chills or shortness of breath    MEDICATIONS  (STANDING):  ALBUTerol    90 MICROgram(s) HFA Inhaler 2 Puff(s) Inhalation every 6 hours  atorvastatin 20 milliGRAM(s) Oral at bedtime  calcium carbonate   1250 mG (OsCal) 1 Tablet(s) Oral daily  cyanocobalamin 1000 MICROGram(s) Oral daily  epoetin gianfranco Injectable 30076 Unit(s) SubCutaneous <User Schedule>  ferrous    sulfate 325 milliGRAM(s) Oral daily  finasteride 5 milliGRAM(s) Oral daily  folic acid 1 milliGRAM(s) Oral daily  heparin  Injectable 5000 Unit(s) SubCutaneous every 8 hours  influenza   Vaccine 0.5 milliLiter(s) IntraMuscular once  NIFEdipine XL 30 milliGRAM(s) Oral daily  polyethylene glycol 3350 17 Gram(s) Oral daily  senna 2 Tablet(s) Oral at bedtime  sodium chloride 0.9% lock flush 3 milliLiter(s) IV Push every 8 hours  tiotropium 18 MICROgram(s) Capsule 1 Capsule(s) Inhalation daily    MEDICATIONS  (PRN):  belladonna 16.2 mG/opium 30 mg Suppository 1 Suppository(s) Rectal every 6 hours PRN Bladder spasms  ondansetron Injectable 4 milliGRAM(s) IV Push once PRN Nausea and/or Vomiting  oxyCODONE    IR 5 milliGRAM(s) Oral every 4 hours PRN Severe Pain (7 - 10)      CAPILLARY BLOOD GLUCOSE        I&O's Summary    07 Dec 2019 07:01  -  08 Dec 2019 07:00  --------------------------------------------------------  IN: 1150 mL / OUT: 0 mL / NET: 1150 mL        PHYSICAL EXAM:  Vital Signs Last 24 Hrs  T(C): 36.9 (08 Dec 2019 05:35), Max: 36.9 (07 Dec 2019 09:11)  T(F): 98.4 (08 Dec 2019 05:35), Max: 98.5 (07 Dec 2019 09:11)  HR: 73 (08 Dec 2019 05:35) (73 - 90)  BP: 114/41 (08 Dec 2019 05:35) (104/43 - 118/48)  BP(mean): --  RR: 18 (08 Dec 2019 05:35) (16 - 18)  SpO2: 97% (08 Dec 2019 05:35) (94% - 100%)    CONSTITUTIONAL: NAD, well-developed, elderly pleasant  RESPIRATORY: Normal respiratory effort; lungs are clear to auscultation bilaterally  CARDIOVASCULAR: Regular rate and rhythm, normal S1 and S2, +systolic murmur; No lower extremity edema; Peripheral pulses are 2+ bilaterally  ABDOMEN: Nontender to palpation, normoactive bowel sounds, no rebound/guarding; No hepatosplenomegaly  MUSCLOSKELETAL: no clubbing or cyanosis of digits; no joint swelling or tenderness to palpation, Upper extremity swelling. Mild dysarthria and expressive aphasia  PSYCH: A+O to person, place, and time; affect appropriate    LABS:                        8.3    7.64  )-----------( 245      ( 08 Dec 2019 05:56 )             25.3     12-08    136  |  107  |  33<H>  ----------------------------<  105<H>  4.2   |  22  |  1.25    Ca    8.0<L>      08 Dec 2019 05:56                  RADIOLOGY & ADDITIONAL TESTS:  Results Reviewed:   Imaging Personally Reviewed:  Electrocardiogram Personally Reviewed:    COORDINATION OF CARE:  Care Discussed with Consultants/Other Providers [Y/N]:  Prior or Outpatient Records Reviewed [Y/N]: PROGRESS NOTE:     Patient is a 89y old  Male who presents with a chief complaint of Hematuria (08 Dec 2019 07:29)      SUBJECTIVE / OVERNIGHT EVENTS:  Patient seen and examined this morning. Does not offer new complaints, states that yesterday he had an episode of blurry vision. s/p, Friday 12/5- cysto, clot evac, fulguration and instillation of formalin. 1 unit of PRBC administered yesterday.       ADDITIONAL REVIEW OF SYSTEMS:  Denies fevers, chills or shortness of breath    MEDICATIONS  (STANDING):  ALBUTerol    90 MICROgram(s) HFA Inhaler 2 Puff(s) Inhalation every 6 hours  atorvastatin 20 milliGRAM(s) Oral at bedtime  calcium carbonate   1250 mG (OsCal) 1 Tablet(s) Oral daily  cyanocobalamin 1000 MICROGram(s) Oral daily  epoetin gianfranco Injectable 21174 Unit(s) SubCutaneous <User Schedule>  ferrous    sulfate 325 milliGRAM(s) Oral daily  finasteride 5 milliGRAM(s) Oral daily  folic acid 1 milliGRAM(s) Oral daily  heparin  Injectable 5000 Unit(s) SubCutaneous every 8 hours  influenza   Vaccine 0.5 milliLiter(s) IntraMuscular once  NIFEdipine XL 30 milliGRAM(s) Oral daily  polyethylene glycol 3350 17 Gram(s) Oral daily  senna 2 Tablet(s) Oral at bedtime  sodium chloride 0.9% lock flush 3 milliLiter(s) IV Push every 8 hours  tiotropium 18 MICROgram(s) Capsule 1 Capsule(s) Inhalation daily    MEDICATIONS  (PRN):  belladonna 16.2 mG/opium 30 mg Suppository 1 Suppository(s) Rectal every 6 hours PRN Bladder spasms  ondansetron Injectable 4 milliGRAM(s) IV Push once PRN Nausea and/or Vomiting  oxyCODONE    IR 5 milliGRAM(s) Oral every 4 hours PRN Severe Pain (7 - 10)      CAPILLARY BLOOD GLUCOSE        I&O's Summary    07 Dec 2019 07:01  -  08 Dec 2019 07:00  --------------------------------------------------------  IN: 1150 mL / OUT: 0 mL / NET: 1150 mL        PHYSICAL EXAM:  Vital Signs Last 24 Hrs  T(C): 36.9 (08 Dec 2019 05:35), Max: 36.9 (07 Dec 2019 09:11)  T(F): 98.4 (08 Dec 2019 05:35), Max: 98.5 (07 Dec 2019 09:11)  HR: 73 (08 Dec 2019 05:35) (73 - 90)  BP: 114/41 (08 Dec 2019 05:35) (104/43 - 118/48)  BP(mean): --  RR: 18 (08 Dec 2019 05:35) (16 - 18)  SpO2: 97% (08 Dec 2019 05:35) (94% - 100%)    CONSTITUTIONAL: NAD, well-developed, elderly pleasant  RESPIRATORY: Normal respiratory effort; lungs are clear to auscultation bilaterally  CARDIOVASCULAR: Regular rate and rhythm, normal S1 and S2, +systolic murmur; No lower extremity edema; Peripheral pulses are 2+ bilaterally  ABDOMEN: Nontender to palpation, normoactive bowel sounds, no rebound/guarding; No hepatosplenomegaly  MUSCLOSKELETAL: no clubbing or cyanosis of digits; no joint swelling or tenderness to palpation, Upper extremity swelling. Mild dysarthria and expressive aphasia  PSYCH: A+O to person, place, and time; affect appropriate    LABS:                        8.3    7.64  )-----------( 245      ( 08 Dec 2019 05:56 )             25.3     12-08    136  |  107  |  33<H>  ----------------------------<  105<H>  4.2   |  22  |  1.25    Ca    8.0<L>      08 Dec 2019 05:56                  RADIOLOGY & ADDITIONAL TESTS:  Results Reviewed:   Imaging Personally Reviewed:  Electrocardiogram Personally Reviewed:    COORDINATION OF CARE:  Care Discussed with Consultants/Other Providers [Y/N]:  Prior or Outpatient Records Reviewed [Y/N]:

## 2019-12-08 NOTE — PROGRESS NOTE ADULT - SUBJECTIVE AND OBJECTIVE BOX
Subjective: Patient seen and examined. No new events except as noted.     SUBJECTIVE/ROS:        MEDICATIONS:  MEDICATIONS  (STANDING):  ALBUTerol    90 MICROgram(s) HFA Inhaler 2 Puff(s) Inhalation every 6 hours  atorvastatin 20 milliGRAM(s) Oral at bedtime  calcium carbonate   1250 mG (OsCal) 1 Tablet(s) Oral daily  cyanocobalamin 1000 MICROGram(s) Oral daily  epoetin gianfranco Injectable 22486 Unit(s) SubCutaneous <User Schedule>  ferrous    sulfate 325 milliGRAM(s) Oral daily  finasteride 5 milliGRAM(s) Oral daily  folic acid 1 milliGRAM(s) Oral daily  heparin  Injectable 5000 Unit(s) SubCutaneous every 8 hours  influenza   Vaccine 0.5 milliLiter(s) IntraMuscular once  NIFEdipine XL 30 milliGRAM(s) Oral daily  oxybutynin 5 milliGRAM(s) Oral two times a day  polyethylene glycol 3350 17 Gram(s) Oral daily  senna 2 Tablet(s) Oral at bedtime  sodium chloride 0.9% lock flush 3 milliLiter(s) IV Push every 8 hours  tiotropium 18 MICROgram(s) Capsule 1 Capsule(s) Inhalation daily      PHYSICAL EXAM:  T(C): 36.9 (12-08-19 @ 05:35), Max: 36.9 (12-07-19 @ 09:11)  HR: 73 (12-08-19 @ 05:35) (73 - 90)  BP: 114/41 (12-08-19 @ 05:35) (104/43 - 118/48)  RR: 18 (12-08-19 @ 05:35) (16 - 18)  SpO2: 97% (12-08-19 @ 05:35) (94% - 100%)  Wt(kg): --  I&O's Summary    07 Dec 2019 07:01  -  08 Dec 2019 06:48  --------------------------------------------------------  IN: 1150 mL / OUT: 0 mL / NET: 1150 mL            JVP: Normal  Neck: supple  Lung: clear   CV: S1 S2 , Murmur:  Abd: soft  Ext: No edema  neuro: Awake / alert  Psych: flat affect  Skin: normal``    LABS/DATA:    CARDIAC MARKERS:                                8.3    7.64  )-----------( 245      ( 08 Dec 2019 05:56 )             25.3     12-08    136  |  107  |  33<H>  ----------------------------<  105<H>  4.2   |  22  |  1.25    Ca    8.0<L>      08 Dec 2019 05:56      proBNP:   Lipid Profile:   HgA1c:   TSH:     TELE:  EKG:

## 2019-12-08 NOTE — PROGRESS NOTE ADULT - PROBLEM SELECTOR PLAN 1
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 Another unit transfused yesterday, hb today 8.3.

## 2019-12-09 LAB
ANION GAP SERPL CALC-SCNC: 6 MMO/L — LOW (ref 7–14)
BUN SERPL-MCNC: 25 MG/DL — HIGH (ref 7–23)
CALCIUM SERPL-MCNC: 8 MG/DL — LOW (ref 8.4–10.5)
CHLORIDE SERPL-SCNC: 106 MMOL/L — SIGNIFICANT CHANGE UP (ref 98–107)
CO2 SERPL-SCNC: 22 MMOL/L — SIGNIFICANT CHANGE UP (ref 22–31)
CREAT SERPL-MCNC: 1.15 MG/DL — SIGNIFICANT CHANGE UP (ref 0.5–1.3)
GLUCOSE SERPL-MCNC: 126 MG/DL — HIGH (ref 70–99)
HCT VFR BLD CALC: 28 % — LOW (ref 39–50)
HGB BLD-MCNC: 9 G/DL — LOW (ref 13–17)
MCHC RBC-ENTMCNC: 28.4 PG — SIGNIFICANT CHANGE UP (ref 27–34)
MCHC RBC-ENTMCNC: 32.1 % — SIGNIFICANT CHANGE UP (ref 32–36)
MCV RBC AUTO: 88.3 FL — SIGNIFICANT CHANGE UP (ref 80–100)
NRBC # FLD: 0 K/UL — SIGNIFICANT CHANGE UP (ref 0–0)
PLATELET # BLD AUTO: 245 K/UL — SIGNIFICANT CHANGE UP (ref 150–400)
PMV BLD: 8.5 FL — SIGNIFICANT CHANGE UP (ref 7–13)
POTASSIUM SERPL-MCNC: 3.6 MMOL/L — SIGNIFICANT CHANGE UP (ref 3.5–5.3)
POTASSIUM SERPL-SCNC: 3.6 MMOL/L — SIGNIFICANT CHANGE UP (ref 3.5–5.3)
RBC # BLD: 3.17 M/UL — LOW (ref 4.2–5.8)
RBC # FLD: 16.3 % — HIGH (ref 10.3–14.5)
SODIUM SERPL-SCNC: 134 MMOL/L — LOW (ref 135–145)
WBC # BLD: 5.91 K/UL — SIGNIFICANT CHANGE UP (ref 3.8–10.5)
WBC # FLD AUTO: 5.91 K/UL — SIGNIFICANT CHANGE UP (ref 3.8–10.5)

## 2019-12-09 PROCEDURE — 99231 SBSQ HOSP IP/OBS SF/LOW 25: CPT

## 2019-12-09 PROCEDURE — 99233 SBSQ HOSP IP/OBS HIGH 50: CPT

## 2019-12-09 RX ORDER — MINERAL OIL
30 OIL (ML) MISCELLANEOUS ONCE
Refills: 0 | Status: COMPLETED | OUTPATIENT
Start: 2019-12-09 | End: 2019-12-09

## 2019-12-09 RX ORDER — ALUMINUM SULFATE
1 POWDER (GRAM) MISCELLANEOUS ONCE
Refills: 0 | Status: COMPLETED | OUTPATIENT
Start: 2019-12-09 | End: 2019-12-09

## 2019-12-09 RX ADMIN — SODIUM CHLORIDE 3 MILLILITER(S): 9 INJECTION INTRAMUSCULAR; INTRAVENOUS; SUBCUTANEOUS at 13:54

## 2019-12-09 RX ADMIN — FINASTERIDE 5 MILLIGRAM(S): 5 TABLET, FILM COATED ORAL at 12:45

## 2019-12-09 RX ADMIN — ALBUTEROL 2 PUFF(S): 90 AEROSOL, METERED ORAL at 22:07

## 2019-12-09 RX ADMIN — SODIUM CHLORIDE 3 MILLILITER(S): 9 INJECTION INTRAMUSCULAR; INTRAVENOUS; SUBCUTANEOUS at 22:12

## 2019-12-09 RX ADMIN — ALBUTEROL 2 PUFF(S): 90 AEROSOL, METERED ORAL at 15:50

## 2019-12-09 RX ADMIN — TIOTROPIUM BROMIDE 1 CAPSULE(S): 18 CAPSULE ORAL; RESPIRATORY (INHALATION) at 10:43

## 2019-12-09 RX ADMIN — ATORVASTATIN CALCIUM 20 MILLIGRAM(S): 80 TABLET, FILM COATED ORAL at 22:11

## 2019-12-09 RX ADMIN — HEPARIN SODIUM 5000 UNIT(S): 5000 INJECTION INTRAVENOUS; SUBCUTANEOUS at 05:16

## 2019-12-09 RX ADMIN — Medication 1 TABLET(S): at 12:45

## 2019-12-09 RX ADMIN — ALBUTEROL 2 PUFF(S): 90 AEROSOL, METERED ORAL at 05:15

## 2019-12-09 RX ADMIN — Medication 1 APPLICATION(S): at 14:49

## 2019-12-09 RX ADMIN — HEPARIN SODIUM 5000 UNIT(S): 5000 INJECTION INTRAVENOUS; SUBCUTANEOUS at 22:12

## 2019-12-09 RX ADMIN — PREGABALIN 1000 MICROGRAM(S): 225 CAPSULE ORAL at 12:45

## 2019-12-09 RX ADMIN — POLYETHYLENE GLYCOL 3350 17 GRAM(S): 17 POWDER, FOR SOLUTION ORAL at 10:43

## 2019-12-09 RX ADMIN — SODIUM CHLORIDE 3 MILLILITER(S): 9 INJECTION INTRAMUSCULAR; INTRAVENOUS; SUBCUTANEOUS at 08:35

## 2019-12-09 RX ADMIN — Medication 325 MILLIGRAM(S): at 12:45

## 2019-12-09 RX ADMIN — Medication 1 MILLIGRAM(S): at 12:45

## 2019-12-09 RX ADMIN — HEPARIN SODIUM 5000 UNIT(S): 5000 INJECTION INTRAVENOUS; SUBCUTANEOUS at 14:48

## 2019-12-09 RX ADMIN — ATROPA BELLADONNA AND OPIUM 1 SUPPOSITORY(S): 16.2; 6 SUPPOSITORY RECTAL at 00:55

## 2019-12-09 RX ADMIN — Medication 30 MILLIGRAM(S): at 05:16

## 2019-12-09 RX ADMIN — ALBUTEROL 2 PUFF(S): 90 AEROSOL, METERED ORAL at 10:43

## 2019-12-09 RX ADMIN — ERYTHROPOIETIN 10000 UNIT(S): 10000 INJECTION, SOLUTION INTRAVENOUS; SUBCUTANEOUS at 11:05

## 2019-12-09 RX ADMIN — SENNA PLUS 2 TABLET(S): 8.6 TABLET ORAL at 22:11

## 2019-12-09 NOTE — PROGRESS NOTE ADULT - PROBLEM SELECTOR PLAN 2
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 And on 12/5 cysto, clot evac, fulguration and instillation of formalin. 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 And on 12/5 cysto, clot evac, fulguration and instillation of formalin.  - urology restarted alum, f/u recs

## 2019-12-09 NOTE — PROGRESS NOTE ADULT - SUBJECTIVE AND OBJECTIVE BOX
[facility] Division of Hospital Medicine  Yahir Cueva MD  Pager #[phone]    PROGRESS NOTE:     Patient is a 89y old  Male who presents with a chief complaint of Hematuria (08 Dec 2019 07:29)    SUBJECTIVE / OVERNIGHT EVENTS:  Patient seen and examined this morning. Son at bedside. Patient expressing he wishes to have a bowel movement, denies abdominal pain, denies any other pain or discomfort.     MEDICATIONS  (STANDING):  ALBUTerol    90 MICROgram(s) HFA Inhaler 2 Puff(s) Inhalation every 6 hours  aluminum sulfate 1% Irrigation in sterile water 1 Application(s) Topical once  atorvastatin 20 milliGRAM(s) Oral at bedtime  calcium carbonate   1250 mG (OsCal) 1 Tablet(s) Oral daily  cyanocobalamin 1000 MICROGram(s) Oral daily  epoetin gianfranco Injectable 77345 Unit(s) SubCutaneous <User Schedule>  ferrous    sulfate 325 milliGRAM(s) Oral daily  finasteride 5 milliGRAM(s) Oral daily  folic acid 1 milliGRAM(s) Oral daily  heparin  Injectable 5000 Unit(s) SubCutaneous every 8 hours  influenza   Vaccine 0.5 milliLiter(s) IntraMuscular once  mineral oil 30 milliLiter(s) Oral once  NIFEdipine XL 30 milliGRAM(s) Oral daily  polyethylene glycol 3350 17 Gram(s) Oral daily  senna 2 Tablet(s) Oral at bedtime  sodium chloride 0.9% lock flush 3 milliLiter(s) IV Push every 8 hours  tiotropium 18 MICROgram(s) Capsule 1 Capsule(s) Inhalation daily    MEDICATIONS  (PRN):  belladonna 16.2 mG/opium 30 mg Suppository 1 Suppository(s) Rectal every 6 hours PRN Bladder spasms  ondansetron Injectable 4 milliGRAM(s) IV Push once PRN Nausea and/or Vomiting  oxyCODONE    IR 5 milliGRAM(s) Oral every 4 hours PRN Severe Pain (7 - 10)    Vital Signs Last 24 Hrs  T(C): 36.7 (09 Dec 2019 09:37), Max: 37.1 (08 Dec 2019 17:44)  T(F): 98 (09 Dec 2019 09:37), Max: 98.7 (08 Dec 2019 17:44)  HR: 98 (09 Dec 2019 09:37) (76 - 98)  BP: 124/49 (09 Dec 2019 09:37) (113/48 - 140/57)  BP(mean): --  RR: 17 (09 Dec 2019 09:37) (15 - 18)  SpO2: 98% (09 Dec 2019 09:37) (96% - 100%)    I&O's Detail    08 Dec 2019 07:01  -  09 Dec 2019 07:00  --------------------------------------------------------  IN:    Oral Fluid: 960 mL  Total IN: 960 mL    OUT:    Indwelling Catheter - Urethral: 150 mL  Total OUT: 150 mL    Total NET: 810 mL    CAPILLARY BLOOD GLUCOSE    CONSTITUTIONAL: NAD, well-developed, elderly male  RESPIRATORY: Normal respiratory effort; lungs are clear to auscultation bilaterally  CARDIOVASCULAR: Regular rate and rhythm, normal S1 and S2, +systolic murmur; No lower extremity edema; Peripheral pulses are 2+ bilaterally  ABDOMEN: Nontender to palpation, normoactive bowel sounds, no rebound/guarding; No hepatosplenomegaly  MUSCLOSKELETAL: no clubbing or cyanosis of digits; no joint swelling or tenderness to palpation  PSYCH: A+O to person, place, and time; affect appropriate  NEURO: Expressive aphasia, comprehension appears intact    LABS:                         9.0    5.91  )-----------( 245      ( 09 Dec 2019 06:02 )             28.0     12-09    134<L>  |  106  |  25<H>  ----------------------------<  126<H>  3.6   |  22  |  1.15    Ca    8.0<L>      09 Dec 2019 06:02      RADIOLOGY, EKG & ADDITIONAL TESTS: Reviewed.

## 2019-12-09 NOTE — PROGRESS NOTE ADULT - ASSESSMENT
CAD history of cabg, stent   on statin   off asa due to anemia and hematuria   resume in future once deemed safe     murmur  mild to mod MR, mild to mod AS  stable     HTN  cont nifedipine

## 2019-12-09 NOTE — PROGRESS NOTE ADULT - ASSESSMENT
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  Has been at [facility] since 10/31 with hematuria received 15 units PRBCs, gone to OR and had 15 hyperbaric oxygen treatments.  Per oncology pt has established diagnosis of low grade noninvasive bladder cancer from [facility] 10/18/19 with Dr. García Best.  Transferred for persistent hematuria, s/p trial of Alum x2, continues on CBI, for OR 12/5, now s/p  cysto, clot evac, fulguration and instillation of formalin.

## 2019-12-09 NOTE — PROGRESS NOTE ADULT - SUBJECTIVE AND OBJECTIVE BOX
Subjective: Patient seen and examined. No new events except as noted.     SUBJECTIVE/ROS:  no cp or sob       MEDICATIONS:  MEDICATIONS  (STANDING):  ALBUTerol    90 MICROgram(s) HFA Inhaler 2 Puff(s) Inhalation every 6 hours  aluminum sulfate 1% Irrigation in sterile water 1 Application(s) Topical once  atorvastatin 20 milliGRAM(s) Oral at bedtime  calcium carbonate   1250 mG (OsCal) 1 Tablet(s) Oral daily  cyanocobalamin 1000 MICROGram(s) Oral daily  epoetin gianfranco Injectable 98053 Unit(s) SubCutaneous <User Schedule>  ferrous    sulfate 325 milliGRAM(s) Oral daily  finasteride 5 milliGRAM(s) Oral daily  folic acid 1 milliGRAM(s) Oral daily  heparin  Injectable 5000 Unit(s) SubCutaneous every 8 hours  influenza   Vaccine 0.5 milliLiter(s) IntraMuscular once  NIFEdipine XL 30 milliGRAM(s) Oral daily  polyethylene glycol 3350 17 Gram(s) Oral daily  senna 2 Tablet(s) Oral at bedtime  sodium chloride 0.9% lock flush 3 milliLiter(s) IV Push every 8 hours  tiotropium 18 MICROgram(s) Capsule 1 Capsule(s) Inhalation daily      PHYSICAL EXAM:  T(C): 36.7 (12-09-19 @ 09:37), Max: 37.1 (12-08-19 @ 17:44)  HR: 98 (12-09-19 @ 09:37) (76 - 98)  BP: 124/49 (12-09-19 @ 09:37) (113/48 - 140/57)  RR: 17 (12-09-19 @ 09:37) (15 - 18)  SpO2: 98% (12-09-19 @ 09:37) (96% - 100%)  Wt(kg): --  I&O's Summary    08 Dec 2019 07:01  -  09 Dec 2019 07:00  --------------------------------------------------------  IN: 960 mL / OUT: 150 mL / NET: 810 mL            JVP: Normal  Neck: supple  Lung: clear   CV: S1 S2 , Murmur:  Abd: soft  Ext: No edema  neuro: Awake / alert  Psych: flat affect  Skin: normal``    LABS/DATA:    CARDIAC MARKERS:                                9.0    5.91  )-----------( 245      ( 09 Dec 2019 06:02 )             28.0     12-09    134<L>  |  106  |  25<H>  ----------------------------<  126<H>  3.6   |  22  |  1.15    Ca    8.0<L>      09 Dec 2019 06:02      proBNP:   Lipid Profile:   HgA1c:   TSH:     TELE:  EKG:

## 2019-12-09 NOTE — PROGRESS NOTE ADULT - PROBLEM SELECTOR PLAN 1
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 1 unit transfused on 12/7/19, hg stable since

## 2019-12-09 NOTE — PROGRESS NOTE ADULT - ASSESSMENT
89 yo M admitted with gross hematuria likely 2/2 radiation cystitis, less likely due to hx low grade noninvasive bladder cancer (resected) transferred from OSH; was there 3 weeks;  had hyperbaric O2 treatment; went to OR; alum x2 CBI 11/26-11/27. 11/28 - NS CBI.; still bleeding, flushed yesterday for large clots; OR cancelled 12/4, needs cards w/u as per anesthesia, cardiology recs in chart, 12/5 pt underwent cysto, clot evac (300cc), fulguration, instillation of formalin, CBI light pink 12/6, weaned, remains light pink, no manual irrigations required 12/7, clamped 12/9, however became hematuric again, required manual irrigation for approx 200cc clot, CBI restarted on moderate drip    Plan:  - wean CBI as tolerated, monitor check   - AM labs reviewed  - beach plan TBD  - f/u medicine, cardiology  - IVL  - reg diet w/ ensure  - OOB with assistance  - rehab planning

## 2019-12-09 NOTE — PROGRESS NOTE ADULT - SUBJECTIVE AND OBJECTIVE BOX
Overnight events:  None, CBI clamped at 5am    Subjective:      Objective:    Vital signs  T(C): , Max: 37.1 (12-08-19 @ 17:44)  HR: 95 (12-09-19 @ 05:12)  BP: 140/57 (12-09-19 @ 05:12)  SpO2: 96% (12-09-19 @ 05:12)  Wt(kg): --    Output     12-08 @ 07:01  -  12-09 @ 07:00  --------------------------------------------------------  IN: 960 mL / OUT: 150 mL / NET: 810 mL        Gen  Abd      Labs                        9.0    5.91  )-----------( 245      ( 09 Dec 2019 06:02 )             28.0     09 Dec 2019 06:02    134    |  106    |  25     ----------------------------<  126    3.6     |  22     |  1.15     Ca    8.0        09 Dec 2019 06:02        Urine Cx:   Blood Cx:     Imaging Overnight events:  None, CBI clamped at 5am, punch again    Subjective:  Pt offers no complaints, + flatus per nurse    Objective:    Vital signs  T(C): , Max: 37.1 (12-08-19 @ 17:44)  HR: 95 (12-09-19 @ 05:12)  BP: 140/57 (12-09-19 @ 05:12)  SpO2: 96% (12-09-19 @ 05:12)  Wt(kg): --    Output   Lal: red/punch  12-08 @ 07:01  -  12-09 @ 07:00  --------------------------------------------------------  IN: 960 mL / OUT: 150 mL / NET: 810 mL        Gen: NAD  Abd: soft, nontender, no suprapubic tenderness or fullness  : yong secured    Labs                        9.0    5.91  )-----------( 245      ( 09 Dec 2019 06:02 )             28.0     09 Dec 2019 06:02    134    |  106    |  25     ----------------------------<  126    3.6     |  22     |  1.15     Ca    8.0        09 Dec 2019 06:02

## 2019-12-09 NOTE — CHART NOTE - NSCHARTNOTEFT_GEN_A_CORE
Beach irrigated w/ 1.5L sterile water, very few clots returned, irrigant to light pink.  Tubing and beach bags changed prior to start of alum irrigation.  Alum started at 100cc/hr, remains light pink on this slow drip.  Will continue to monitor.

## 2019-12-10 LAB
ANION GAP SERPL CALC-SCNC: 9 MMO/L — SIGNIFICANT CHANGE UP (ref 7–14)
BUN SERPL-MCNC: 19 MG/DL — SIGNIFICANT CHANGE UP (ref 7–23)
CALCIUM SERPL-MCNC: 7.8 MG/DL — LOW (ref 8.4–10.5)
CHLORIDE SERPL-SCNC: 104 MMOL/L — SIGNIFICANT CHANGE UP (ref 98–107)
CO2 SERPL-SCNC: 22 MMOL/L — SIGNIFICANT CHANGE UP (ref 22–31)
CREAT SERPL-MCNC: 1.14 MG/DL — SIGNIFICANT CHANGE UP (ref 0.5–1.3)
GLUCOSE SERPL-MCNC: 103 MG/DL — HIGH (ref 70–99)
HCT VFR BLD CALC: 27.5 % — LOW (ref 39–50)
HGB BLD-MCNC: 8.4 G/DL — LOW (ref 13–17)
MCHC RBC-ENTMCNC: 27.4 PG — SIGNIFICANT CHANGE UP (ref 27–34)
MCHC RBC-ENTMCNC: 30.5 % — LOW (ref 32–36)
MCV RBC AUTO: 89.6 FL — SIGNIFICANT CHANGE UP (ref 80–100)
NRBC # FLD: 0 K/UL — SIGNIFICANT CHANGE UP (ref 0–0)
PLATELET # BLD AUTO: 295 K/UL — SIGNIFICANT CHANGE UP (ref 150–400)
PMV BLD: 8.8 FL — SIGNIFICANT CHANGE UP (ref 7–13)
POTASSIUM SERPL-MCNC: 3.7 MMOL/L — SIGNIFICANT CHANGE UP (ref 3.5–5.3)
POTASSIUM SERPL-SCNC: 3.7 MMOL/L — SIGNIFICANT CHANGE UP (ref 3.5–5.3)
RBC # BLD: 3.07 M/UL — LOW (ref 4.2–5.8)
RBC # FLD: 16 % — HIGH (ref 10.3–14.5)
SODIUM SERPL-SCNC: 135 MMOL/L — SIGNIFICANT CHANGE UP (ref 135–145)
WBC # BLD: 5.98 K/UL — SIGNIFICANT CHANGE UP (ref 3.8–10.5)
WBC # FLD AUTO: 5.98 K/UL — SIGNIFICANT CHANGE UP (ref 3.8–10.5)

## 2019-12-10 PROCEDURE — 99231 SBSQ HOSP IP/OBS SF/LOW 25: CPT

## 2019-12-10 PROCEDURE — 99233 SBSQ HOSP IP/OBS HIGH 50: CPT

## 2019-12-10 RX ORDER — ALUMINUM SULFATE
1 POWDER (GRAM) MISCELLANEOUS ONCE
Refills: 0 | Status: COMPLETED | OUTPATIENT
Start: 2019-12-10 | End: 2019-12-10

## 2019-12-10 RX ORDER — ACETAMINOPHEN 500 MG
1000 TABLET ORAL ONCE
Refills: 0 | Status: COMPLETED | OUTPATIENT
Start: 2019-12-10 | End: 2019-12-10

## 2019-12-10 RX ORDER — ACETAMINOPHEN 500 MG
1000 TABLET ORAL ONCE
Refills: 0 | Status: COMPLETED | OUTPATIENT
Start: 2019-12-11 | End: 2019-12-11

## 2019-12-10 RX ADMIN — HEPARIN SODIUM 5000 UNIT(S): 5000 INJECTION INTRAVENOUS; SUBCUTANEOUS at 21:57

## 2019-12-10 RX ADMIN — Medication 1000 MILLIGRAM(S): at 21:00

## 2019-12-10 RX ADMIN — ATORVASTATIN CALCIUM 20 MILLIGRAM(S): 80 TABLET, FILM COATED ORAL at 21:57

## 2019-12-10 RX ADMIN — SODIUM CHLORIDE 3 MILLILITER(S): 9 INJECTION INTRAMUSCULAR; INTRAVENOUS; SUBCUTANEOUS at 14:05

## 2019-12-10 RX ADMIN — Medication 1 MILLIGRAM(S): at 14:07

## 2019-12-10 RX ADMIN — Medication 1 TABLET(S): at 14:06

## 2019-12-10 RX ADMIN — Medication 325 MILLIGRAM(S): at 14:07

## 2019-12-10 RX ADMIN — ATROPA BELLADONNA AND OPIUM 1 SUPPOSITORY(S): 16.2; 6 SUPPOSITORY RECTAL at 00:25

## 2019-12-10 RX ADMIN — POLYETHYLENE GLYCOL 3350 17 GRAM(S): 17 POWDER, FOR SOLUTION ORAL at 14:07

## 2019-12-10 RX ADMIN — HEPARIN SODIUM 5000 UNIT(S): 5000 INJECTION INTRAVENOUS; SUBCUTANEOUS at 14:07

## 2019-12-10 RX ADMIN — ALBUTEROL 2 PUFF(S): 90 AEROSOL, METERED ORAL at 21:56

## 2019-12-10 RX ADMIN — SENNA PLUS 2 TABLET(S): 8.6 TABLET ORAL at 21:57

## 2019-12-10 RX ADMIN — OXYCODONE HYDROCHLORIDE 5 MILLIGRAM(S): 5 TABLET ORAL at 23:52

## 2019-12-10 RX ADMIN — Medication 30 MILLIGRAM(S): at 06:06

## 2019-12-10 RX ADMIN — TIOTROPIUM BROMIDE 1 CAPSULE(S): 18 CAPSULE ORAL; RESPIRATORY (INHALATION) at 10:35

## 2019-12-10 RX ADMIN — SODIUM CHLORIDE 3 MILLILITER(S): 9 INJECTION INTRAMUSCULAR; INTRAVENOUS; SUBCUTANEOUS at 21:29

## 2019-12-10 RX ADMIN — HEPARIN SODIUM 5000 UNIT(S): 5000 INJECTION INTRAVENOUS; SUBCUTANEOUS at 06:07

## 2019-12-10 RX ADMIN — FINASTERIDE 5 MILLIGRAM(S): 5 TABLET, FILM COATED ORAL at 14:07

## 2019-12-10 RX ADMIN — ATROPA BELLADONNA AND OPIUM 1 SUPPOSITORY(S): 16.2; 6 SUPPOSITORY RECTAL at 19:56

## 2019-12-10 RX ADMIN — SODIUM CHLORIDE 3 MILLILITER(S): 9 INJECTION INTRAMUSCULAR; INTRAVENOUS; SUBCUTANEOUS at 06:07

## 2019-12-10 RX ADMIN — ALBUTEROL 2 PUFF(S): 90 AEROSOL, METERED ORAL at 06:03

## 2019-12-10 RX ADMIN — ALBUTEROL 2 PUFF(S): 90 AEROSOL, METERED ORAL at 10:35

## 2019-12-10 RX ADMIN — Medication 1 APPLICATION(S): at 21:57

## 2019-12-10 RX ADMIN — PREGABALIN 1000 MICROGRAM(S): 225 CAPSULE ORAL at 14:07

## 2019-12-10 RX ADMIN — Medication 400 MILLIGRAM(S): at 20:43

## 2019-12-10 NOTE — PROGRESS NOTE ADULT - PROBLEM SELECTOR PLAN 1
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 1 unit transfused on 12/7/19, hg stable since

## 2019-12-10 NOTE — PROGRESS NOTE ADULT - ASSESSMENT
CAD history of cabg, stent   on statin   off asa due to anemia and hematuria   resume in future once deemed safe     murmur  mild to mod MR, mild to mod AS  stable     HTN  cont nifedipine     Anemia  Monitor hemoglobin, transfuse as needed.

## 2019-12-10 NOTE — PROGRESS NOTE ADULT - SUBJECTIVE AND OBJECTIVE BOX
more comfortable  no transfusion > 48 hours      ALBUTerol    90 MICROgram(s) HFA Inhaler 2 Puff(s) Inhalation every 6 hours  atorvastatin 20 milliGRAM(s) Oral at bedtime  belladonna 16.2 mG/opium 30 mg Suppository 1 Suppository(s) Rectal every 6 hours PRN  calcium carbonate   1250 mG (OsCal) 1 Tablet(s) Oral daily  cyanocobalamin 1000 MICROGram(s) Oral daily  epoetin gianfranco Injectable 77902 Unit(s) SubCutaneous <User Schedule>  ferrous    sulfate 325 milliGRAM(s) Oral daily  finasteride 5 milliGRAM(s) Oral daily  folic acid 1 milliGRAM(s) Oral daily  heparin  Injectable 5000 Unit(s) SubCutaneous every 8 hours  influenza   Vaccine 0.5 milliLiter(s) IntraMuscular once  NIFEdipine XL 30 milliGRAM(s) Oral daily  ondansetron Injectable 4 milliGRAM(s) IV Push once PRN  oxyCODONE    IR 5 milliGRAM(s) Oral every 4 hours PRN  polyethylene glycol 3350 17 Gram(s) Oral daily  senna 2 Tablet(s) Oral at bedtime  sodium chloride 0.9% lock flush 3 milliLiter(s) IV Push every 8 hours  tiotropium 18 MICROgram(s) Capsule 1 Capsule(s) Inhalation daily      No Known Allergies      ROS otherwise negative     T(C): 36.8 (12-10-19 @ 09:25), Max: 37.1 (12-09-19 @ 21:51)  HR: 74 (12-10-19 @ 09:25) (74 - 89)  BP: 122/48 (12-10-19 @ 09:25) (122/48 - 139/51)  RR: 17 (12-10-19 @ 09:25) (16 - 17)  SpO2: 96% (12-10-19 @ 09:25) (95% - 100%)  PHYSICAL EXAM  Gen:  laying in bed, nad  H:  anicteric, eomi  CV:  RRR, S1, S2  Lungs:  CTA b/l  Ab soft/nt/nd  Ext:  no edema  urine to beach drain with dark blood                          8.4    5.98  )-----------( 295      ( 10 Dec 2019 06:31 )             27.5                         9.0    5.91  )-----------( 245      ( 09 Dec 2019 06:02 )             28.0                         8.3    7.64  )-----------( 245      ( 08 Dec 2019 05:56 )             25.3   12-10    135  |  104  |  19  ----------------------------<  103<H>  3.7   |  22  |  1.14    Ca    7.8<L>      10 Dec 2019 06:31

## 2019-12-10 NOTE — PROGRESS NOTE ADULT - SUBJECTIVE AND OBJECTIVE BOX
[facility] Division of Hospital Medicine  Yahir Cueva MD  Pager #[phone]    PROGRESS NOTE:     Patient is a 89y old  Male who presents with a chief complaint of Hematuria (08 Dec 2019 07:29)    SUBJECTIVE / OVERNIGHT EVENTS:  Patient seen and examined this morning. Wife at bedside. Patient denying pain or discomfort.     MEDICATIONS  (STANDING):  ALBUTerol    90 MICROgram(s) HFA Inhaler 2 Puff(s) Inhalation every 6 hours  atorvastatin 20 milliGRAM(s) Oral at bedtime  calcium carbonate   1250 mG (OsCal) 1 Tablet(s) Oral daily  cyanocobalamin 1000 MICROGram(s) Oral daily  epoetin gianfranco Injectable 14989 Unit(s) SubCutaneous <User Schedule>  ferrous    sulfate 325 milliGRAM(s) Oral daily  finasteride 5 milliGRAM(s) Oral daily  folic acid 1 milliGRAM(s) Oral daily  heparin  Injectable 5000 Unit(s) SubCutaneous every 8 hours  influenza   Vaccine 0.5 milliLiter(s) IntraMuscular once  NIFEdipine XL 30 milliGRAM(s) Oral daily  polyethylene glycol 3350 17 Gram(s) Oral daily  senna 2 Tablet(s) Oral at bedtime  sodium chloride 0.9% lock flush 3 milliLiter(s) IV Push every 8 hours  tiotropium 18 MICROgram(s) Capsule 1 Capsule(s) Inhalation daily    MEDICATIONS  (PRN):  belladonna 16.2 mG/opium 30 mg Suppository 1 Suppository(s) Rectal every 6 hours PRN Bladder spasms  ondansetron Injectable 4 milliGRAM(s) IV Push once PRN Nausea and/or Vomiting  oxyCODONE    IR 5 milliGRAM(s) Oral every 4 hours PRN Severe Pain (7 - 10)    Vital Signs Last 24 Hrs  T(C): 36.8 (10 Dec 2019 09:25), Max: 37.1 (09 Dec 2019 21:51)  T(F): 98.3 (10 Dec 2019 09:25), Max: 98.8 (09 Dec 2019 21:51)  HR: 74 (10 Dec 2019 09:25) (74 - 89)  BP: 122/48 (10 Dec 2019 09:25) (122/48 - 139/51)  BP(mean): --  RR: 17 (10 Dec 2019 09:25) (16 - 17)  SpO2: 96% (10 Dec 2019 09:25) (95% - 100%)    I&O's Detail    09 Dec 2019 07:01  -  10 Dec 2019 07:00  --------------------------------------------------------  IN:    Oral Fluid: 95 mL  Total IN: 95 mL    OUT:    Indwelling Catheter - Urethral: 2450 mL  Total OUT: 2450 mL    Total NET: -2355 mL    CAPILLARY BLOOD GLUCOSE    CONSTITUTIONAL: NAD, well-developed, elderly male  RESPIRATORY: Normal respiratory effort; lungs are clear to auscultation bilaterally  CARDIOVASCULAR: Regular rate and rhythm, normal S1 and S2, +systolic murmur; No lower extremity edema; Peripheral pulses are 2+ bilaterally  ABDOMEN: Nontender to palpation, normoactive bowel sounds, no rebound/guarding; No hepatosplenomegaly  MUSCLOSKELETAL: no clubbing or cyanosis of digits; no joint swelling or tenderness to palpation  PSYCH: calm  NEURO: Expressive aphasia, comprehension appears intact    LABS:                         8.4    5.98  )-----------( 295      ( 10 Dec 2019 06:31 )             27.5     12-10    135  |  104  |  19  ----------------------------<  103<H>  3.7   |  22  |  1.14    Ca    7.8<L>      10 Dec 2019 06:31      RADIOLOGY, EKG & ADDITIONAL TESTS: Reviewed.

## 2019-12-10 NOTE — PROGRESS NOTE ADULT - ASSESSMENT
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  Has been at [facility] since 10/31 with hematuria received 15 units PRBCs, gone to OR and had 15 hyperbaric oxygen treatments.  Transferred here for further urologic management.  He was also noted to have low grade noninvasive UCC of bladder in 10/2018      -urology management appreciated for RT cystitis.   -12/5 pt underwent cysto, clot evac (300cc), fulguration, instillation of formalin, CBI light pink 12/6, weaned, remains light pink, no manual irrigations required 12/7, clamped 12/9, however became hematuric again, required manual irrigation for approx 200cc clot, Alum CBI restarted on moderate drip    Anemia  -c/w oral iron  -c/w b12 and folate  -on procrit  -transfuse to keep Hgb > 8 given cardiac history, overall transfusion requirements have declined    Daniel Perera MD  Hematology/Oncology  Cell:  [phone]  Office Phone: [phone]  Office Fax:  [phone] [address] [place] [address]

## 2019-12-10 NOTE — PROGRESS NOTE ADULT - PROBLEM SELECTOR PLAN 2
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 And on 12/5 cysto, clot evac, fulguration and instillation of formalin.  - urology restarted alum, management per uro

## 2019-12-10 NOTE — PROGRESS NOTE ADULT - ASSESSMENT
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  Has been at [facility] since 10/31 with hematuria received 15 units PRBCs, gone to OR and had 15 hyperbaric oxygen treatments.  Per oncology pt has established diagnosis of low grade noninvasive bladder cancer from [facility] 10/18/19 with Dr. García Best.  Transferred for persistent hematuria, s/p trial of Alum x2, continues on CBI, now s/p  cysto, clot evac, fulguration and instillation of formalin, restarted on alum 12/9.

## 2019-12-10 NOTE — PROGRESS NOTE ADULT - ASSESSMENT
89 yo M admitted with gross hematuria likely 2/2 radiation cystitis, less likely due to hx low grade noninvasive bladder cancer (resected) transferred from OSH; was there 3 weeks;  had hyperbaric O2 treatment; went to OR; alum x2 CBI 11/26-11/27. 11/28 - NS CBI.; still bleeding, flushed yesterday for large clots; OR cancelled 12/4, needs cards w/u as per anesthesia, cardiology recs in chart, 12/5 pt underwent cysto, clot evac (300cc), fulguration, instillation of formalin, CBI light pink 12/6, weaned, remains light pink, no manual irrigations required 12/7, clamped 12/9, however became hematuric again, required manual irrigation for approx 200cc clot, Alum CBI restarted on moderate drip    Plan:  - cont ALum CBI  monitor color, flush PRN  - AM labs reviewed  - f/u medicine, cardiology  - IVL  - reg diet w/ ensure  - OOB with assistance  - rehab planning

## 2019-12-11 LAB
ANION GAP SERPL CALC-SCNC: 11 MMO/L — SIGNIFICANT CHANGE UP (ref 7–14)
BLD GP AB SCN SERPL QL: NEGATIVE — SIGNIFICANT CHANGE UP
BUN SERPL-MCNC: 20 MG/DL — SIGNIFICANT CHANGE UP (ref 7–23)
CALCIUM SERPL-MCNC: 8 MG/DL — LOW (ref 8.4–10.5)
CHLORIDE SERPL-SCNC: 104 MMOL/L — SIGNIFICANT CHANGE UP (ref 98–107)
CO2 SERPL-SCNC: 20 MMOL/L — LOW (ref 22–31)
CREAT SERPL-MCNC: 1.22 MG/DL — SIGNIFICANT CHANGE UP (ref 0.5–1.3)
GLUCOSE SERPL-MCNC: 117 MG/DL — HIGH (ref 70–99)
HCT VFR BLD CALC: 29 % — LOW (ref 39–50)
HGB BLD-MCNC: 8.8 G/DL — LOW (ref 13–17)
MCHC RBC-ENTMCNC: 27.5 PG — SIGNIFICANT CHANGE UP (ref 27–34)
MCHC RBC-ENTMCNC: 30.3 % — LOW (ref 32–36)
MCV RBC AUTO: 90.6 FL — SIGNIFICANT CHANGE UP (ref 80–100)
NRBC # FLD: 0 K/UL — SIGNIFICANT CHANGE UP (ref 0–0)
PLATELET # BLD AUTO: 299 K/UL — SIGNIFICANT CHANGE UP (ref 150–400)
PMV BLD: 8.9 FL — SIGNIFICANT CHANGE UP (ref 7–13)
POTASSIUM SERPL-MCNC: 3.4 MMOL/L — LOW (ref 3.5–5.3)
POTASSIUM SERPL-SCNC: 3.4 MMOL/L — LOW (ref 3.5–5.3)
RBC # BLD: 3.2 M/UL — LOW (ref 4.2–5.8)
RBC # FLD: 15.9 % — HIGH (ref 10.3–14.5)
RH IG SCN BLD-IMP: POSITIVE — SIGNIFICANT CHANGE UP
SODIUM SERPL-SCNC: 135 MMOL/L — SIGNIFICANT CHANGE UP (ref 135–145)
WBC # BLD: 8.35 K/UL — SIGNIFICANT CHANGE UP (ref 3.8–10.5)
WBC # FLD AUTO: 8.35 K/UL — SIGNIFICANT CHANGE UP (ref 3.8–10.5)

## 2019-12-11 PROCEDURE — 74177 CT ABD & PELVIS W/CONTRAST: CPT | Mod: 26

## 2019-12-11 PROCEDURE — 99231 SBSQ HOSP IP/OBS SF/LOW 25: CPT

## 2019-12-11 PROCEDURE — 99233 SBSQ HOSP IP/OBS HIGH 50: CPT

## 2019-12-11 PROCEDURE — 93010 ELECTROCARDIOGRAM REPORT: CPT

## 2019-12-11 PROCEDURE — 51700 IRRIGATION OF BLADDER: CPT

## 2019-12-11 RX ORDER — SODIUM CHLORIDE 9 MG/ML
1000 INJECTION, SOLUTION INTRAVENOUS
Refills: 0 | Status: DISCONTINUED | OUTPATIENT
Start: 2019-12-11 | End: 2019-12-13

## 2019-12-11 RX ORDER — CEFTRIAXONE 500 MG/1
1000 INJECTION, POWDER, FOR SOLUTION INTRAMUSCULAR; INTRAVENOUS EVERY 24 HOURS
Refills: 0 | Status: COMPLETED | OUTPATIENT
Start: 2019-12-11 | End: 2019-12-11

## 2019-12-11 RX ORDER — POTASSIUM CHLORIDE 20 MEQ
40 PACKET (EA) ORAL ONCE
Refills: 0 | Status: COMPLETED | OUTPATIENT
Start: 2019-12-11 | End: 2019-12-11

## 2019-12-11 RX ORDER — HYDROMORPHONE HYDROCHLORIDE 2 MG/ML
0.5 INJECTION INTRAMUSCULAR; INTRAVENOUS; SUBCUTANEOUS ONCE
Refills: 0 | Status: DISCONTINUED | OUTPATIENT
Start: 2019-12-11 | End: 2019-12-11

## 2019-12-11 RX ORDER — LIDOCAINE HCL 20 MG/ML
20 VIAL (ML) INJECTION ONCE
Refills: 0 | Status: DISCONTINUED | OUTPATIENT
Start: 2019-12-11 | End: 2019-12-17

## 2019-12-11 RX ADMIN — Medication 40 MILLIEQUIVALENT(S): at 10:52

## 2019-12-11 RX ADMIN — SENNA PLUS 2 TABLET(S): 8.6 TABLET ORAL at 21:26

## 2019-12-11 RX ADMIN — OXYCODONE HYDROCHLORIDE 5 MILLIGRAM(S): 5 TABLET ORAL at 10:54

## 2019-12-11 RX ADMIN — Medication 325 MILLIGRAM(S): at 15:05

## 2019-12-11 RX ADMIN — HEPARIN SODIUM 5000 UNIT(S): 5000 INJECTION INTRAVENOUS; SUBCUTANEOUS at 15:05

## 2019-12-11 RX ADMIN — HEPARIN SODIUM 5000 UNIT(S): 5000 INJECTION INTRAVENOUS; SUBCUTANEOUS at 05:41

## 2019-12-11 RX ADMIN — SODIUM CHLORIDE 50 MILLILITER(S): 9 INJECTION, SOLUTION INTRAVENOUS at 10:55

## 2019-12-11 RX ADMIN — PREGABALIN 1000 MICROGRAM(S): 225 CAPSULE ORAL at 15:05

## 2019-12-11 RX ADMIN — ERYTHROPOIETIN 10000 UNIT(S): 10000 INJECTION, SOLUTION INTRAVENOUS; SUBCUTANEOUS at 10:42

## 2019-12-11 RX ADMIN — OXYCODONE HYDROCHLORIDE 5 MILLIGRAM(S): 5 TABLET ORAL at 00:51

## 2019-12-11 RX ADMIN — FINASTERIDE 5 MILLIGRAM(S): 5 TABLET, FILM COATED ORAL at 15:05

## 2019-12-11 RX ADMIN — ATORVASTATIN CALCIUM 20 MILLIGRAM(S): 80 TABLET, FILM COATED ORAL at 21:26

## 2019-12-11 RX ADMIN — ALBUTEROL 2 PUFF(S): 90 AEROSOL, METERED ORAL at 21:25

## 2019-12-11 RX ADMIN — SODIUM CHLORIDE 3 MILLILITER(S): 9 INJECTION INTRAMUSCULAR; INTRAVENOUS; SUBCUTANEOUS at 11:42

## 2019-12-11 RX ADMIN — HEPARIN SODIUM 5000 UNIT(S): 5000 INJECTION INTRAVENOUS; SUBCUTANEOUS at 21:25

## 2019-12-11 RX ADMIN — SODIUM CHLORIDE 3 MILLILITER(S): 9 INJECTION INTRAMUSCULAR; INTRAVENOUS; SUBCUTANEOUS at 22:17

## 2019-12-11 RX ADMIN — ALBUTEROL 2 PUFF(S): 90 AEROSOL, METERED ORAL at 05:41

## 2019-12-11 RX ADMIN — SODIUM CHLORIDE 3 MILLILITER(S): 9 INJECTION INTRAMUSCULAR; INTRAVENOUS; SUBCUTANEOUS at 05:42

## 2019-12-11 RX ADMIN — Medication 1 TABLET(S): at 15:05

## 2019-12-11 RX ADMIN — OXYCODONE HYDROCHLORIDE 5 MILLIGRAM(S): 5 TABLET ORAL at 08:59

## 2019-12-11 RX ADMIN — ATROPA BELLADONNA AND OPIUM 1 SUPPOSITORY(S): 16.2; 6 SUPPOSITORY RECTAL at 07:11

## 2019-12-11 RX ADMIN — ATROPA BELLADONNA AND OPIUM 1 SUPPOSITORY(S): 16.2; 6 SUPPOSITORY RECTAL at 07:41

## 2019-12-11 RX ADMIN — CEFTRIAXONE 100 MILLIGRAM(S): 500 INJECTION, POWDER, FOR SOLUTION INTRAMUSCULAR; INTRAVENOUS at 19:55

## 2019-12-11 RX ADMIN — Medication 30 MILLIGRAM(S): at 05:41

## 2019-12-11 RX ADMIN — HYDROMORPHONE HYDROCHLORIDE 0.5 MILLIGRAM(S): 2 INJECTION INTRAMUSCULAR; INTRAVENOUS; SUBCUTANEOUS at 08:40

## 2019-12-11 RX ADMIN — Medication 400 MILLIGRAM(S): at 02:27

## 2019-12-11 RX ADMIN — TIOTROPIUM BROMIDE 1 CAPSULE(S): 18 CAPSULE ORAL; RESPIRATORY (INHALATION) at 10:54

## 2019-12-11 RX ADMIN — Medication 1 MILLIGRAM(S): at 15:05

## 2019-12-11 RX ADMIN — ALBUTEROL 2 PUFF(S): 90 AEROSOL, METERED ORAL at 10:54

## 2019-12-11 NOTE — PROGRESS NOTE ADULT - ASSESSMENT
89 yo M admitted with gross hematuria likely 2/2 radiation cystitis, less likely due to hx low grade noninvasive bladder cancer (resected) transferred from OSH; was there 3 weeks;  had hyperbaric O2 treatment; went to OR; alum x2 CBI 11/26-11/27. 11/28 - NS CBI.; still bleeding, flushed yesterday for large clots; OR cancelled 12/4, needs cards w/u as per anesthesia, cardiology recs in chart, 12/5 pt underwent cysto, clot evac (300cc), fulguration, instillation of formalin, CBI light pink 12/6, weaned, remains light pink, no manual irrigations required 12/7, clamped 12/9, however became hematuric again, required manual irrigation for approx 200cc clot, Alum CBI restarted on moderate drip, this am alum CBI stopped draining, irrigated, will switch to NS CBI and obtain CT cystogram    Plan:  - d/c Alum CBI, restart NS CBI, monitor color, flush PRN  - CT cystogram  - AM labs reviewed  - f/u medicine, cardiology, hemeonc  - NPO, gentle IVF  - OOB with assistance  - rehab planning

## 2019-12-11 NOTE — PROGRESS NOTE ADULT - PROBLEM SELECTOR PLAN 2
bleeding presumed from radiation cystitis, low grade noninvasive bladder CA diagnosed recently  management per urology - s/p Alum. 12/5 cysto, clot evac, fulguration and instillation of formalin  - urology restarted alum, now stopped draining, CT uro ordered by urology, management per uro

## 2019-12-11 NOTE — PROGRESS NOTE ADULT - SUBJECTIVE AND OBJECTIVE BOX
Overnight events:  Alum CBI stopped infusing, clamped this AM    Subjective:  On rounds, pt had no complaints    Objective:    Vital signs  T(C): , Max: 37.1 (12-10-19 @ 21:26)  HR: 88 (12-11-19 @ 08:53)  BP: 129/67 (12-11-19 @ 08:53)  SpO2: 98% (12-11-19 @ 08:53)  Wt(kg): --    Output     12-10 @ 07:01  -  12-11 @ 07:00  --------------------------------------------------------  IN: 0 mL / OUT: 700 mL / NET: -700 mL    12-11 @ 07:01  -  12-11 @ 09:40  --------------------------------------------------------  IN: 0 mL / OUT: 2000 mL / NET: -2000 mL        Gen: NAD  Abd: soft, nontender  : yong in place    Labs                        8.8    8.35  )-----------( 299      ( 11 Dec 2019 07:00 )             29.0     11 Dec 2019 07:00    135    |  104    |  20     ----------------------------<  117    3.4     |  20     |  1.22     Ca    8.0        11 Dec 2019 07:00

## 2019-12-11 NOTE — PROGRESS NOTE ADULT - SUBJECTIVE AND OBJECTIVE BOX
Subjective: Patient seen and examined. No new events except as noted.     SUBJECTIVE/ROS:        MEDICATIONS:  MEDICATIONS  (STANDING):  ALBUTerol    90 MICROgram(s) HFA Inhaler 2 Puff(s) Inhalation every 6 hours  atorvastatin 20 milliGRAM(s) Oral at bedtime  calcium carbonate   1250 mG (OsCal) 1 Tablet(s) Oral daily  cyanocobalamin 1000 MICROGram(s) Oral daily  epoetin gianfranco Injectable 82938 Unit(s) SubCutaneous <User Schedule>  ferrous    sulfate 325 milliGRAM(s) Oral daily  finasteride 5 milliGRAM(s) Oral daily  folic acid 1 milliGRAM(s) Oral daily  heparin  Injectable 5000 Unit(s) SubCutaneous every 8 hours  influenza   Vaccine 0.5 milliLiter(s) IntraMuscular once  NIFEdipine XL 30 milliGRAM(s) Oral daily  polyethylene glycol 3350 17 Gram(s) Oral daily  senna 2 Tablet(s) Oral at bedtime  sodium chloride 0.9% lock flush 3 milliLiter(s) IV Push every 8 hours  tiotropium 18 MICROgram(s) Capsule 1 Capsule(s) Inhalation daily      PHYSICAL EXAM:  T(C): 36.4 (12-11-19 @ 05:38), Max: 37.1 (12-10-19 @ 21:26)  HR: 72 (12-11-19 @ 05:38) (72 - 99)  BP: 129/45 (12-11-19 @ 05:38) (117/54 - 132/59)  RR: 17 (12-11-19 @ 05:38) (16 - 17)  SpO2: 98% (12-11-19 @ 05:38) (96% - 98%)  Wt(kg): --  I&O's Summary    10 Dec 2019 07:01  -  11 Dec 2019 07:00  --------------------------------------------------------  IN: 0 mL / OUT: 700 mL / NET: -700 mL            JVP: Normal  Neck: supple  Lung: clear   CV: S1 S2 , Murmur:  Abd: soft  Ext: No edema  neuro: Awake / alert  Psych: flat affect  Skin: normal``    LABS/DATA:    CARDIAC MARKERS:                                8.4    5.98  )-----------( 295      ( 10 Dec 2019 06:31 )             27.5     12-10    135  |  104  |  19  ----------------------------<  103<H>  3.7   |  22  |  1.14    Ca    7.8<L>      10 Dec 2019 06:31      proBNP:   Lipid Profile:   HgA1c:   TSH:     TELE:  EKG:

## 2019-12-11 NOTE — PROGRESS NOTE ADULT - ASSESSMENT
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  Has been at [facility] since 10/31 with hematuria received 15 units PRBCs, gone to OR and had 15 hyperbaric oxygen treatments.  Per oncology pt has established diagnosis of low grade noninvasive bladder cancer from [facility] 10/18/19 with Dr. García Best.  Transferred for persistent hematuria, s/p trial of Alum now s/p  cysto, clot evac, fulguration and instillation of formalin, restarted on alum 12/9 now stopped draining overnight.

## 2019-12-11 NOTE — CHART NOTE - NSCHARTNOTEFT_GEN_A_CORE
Unable to irrigate 22 Fr beach, beach removed and 6 eye placed easily in normal fashion, with aspiration of 240cc of alum/blood/urine, irrigated with sterile water gently to light punch.  24 fr 3 way then inserted easily  in normal fashion, sterile water CBI restarted.  Alum d/c irrigated with sterile water 1.5liters, all tubing changed and normal saline CBI restarted on slow drip, very light punch.  Will continue to monitor.  CT cystogram ordered. Unable to irrigate 22 Fr beach; beach removed and 6 eye catheter placed easily in normal fashion, with aspiration of 240cc of alum/blood/urine, irrigated with sterile water gently to light punch.  24 fr 3 way then inserted easily  in normal fashion, sterile water CBI restarted.  Alum d/c irrigated with sterile water 1.5liters, all tubing changed and normal saline CBI restarted on slow drip, very light punch.  Abdomen soft, nontender.  Will continue to monitor.  CT cystogram ordered.

## 2019-12-11 NOTE — PROGRESS NOTE ADULT - SUBJECTIVE AND OBJECTIVE BOX
[facility] Division of Hospital Medicine  Yahir Cueva MD  Pager #[phone]    PROGRESS NOTE:     Patient is a 89y old  Male who presents with a chief complaint of Hematuria (08 Dec 2019 07:29)    SUBJECTIVE / OVERNIGHT EVENTS: Overnight alum cbi stopped draining, CT cystogram ordered per uro for further evaluation. Patient seen and examined, son at bedside. Patient not expressing any pain, complaints, or questions.    MEDICATIONS  (STANDING):  ALBUTerol    90 MICROgram(s) HFA Inhaler 2 Puff(s) Inhalation every 6 hours  atorvastatin 20 milliGRAM(s) Oral at bedtime  calcium carbonate   1250 mG (OsCal) 1 Tablet(s) Oral daily  cyanocobalamin 1000 MICROGram(s) Oral daily  dextrose 5% + sodium chloride 0.45%. 1000 milliLiter(s) (50 mL/Hr) IV Continuous <Continuous>  epoetin gianfranco Injectable 19523 Unit(s) SubCutaneous <User Schedule>  ferrous    sulfate 325 milliGRAM(s) Oral daily  finasteride 5 milliGRAM(s) Oral daily  folic acid 1 milliGRAM(s) Oral daily  heparin  Injectable 5000 Unit(s) SubCutaneous every 8 hours  influenza   Vaccine 0.5 milliLiter(s) IntraMuscular once  NIFEdipine XL 30 milliGRAM(s) Oral daily  polyethylene glycol 3350 17 Gram(s) Oral daily  potassium chloride    Tablet ER 40 milliEquivalent(s) Oral once  senna 2 Tablet(s) Oral at bedtime  sodium chloride 0.9% lock flush 3 milliLiter(s) IV Push every 8 hours  tiotropium 18 MICROgram(s) Capsule 1 Capsule(s) Inhalation daily    MEDICATIONS  (PRN):  belladonna 16.2 mG/opium 30 mg Suppository 1 Suppository(s) Rectal every 6 hours PRN Bladder spasms  ondansetron Injectable 4 milliGRAM(s) IV Push once PRN Nausea and/or Vomiting  oxyCODONE    IR 5 milliGRAM(s) Oral every 4 hours PRN Severe Pain (7 - 10)    Vital Signs Last 24 Hrs  T(C): 36.7 (11 Dec 2019 08:53), Max: 37.1 (10 Dec 2019 21:26)  T(F): 98 (11 Dec 2019 08:53), Max: 98.8 (10 Dec 2019 21:26)  HR: 88 (11 Dec 2019 08:53) (72 - 99)  BP: 129/67 (11 Dec 2019 08:53) (117/54 - 132/59)  BP(mean): --  RR: 16 (11 Dec 2019 08:53) (16 - 17)  SpO2: 98% (11 Dec 2019 08:53) (97% - 98%)    I&O's Detail    10 Dec 2019 07:01  -  11 Dec 2019 07:00  --------------------------------------------------------  IN:  Total IN: 0 mL    OUT:    Indwelling Catheter - Urethral: 700 mL  Total OUT: 700 mL    Total NET: -700 mL    11 Dec 2019 07:01  -  11 Dec 2019 10:12  --------------------------------------------------------  IN:  Total IN: 0 mL    OUT:    Continuous Bladder Irrigation: 2000 mL  Total OUT: 2000 mL    Total NET: -2000 mL    CAPILLARY BLOOD GLUCOSE    CONSTITUTIONAL: NAD, elderly male  RESPIRATORY: Normal respiratory effort; lungs are clear to auscultation bilaterally  CARDIOVASCULAR: Regular rate and rhythm, normal S1 and S2, +systolic murmur; No lower extremity edema; Peripheral pulses are 2+ bilaterally  ABDOMEN: Nontender to palpation, normoactive bowel sounds, no rebound/guarding; No hepatosplenomegaly  MUSCLOSKELETAL: no clubbing or cyanosis of digits; no joint swelling or tenderness to palpation  PSYCH: calm  NEURO: Expressive aphasia, comprehension appears intact    LABS:                         8.8    8.35  )-----------( 299      ( 11 Dec 2019 07:00 )             29.0     12-11    135  |  104  |  20  ----------------------------<  117<H>  3.4<L>   |  20<L>  |  1.22    Ca    8.0<L>      11 Dec 2019 07:00      RADIOLOGY, EKG & ADDITIONAL TESTS: Reviewed.

## 2019-12-11 NOTE — PROGRESS NOTE ADULT - PROBLEM SELECTOR PLAN 1
from bleeding presumed from radiation cystitis, low grade noninvasive bladder CA diagnosed recently   Hb stable 8.3-> 9.0 ->8.4 -> 8.8 today, will try to keep above 8 -(S/P transfusion of 23 units so far?)  - epo tiw. 1 unit transfused on 12/7/19, hg stable since

## 2019-12-12 LAB
ANION GAP SERPL CALC-SCNC: 12 MMO/L — SIGNIFICANT CHANGE UP (ref 7–14)
BUN SERPL-MCNC: 23 MG/DL — SIGNIFICANT CHANGE UP (ref 7–23)
CALCIUM SERPL-MCNC: 7.7 MG/DL — LOW (ref 8.4–10.5)
CHLORIDE SERPL-SCNC: 105 MMOL/L — SIGNIFICANT CHANGE UP (ref 98–107)
CO2 SERPL-SCNC: 18 MMOL/L — LOW (ref 22–31)
CREAT SERPL-MCNC: 1.32 MG/DL — HIGH (ref 0.5–1.3)
GLUCOSE SERPL-MCNC: 118 MG/DL — HIGH (ref 70–99)
HCT VFR BLD CALC: 27 % — LOW (ref 39–50)
HGB BLD-MCNC: 8.5 G/DL — LOW (ref 13–17)
MCHC RBC-ENTMCNC: 28.6 PG — SIGNIFICANT CHANGE UP (ref 27–34)
MCHC RBC-ENTMCNC: 31.5 % — LOW (ref 32–36)
MCV RBC AUTO: 90.9 FL — SIGNIFICANT CHANGE UP (ref 80–100)
NRBC # FLD: 0 K/UL — SIGNIFICANT CHANGE UP (ref 0–0)
PLATELET # BLD AUTO: 266 K/UL — SIGNIFICANT CHANGE UP (ref 150–400)
PMV BLD: 8.9 FL — SIGNIFICANT CHANGE UP (ref 7–13)
POTASSIUM SERPL-MCNC: 3.9 MMOL/L — SIGNIFICANT CHANGE UP (ref 3.5–5.3)
POTASSIUM SERPL-SCNC: 3.9 MMOL/L — SIGNIFICANT CHANGE UP (ref 3.5–5.3)
RBC # BLD: 2.97 M/UL — LOW (ref 4.2–5.8)
RBC # FLD: 16 % — HIGH (ref 10.3–14.5)
SODIUM SERPL-SCNC: 135 MMOL/L — SIGNIFICANT CHANGE UP (ref 135–145)
WBC # BLD: 13.24 K/UL — HIGH (ref 3.8–10.5)
WBC # FLD AUTO: 13.24 K/UL — HIGH (ref 3.8–10.5)

## 2019-12-12 PROCEDURE — 99233 SBSQ HOSP IP/OBS HIGH 50: CPT

## 2019-12-12 PROCEDURE — 51700 IRRIGATION OF BLADDER: CPT

## 2019-12-12 PROCEDURE — 99231 SBSQ HOSP IP/OBS SF/LOW 25: CPT

## 2019-12-12 RX ORDER — ALUMINUM SULFATE
1 POWDER (GRAM) MISCELLANEOUS ONCE
Refills: 0 | Status: COMPLETED | OUTPATIENT
Start: 2019-12-12 | End: 2019-12-12

## 2019-12-12 RX ORDER — HYDROMORPHONE HYDROCHLORIDE 2 MG/ML
0.5 INJECTION INTRAMUSCULAR; INTRAVENOUS; SUBCUTANEOUS ONCE
Refills: 0 | Status: DISCONTINUED | OUTPATIENT
Start: 2019-12-12 | End: 2019-12-12

## 2019-12-12 RX ADMIN — PREGABALIN 1000 MICROGRAM(S): 225 CAPSULE ORAL at 13:08

## 2019-12-12 RX ADMIN — Medication 1 TABLET(S): at 13:08

## 2019-12-12 RX ADMIN — Medication 1 APPLICATION(S): at 21:58

## 2019-12-12 RX ADMIN — POLYETHYLENE GLYCOL 3350 17 GRAM(S): 17 POWDER, FOR SOLUTION ORAL at 17:04

## 2019-12-12 RX ADMIN — HYDROMORPHONE HYDROCHLORIDE 0.5 MILLIGRAM(S): 2 INJECTION INTRAMUSCULAR; INTRAVENOUS; SUBCUTANEOUS at 11:39

## 2019-12-12 RX ADMIN — ALBUTEROL 2 PUFF(S): 90 AEROSOL, METERED ORAL at 22:00

## 2019-12-12 RX ADMIN — Medication 325 MILLIGRAM(S): at 13:07

## 2019-12-12 RX ADMIN — ATORVASTATIN CALCIUM 20 MILLIGRAM(S): 80 TABLET, FILM COATED ORAL at 21:48

## 2019-12-12 RX ADMIN — SODIUM CHLORIDE 3 MILLILITER(S): 9 INJECTION INTRAMUSCULAR; INTRAVENOUS; SUBCUTANEOUS at 17:04

## 2019-12-12 RX ADMIN — SODIUM CHLORIDE 3 MILLILITER(S): 9 INJECTION INTRAMUSCULAR; INTRAVENOUS; SUBCUTANEOUS at 21:46

## 2019-12-12 RX ADMIN — TIOTROPIUM BROMIDE 1 CAPSULE(S): 18 CAPSULE ORAL; RESPIRATORY (INHALATION) at 11:15

## 2019-12-12 RX ADMIN — ALBUTEROL 2 PUFF(S): 90 AEROSOL, METERED ORAL at 11:14

## 2019-12-12 RX ADMIN — HEPARIN SODIUM 5000 UNIT(S): 5000 INJECTION INTRAVENOUS; SUBCUTANEOUS at 05:34

## 2019-12-12 RX ADMIN — ALBUTEROL 2 PUFF(S): 90 AEROSOL, METERED ORAL at 05:32

## 2019-12-12 RX ADMIN — SENNA PLUS 2 TABLET(S): 8.6 TABLET ORAL at 21:48

## 2019-12-12 RX ADMIN — SODIUM CHLORIDE 3 MILLILITER(S): 9 INJECTION INTRAMUSCULAR; INTRAVENOUS; SUBCUTANEOUS at 05:38

## 2019-12-12 RX ADMIN — HEPARIN SODIUM 5000 UNIT(S): 5000 INJECTION INTRAVENOUS; SUBCUTANEOUS at 21:48

## 2019-12-12 RX ADMIN — Medication 1 MILLIGRAM(S): at 13:08

## 2019-12-12 RX ADMIN — FINASTERIDE 5 MILLIGRAM(S): 5 TABLET, FILM COATED ORAL at 13:08

## 2019-12-12 RX ADMIN — HEPARIN SODIUM 5000 UNIT(S): 5000 INJECTION INTRAVENOUS; SUBCUTANEOUS at 15:30

## 2019-12-12 RX ADMIN — Medication 30 MILLIGRAM(S): at 05:32

## 2019-12-12 RX ADMIN — Medication 1 APPLICATION(S): at 11:43

## 2019-12-12 NOTE — PROGRESS NOTE ADULT - ASSESSMENT
89 yo M admitted with gross hematuria likely 2/2 radiation cystitis, less likely due to hx low grade noninvasive bladder cancer (resected) transferred from OSH; was there 3 weeks;  had hyperbaric O2 treatment; went to OR; alum x2 CBI 11/26-11/27. 11/28 - NS CBI.; still bleeding, flushed yesterday for large clots; OR cancelled 12/4, needs cards w/u as per anesthesia, cardiology recs in chart, 12/5 pt underwent cysto, clot evac (300cc), fulguration, instillation of formalin, CBI light pink 12/6, weaned, remains light pink, no manual irrigations required 12/7, clamped 12/9, however became hematuric again, required manual irrigation for approx 200cc clot, Alum CBI restarted on moderate drip, this am alum CBI stopped draining, irrigated, will switch to NS CBI and obtain CT cystogram, CT cystogram no extrav, to restart alum 12/12    Plan:  - restart Alum CBI, monitor color, flush PRN  - AM labs reviewed  - f/u medicine, cardiology, hemeonc  - gentle IVF  - OOB with assistance  - rehab planning

## 2019-12-12 NOTE — PROGRESS NOTE ADULT - SUBJECTIVE AND OBJECTIVE BOX
Overnight events:  Yesterday ALUM changed to NS CBI, CT cystogram performed, no extrav, was tachy w/ chill, no fever after cystogram, CTX x one administered, has remained afebrile with normal VS since    Subjective:  Pt offers no complaints    Objective:    Vital signs  T(C): , Max: 37.2 (12-11-19 @ 18:38)  HR: 80 (12-12-19 @ 05:46)  BP: 124/45 (12-12-19 @ 05:46)  SpO2: 99% (12-12-19 @ 05:46)  Wt(kg): --    Output   CBI translucent red, clears to punch with increased rate  12-11 @ 07:01  -  12-12 @ 07:00  --------------------------------------------------------  IN: 150 mL / OUT: 5000 mL / NET: -4850 mL        Gen: thin, chronically ill appearing  Abd: soft, nontender, no suprapubic tenderness or fullness  : beach secured    Labs                        8.5    13.24 )-----------( 266      ( 12 Dec 2019 05:44 )             27.0     12 Dec 2019 06:00    135    |  105    |  23     ----------------------------<  118    3.9     |  18     |  1.32     Ca    7.7        12 Dec 2019 06:00          Imaging:< from: CT Abdomen and Pelvis w/ IV Cont (12.11.19 @ 17:03) >  IMPRESSION:     No evidence of bladder perforation.    < end of copied text >

## 2019-12-12 NOTE — PROGRESS NOTE ADULT - SUBJECTIVE AND OBJECTIVE BOX
Subjective: Patient seen and examined. No new events except as noted.     SUBJECTIVE/ROS:  resting   NAD       MEDICATIONS:  MEDICATIONS  (STANDING):  ALBUTerol    90 MICROgram(s) HFA Inhaler 2 Puff(s) Inhalation every 6 hours  atorvastatin 20 milliGRAM(s) Oral at bedtime  calcium carbonate   1250 mG (OsCal) 1 Tablet(s) Oral daily  cyanocobalamin 1000 MICROGram(s) Oral daily  dextrose 5% + sodium chloride 0.45%. 1000 milliLiter(s) (50 mL/Hr) IV Continuous <Continuous>  epoetin gianfranco Injectable 09837 Unit(s) SubCutaneous <User Schedule>  ferrous    sulfate 325 milliGRAM(s) Oral daily  finasteride 5 milliGRAM(s) Oral daily  folic acid 1 milliGRAM(s) Oral daily  heparin  Injectable 5000 Unit(s) SubCutaneous every 8 hours  influenza   Vaccine 0.5 milliLiter(s) IntraMuscular once  lidocaine 2% Jelly 20 milliLiter(s) IntraUrethral once  NIFEdipine XL 30 milliGRAM(s) Oral daily  polyethylene glycol 3350 17 Gram(s) Oral daily  senna 2 Tablet(s) Oral at bedtime  sodium chloride 0.9% lock flush 3 milliLiter(s) IV Push every 8 hours  tiotropium 18 MICROgram(s) Capsule 1 Capsule(s) Inhalation daily      PHYSICAL EXAM:  T(C): 36.8 (12-12-19 @ 05:46), Max: 37.2 (12-11-19 @ 18:38)  HR: 80 (12-12-19 @ 05:46) (80 - 138)  BP: 124/45 (12-12-19 @ 05:46) (105/48 - 145/76)  RR: 17 (12-12-19 @ 05:46) (16 - 18)  SpO2: 99% (12-12-19 @ 05:46) (95% - 99%)  Wt(kg): --  I&O's Summary    11 Dec 2019 07:01  -  12 Dec 2019 07:00  --------------------------------------------------------  IN: 150 mL / OUT: 5000 mL / NET: -4850 mL            JVP: Normal  Neck: supple  Lung: clear   CV: S1 S2 , Murmur:  Abd: soft  Ext: No edema  neuro: Awake / alert  Psych: flat affect  Skin: normal``    LABS/DATA:    CARDIAC MARKERS:                                8.5    13.24 )-----------( 266      ( 12 Dec 2019 05:44 )             27.0     12-12    135  |  105  |  23  ----------------------------<  118<H>  3.9   |  18<L>  |  1.32<H>    Ca    7.7<L>      12 Dec 2019 06:00      proBNP:   Lipid Profile:   HgA1c:   TSH:     TELE:  EKG:

## 2019-12-12 NOTE — PROGRESS NOTE ADULT - SUBJECTIVE AND OBJECTIVE BOX
[facility] Division of Hospital Medicine  Yahir Cueva MD  Pager #[phone]    PROGRESS NOTE:     Patient is a 89y old  Male who presents with a chief complaint of Hematuria (08 Dec 2019 07:29)    SUBJECTIVE / OVERNIGHT EVENTS: No acute events. This AM, patient denying any current pain or discomfort    MEDICATIONS  (STANDING):  ALBUTerol    90 MICROgram(s) HFA Inhaler 2 Puff(s) Inhalation every 6 hours  aluminum sulfate 1% Irrigation in sterile water 1 Application(s) Topical once  atorvastatin 20 milliGRAM(s) Oral at bedtime  calcium carbonate   1250 mG (OsCal) 1 Tablet(s) Oral daily  cyanocobalamin 1000 MICROGram(s) Oral daily  dextrose 5% + sodium chloride 0.45%. 1000 milliLiter(s) (50 mL/Hr) IV Continuous <Continuous>  epoetin gianfranco Injectable 47046 Unit(s) SubCutaneous <User Schedule>  ferrous    sulfate 325 milliGRAM(s) Oral daily  finasteride 5 milliGRAM(s) Oral daily  folic acid 1 milliGRAM(s) Oral daily  heparin  Injectable 5000 Unit(s) SubCutaneous every 8 hours  influenza   Vaccine 0.5 milliLiter(s) IntraMuscular once  lidocaine 2% Jelly 20 milliLiter(s) IntraUrethral once  polyethylene glycol 3350 17 Gram(s) Oral daily  senna 2 Tablet(s) Oral at bedtime  sodium chloride 0.9% lock flush 3 milliLiter(s) IV Push every 8 hours  tiotropium 18 MICROgram(s) Capsule 1 Capsule(s) Inhalation daily    MEDICATIONS  (PRN):  belladonna 16.2 mG/opium 30 mg Suppository 1 Suppository(s) Rectal every 6 hours PRN Bladder spasms  ondansetron Injectable 4 milliGRAM(s) IV Push once PRN Nausea and/or Vomiting  oxyCODONE    IR 5 milliGRAM(s) Oral every 4 hours PRN Severe Pain (7 - 10)    Vital Signs Last 24 Hrs  T(C): 36.6 (12 Dec 2019 08:45), Max: 37.2 (11 Dec 2019 18:38)  T(F): 97.8 (12 Dec 2019 08:45), Max: 98.9 (11 Dec 2019 18:38)  HR: 82 (12 Dec 2019 08:45) (80 - 138)  BP: 128/41 (12 Dec 2019 08:45) (105/48 - 145/76)  BP(mean): --  RR: 16 (12 Dec 2019 08:45) (16 - 18)  SpO2: 100% (12 Dec 2019 08:45) (95% - 100%)    I&O's Detail    11 Dec 2019 07:01  -  12 Dec 2019 07:00  --------------------------------------------------------  IN:    dextrose 5% + sodium chloride 0.45%.: 150 mL  Total IN: 150 mL    OUT:    Continuous Bladder Irrigation: 5000 mL  Total OUT: 5000 mL    Total NET: -4850 mL    CAPILLARY BLOOD GLUCOSE    CONSTITUTIONAL: NAD, elderly male, laying in bed  EYES: Conjunctiva/sclera clear  RESPIRATORY: Normal respiratory effort; lungs are clear to auscultation bilaterally  CARDIOVASCULAR: Regular rate and rhythm, normal S1 and S2, +systolic murmur; No lower extremity edema; Peripheral pulses are 2+ bilaterally  ABDOMEN: Nontender to palpation, normoactive bowel sounds, no rebound/guarding; No hepatosplenomegaly  PSYCH: calm  NEURO: Expressive aphasia, comprehension appears intact    LABS:                         8.5    13.24 )-----------( 266      ( 12 Dec 2019 05:44 )             27.0     12-12    135  |  105  |  23  ----------------------------<  118<H>  3.9   |  18<L>  |  1.32<H>    Ca    7.7<L>      12 Dec 2019 06:00    RADIOLOGY, EKG & ADDITIONAL TESTS: Reviewed.

## 2019-12-12 NOTE — CHART NOTE - NSCHARTNOTEFT_GEN_A_CORE
Initiating another Alum treatment.  All tubing changed and bladder manually irrigated with 1500 cc Sterile H2O   About  cc clots irrigated  Pt tolerated well

## 2019-12-12 NOTE — PROGRESS NOTE ADULT - ASSESSMENT
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  Has been at [facility] since 10/31 with hematuria received 15 units PRBCs, gone to OR and had 15 hyperbaric oxygen treatments.  Per oncology pt has established diagnosis of low grade noninvasive bladder cancer from [facility] 10/18/19 with Dr. García Best.  Transferred for persistent hematuria, s/p trial of Alum now s/p  cysto, clot evac, fulguration and instillation of formalin, restarted on alum.

## 2019-12-12 NOTE — PROGRESS NOTE ADULT - PROBLEM SELECTOR PLAN 1
from bleeding presumed from radiation cystitis, low grade noninvasive bladder CA diagnosed recently   Hb stable 8.3-> 9.0 ->8.4 -> 8.8 -> 8.5 today, will try to keep above 8 -(S/P transfusion of 23 units so far?)  - epo tiw. 1 unit transfused on 12/7/19, hg stable since

## 2019-12-12 NOTE — PROGRESS NOTE ADULT - ASSESSMENT
CAD history of cabg, stent   on statin   off asa due to anemia and hematuria   resume in future once deemed safe     murmur  mild to mod MR, mild to mod AS  stable     HTN  will DC nifedipine as BP labile , and has very low intermittent diastolic pressure     Anemia  Monitor hemoglobin, transfuse as needed.

## 2019-12-13 LAB
ANION GAP SERPL CALC-SCNC: 10 MMO/L — SIGNIFICANT CHANGE UP (ref 7–14)
BUN SERPL-MCNC: 20 MG/DL — SIGNIFICANT CHANGE UP (ref 7–23)
CALCIUM SERPL-MCNC: 7.8 MG/DL — LOW (ref 8.4–10.5)
CHLORIDE SERPL-SCNC: 104 MMOL/L — SIGNIFICANT CHANGE UP (ref 98–107)
CO2 SERPL-SCNC: 18 MMOL/L — LOW (ref 22–31)
CREAT SERPL-MCNC: 1.19 MG/DL — SIGNIFICANT CHANGE UP (ref 0.5–1.3)
GLUCOSE SERPL-MCNC: 152 MG/DL — HIGH (ref 70–99)
HCT VFR BLD CALC: 31.7 % — LOW (ref 39–50)
HGB BLD-MCNC: 9.5 G/DL — LOW (ref 13–17)
MAGNESIUM SERPL-MCNC: 1.9 MG/DL — SIGNIFICANT CHANGE UP (ref 1.6–2.6)
MCHC RBC-ENTMCNC: 28.4 PG — SIGNIFICANT CHANGE UP (ref 27–34)
MCHC RBC-ENTMCNC: 30 % — LOW (ref 32–36)
MCV RBC AUTO: 94.9 FL — SIGNIFICANT CHANGE UP (ref 80–100)
NRBC # FLD: 0 K/UL — SIGNIFICANT CHANGE UP (ref 0–0)
PHOSPHATE SERPL-MCNC: 2.7 MG/DL — SIGNIFICANT CHANGE UP (ref 2.5–4.5)
PLATELET # BLD AUTO: 246 K/UL — SIGNIFICANT CHANGE UP (ref 150–400)
PMV BLD: 8.9 FL — SIGNIFICANT CHANGE UP (ref 7–13)
POTASSIUM SERPL-MCNC: 3.8 MMOL/L — SIGNIFICANT CHANGE UP (ref 3.5–5.3)
POTASSIUM SERPL-SCNC: 3.8 MMOL/L — SIGNIFICANT CHANGE UP (ref 3.5–5.3)
RBC # BLD: 3.34 M/UL — LOW (ref 4.2–5.8)
RBC # FLD: 15.5 % — HIGH (ref 10.3–14.5)
SODIUM SERPL-SCNC: 132 MMOL/L — LOW (ref 135–145)
WBC # BLD: 8.5 K/UL — SIGNIFICANT CHANGE UP (ref 3.8–10.5)
WBC # FLD AUTO: 8.5 K/UL — SIGNIFICANT CHANGE UP (ref 3.8–10.5)

## 2019-12-13 PROCEDURE — 99231 SBSQ HOSP IP/OBS SF/LOW 25: CPT

## 2019-12-13 PROCEDURE — 99233 SBSQ HOSP IP/OBS HIGH 50: CPT

## 2019-12-13 RX ORDER — HYDROMORPHONE HYDROCHLORIDE 2 MG/ML
0.5 INJECTION INTRAMUSCULAR; INTRAVENOUS; SUBCUTANEOUS ONCE
Refills: 0 | Status: DISCONTINUED | OUTPATIENT
Start: 2019-12-13 | End: 2019-12-17

## 2019-12-13 RX ORDER — ATROPA BELLADONNA AND OPIUM 16.2; 6 MG/1; MG/1
1 SUPPOSITORY RECTAL ONCE
Refills: 0 | Status: DISCONTINUED | OUTPATIENT
Start: 2019-12-13 | End: 2019-12-13

## 2019-12-13 RX ORDER — OXYCODONE HYDROCHLORIDE 5 MG/1
5 TABLET ORAL EVERY 4 HOURS
Refills: 0 | Status: DISCONTINUED | OUTPATIENT
Start: 2019-12-13 | End: 2019-12-17

## 2019-12-13 RX ORDER — AMLODIPINE BESYLATE 2.5 MG/1
5 TABLET ORAL DAILY
Refills: 0 | Status: DISCONTINUED | OUTPATIENT
Start: 2019-12-13 | End: 2019-12-17

## 2019-12-13 RX ORDER — ATROPA BELLADONNA AND OPIUM 16.2; 6 MG/1; MG/1
1 SUPPOSITORY RECTAL EVERY 8 HOURS
Refills: 0 | Status: DISCONTINUED | OUTPATIENT
Start: 2019-12-13 | End: 2019-12-17

## 2019-12-13 RX ORDER — HYDROMORPHONE HYDROCHLORIDE 2 MG/ML
0.5 INJECTION INTRAMUSCULAR; INTRAVENOUS; SUBCUTANEOUS ONCE
Refills: 0 | Status: DISCONTINUED | OUTPATIENT
Start: 2019-12-13 | End: 2019-12-13

## 2019-12-13 RX ORDER — AMLODIPINE BESYLATE 2.5 MG/1
5 TABLET ORAL DAILY
Refills: 0 | Status: DISCONTINUED | OUTPATIENT
Start: 2019-12-13 | End: 2019-12-13

## 2019-12-13 RX ADMIN — TIOTROPIUM BROMIDE 1 CAPSULE(S): 18 CAPSULE ORAL; RESPIRATORY (INHALATION) at 10:22

## 2019-12-13 RX ADMIN — ATROPA BELLADONNA AND OPIUM 1 SUPPOSITORY(S): 16.2; 6 SUPPOSITORY RECTAL at 22:17

## 2019-12-13 RX ADMIN — HEPARIN SODIUM 5000 UNIT(S): 5000 INJECTION INTRAVENOUS; SUBCUTANEOUS at 22:17

## 2019-12-13 RX ADMIN — Medication 1 MILLIGRAM(S): at 12:07

## 2019-12-13 RX ADMIN — SODIUM CHLORIDE 3 MILLILITER(S): 9 INJECTION INTRAMUSCULAR; INTRAVENOUS; SUBCUTANEOUS at 05:04

## 2019-12-13 RX ADMIN — OXYCODONE HYDROCHLORIDE 5 MILLIGRAM(S): 5 TABLET ORAL at 06:14

## 2019-12-13 RX ADMIN — ATROPA BELLADONNA AND OPIUM 1 SUPPOSITORY(S): 16.2; 6 SUPPOSITORY RECTAL at 15:06

## 2019-12-13 RX ADMIN — Medication 1 TABLET(S): at 12:07

## 2019-12-13 RX ADMIN — SENNA PLUS 2 TABLET(S): 8.6 TABLET ORAL at 22:16

## 2019-12-13 RX ADMIN — Medication 325 MILLIGRAM(S): at 12:07

## 2019-12-13 RX ADMIN — ALBUTEROL 2 PUFF(S): 90 AEROSOL, METERED ORAL at 22:16

## 2019-12-13 RX ADMIN — OXYCODONE HYDROCHLORIDE 5 MILLIGRAM(S): 5 TABLET ORAL at 05:20

## 2019-12-13 RX ADMIN — ATROPA BELLADONNA AND OPIUM 1 SUPPOSITORY(S): 16.2; 6 SUPPOSITORY RECTAL at 14:36

## 2019-12-13 RX ADMIN — ATORVASTATIN CALCIUM 20 MILLIGRAM(S): 80 TABLET, FILM COATED ORAL at 22:16

## 2019-12-13 RX ADMIN — ALBUTEROL 2 PUFF(S): 90 AEROSOL, METERED ORAL at 05:11

## 2019-12-13 RX ADMIN — HYDROMORPHONE HYDROCHLORIDE 0.5 MILLIGRAM(S): 2 INJECTION INTRAMUSCULAR; INTRAVENOUS; SUBCUTANEOUS at 07:12

## 2019-12-13 RX ADMIN — PREGABALIN 1000 MICROGRAM(S): 225 CAPSULE ORAL at 12:07

## 2019-12-13 RX ADMIN — POLYETHYLENE GLYCOL 3350 17 GRAM(S): 17 POWDER, FOR SOLUTION ORAL at 12:08

## 2019-12-13 RX ADMIN — SODIUM CHLORIDE 3 MILLILITER(S): 9 INJECTION INTRAMUSCULAR; INTRAVENOUS; SUBCUTANEOUS at 22:17

## 2019-12-13 RX ADMIN — ERYTHROPOIETIN 10000 UNIT(S): 10000 INJECTION, SOLUTION INTRAVENOUS; SUBCUTANEOUS at 10:22

## 2019-12-13 RX ADMIN — ALBUTEROL 2 PUFF(S): 90 AEROSOL, METERED ORAL at 17:37

## 2019-12-13 RX ADMIN — ATROPA BELLADONNA AND OPIUM 1 SUPPOSITORY(S): 16.2; 6 SUPPOSITORY RECTAL at 07:19

## 2019-12-13 RX ADMIN — ALBUTEROL 2 PUFF(S): 90 AEROSOL, METERED ORAL at 10:22

## 2019-12-13 RX ADMIN — SODIUM CHLORIDE 3 MILLILITER(S): 9 INJECTION INTRAMUSCULAR; INTRAVENOUS; SUBCUTANEOUS at 14:27

## 2019-12-13 RX ADMIN — HEPARIN SODIUM 5000 UNIT(S): 5000 INJECTION INTRAVENOUS; SUBCUTANEOUS at 05:11

## 2019-12-13 RX ADMIN — HYDROMORPHONE HYDROCHLORIDE 0.5 MILLIGRAM(S): 2 INJECTION INTRAMUSCULAR; INTRAVENOUS; SUBCUTANEOUS at 06:39

## 2019-12-13 RX ADMIN — FINASTERIDE 5 MILLIGRAM(S): 5 TABLET, FILM COATED ORAL at 12:07

## 2019-12-13 NOTE — PROGRESS NOTE ADULT - ASSESSMENT
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  Has been at [facility] since 10/31 with hematuria received 15 units PRBCs, gone to OR and had 15 hyperbaric oxygen treatments.  Per oncology pt has established diagnosis of low grade noninvasive bladder cancer from [facility] 10/18/19 with Dr. García Best.  Transferred for persistent hematuria, s/p trial of Alum now s/p  cysto, clot evac, fulguration and instillation of formalin, and alum.

## 2019-12-13 NOTE — PROGRESS NOTE ADULT - SUBJECTIVE AND OBJECTIVE BOX
Overnight events:  Did not require manual irrigation overnight while on alum    Subjective:  Pt c/o bladder spasms    Objective:    Vital signs  T(C): , Max: 36.9 (12-13-19 @ 02:01)  HR: 84 (12-13-19 @ 05:17)  BP: 153/91 (12-13-19 @ 05:17)  SpO2: 97% (12-13-19 @ 05:17)  Wt(kg): --    Output   Alum CBI brown    Gen: mild distress secondary to spasms  Abd: soft, nontender, no suprapubic fullness   : beach unable to be flushed, removed    Labs: pending

## 2019-12-13 NOTE — PROGRESS NOTE ADULT - SUBJECTIVE AND OBJECTIVE BOX
Subjective: Patient seen and examined. No new events except as noted.     SUBJECTIVE/ROS:    MEDICATIONS:  MEDICATIONS  (STANDING):  ALBUTerol    90 MICROgram(s) HFA Inhaler 2 Puff(s) Inhalation every 6 hours  atorvastatin 20 milliGRAM(s) Oral at bedtime  belladonna 16.2 mG/opium 30 mg Suppository 1 Suppository(s) Rectal every 8 hours  calcium carbonate   1250 mG (OsCal) 1 Tablet(s) Oral daily  cyanocobalamin 1000 MICROGram(s) Oral daily  dextrose 5% + sodium chloride 0.45%. 1000 milliLiter(s) (50 mL/Hr) IV Continuous <Continuous>  epoetin gianfranco Injectable 44441 Unit(s) SubCutaneous <User Schedule>  ferrous    sulfate 325 milliGRAM(s) Oral daily  finasteride 5 milliGRAM(s) Oral daily  folic acid 1 milliGRAM(s) Oral daily  heparin  Injectable 5000 Unit(s) SubCutaneous every 8 hours  HYDROmorphone  Injectable 0.5 milliGRAM(s) IV Push once  influenza   Vaccine 0.5 milliLiter(s) IntraMuscular once  lidocaine 2% Jelly 20 milliLiter(s) IntraUrethral once  polyethylene glycol 3350 17 Gram(s) Oral daily  senna 2 Tablet(s) Oral at bedtime  sodium chloride 0.9% lock flush 3 milliLiter(s) IV Push every 8 hours  tiotropium 18 MICROgram(s) Capsule 1 Capsule(s) Inhalation daily      PHYSICAL EXAM:  T(C): 36.4 (12-13-19 @ 05:17), Max: 36.9 (12-13-19 @ 02:01)  HR: 84 (12-13-19 @ 05:17) (73 - 84)  BP: 153/91 (12-13-19 @ 05:17) (122/73 - 153/91)  RR: 16 (12-13-19 @ 05:17) (16 - 17)  SpO2: 97% (12-13-19 @ 05:17) (97% - 100%)  Wt(kg): --  I&O's Summary          JVP: Normal  Neck: supple  Lung: clear   CV: S1 S2 , Murmur:  Abd: soft  Ext: No edema  neuro: Awake / alert  Psych: flat affect  Skin: normal``    LABS/DATA:    CARDIAC MARKERS:                                8.5    13.24 )-----------( 266      ( 12 Dec 2019 05:44 )             27.0     12-12    135  |  105  |  23  ----------------------------<  118<H>  3.9   |  18<L>  |  1.32<H>    Ca    7.7<L>      12 Dec 2019 06:00      proBNP:   Lipid Profile:   HgA1c:   TSH:     TELE:  EKG:

## 2019-12-13 NOTE — PROGRESS NOTE ADULT - SUBJECTIVE AND OBJECTIVE BOX
[facility] Division of Hospital Medicine  Yahir Cueva MD  Pager #[phone]    PROGRESS NOTE:     Patient is a 89y old  Male who presents with a chief complaint of Hematuria    SUBJECTIVE: Patient sitting up, appearing comfortable, currently nodding no to pain or discomfort. Recently had some PO intake, denies nausea or vomiting. Wife at bedside.    MEDICATIONS  (STANDING):  ALBUTerol    90 MICROgram(s) HFA Inhaler 2 Puff(s) Inhalation every 6 hours  amLODIPine   Tablet 5 milliGRAM(s) Oral daily  atorvastatin 20 milliGRAM(s) Oral at bedtime  belladonna 16.2 mG/opium 30 mg Suppository 1 Suppository(s) Rectal every 8 hours  calcium carbonate   1250 mG (OsCal) 1 Tablet(s) Oral daily  cyanocobalamin 1000 MICROGram(s) Oral daily  dextrose 5% + sodium chloride 0.45%. 1000 milliLiter(s) (50 mL/Hr) IV Continuous <Continuous>  epoetin gianfranco Injectable 14720 Unit(s) SubCutaneous <User Schedule>  ferrous    sulfate 325 milliGRAM(s) Oral daily  finasteride 5 milliGRAM(s) Oral daily  folic acid 1 milliGRAM(s) Oral daily  heparin  Injectable 5000 Unit(s) SubCutaneous every 8 hours  HYDROmorphone  Injectable 0.5 milliGRAM(s) IV Push once  influenza   Vaccine 0.5 milliLiter(s) IntraMuscular once  lidocaine 2% Jelly 20 milliLiter(s) IntraUrethral once  polyethylene glycol 3350 17 Gram(s) Oral daily  senna 2 Tablet(s) Oral at bedtime  sodium chloride 0.9% lock flush 3 milliLiter(s) IV Push every 8 hours  tiotropium 18 MICROgram(s) Capsule 1 Capsule(s) Inhalation daily    MEDICATIONS  (PRN):  ondansetron Injectable 4 milliGRAM(s) IV Push once PRN Nausea and/or Vomiting  oxyCODONE    IR 5 milliGRAM(s) Oral every 4 hours PRN Severe Pain (7 - 10)    Vital Signs Last 24 Hrs  T(C): 36.8 (13 Dec 2019 10:17), Max: 36.9 (13 Dec 2019 02:01)  T(F): 98.2 (13 Dec 2019 10:17), Max: 98.5 (13 Dec 2019 02:01)  HR: 84 (13 Dec 2019 10:17) (73 - 84)  BP: 102/44 (13 Dec 2019 10:17) (102/44 - 153/91)  BP(mean): --  RR: 16 (13 Dec 2019 10:17) (16 - 17)  SpO2: 100% (13 Dec 2019 10:17) (97% - 100%)    I&O's Detail    CAPILLARY BLOOD GLUCOSE    CONSTITUTIONAL: elderly male, sitting up in bed, appearing comfortable  EYES: Conjunctiva/sclera clear  RESPIRATORY: trace bibasilar crackles, no wheezing, normal respiratory effort  CARDIOVASCULAR: Regular rate and rhythm, normal S1 and S2, +systolic murmur; Peripheral pulses are 2+ bilaterally, no significant LE edema bilaterally  ABDOMEN: Nontender to palpation, normoactive bowel sounds, no rebound/guarding; No hepatosplenomegaly  PSYCH: calm  NEURO: Expressive aphasia, comprehension appears intact, nodding appropriately, minimally conversive    LABS:                         9.5    8.50  )-----------( 246      ( 13 Dec 2019 10:00 )             31.7     12-13    132<L>  |  104  |  20  ----------------------------<  152<H>  3.8   |  18<L>  |  1.19    Ca    7.8<L>      13 Dec 2019 10:00  Phos  2.7     12-13  Mg     1.9     12-13    RADIOLOGY, EKG & ADDITIONAL TESTS: Reviewed.

## 2019-12-13 NOTE — PROGRESS NOTE ADULT - PROBLEM SELECTOR PLAN 1
from bleeding presumed from radiation cystitis, low grade noninvasive bladder CA diagnosed recently   S/P transfusion of 23 units so far? Most recently 1 unit transfused on 12/7/19, hg stable since, awaiting today's hg (unable to get blood draw this AM due to difficult stick  - epo tiw

## 2019-12-13 NOTE — PROGRESS NOTE ADULT - PROBLEM SELECTOR PLAN 2
bleeding presumed from radiation cystitis, low grade noninvasive bladder CA diagnosed recently  management per urology - s/p Alum. 12/5 cysto, clot evac, fulguration and instillation of formalin and restarted on alum, 12/11/19 CT urogram without evidence of bladder perforation, management per uro

## 2019-12-13 NOTE — PROGRESS NOTE ADULT - ASSESSMENT
89 yo M admitted with gross hematuria likely 2/2 radiation cystitis, less likely due to hx low grade noninvasive bladder cancer (resected) transferred from OSH; was there 3 weeks;  had hyperbaric O2 treatment; went to OR; alum x2 CBI 11/26-11/27. 11/28 - NS CBI.; still bleeding, flushed yesterday for large clots; OR cancelled 12/4, needs cards w/u as per anesthesia, cardiology recs in chart, 12/5 pt underwent cysto, clot evac (300cc), fulguration, instillation of formalin, CBI light pink 12/6, weaned, remains light pink, no manual irrigations required 12/7, clamped 12/9, however became hematuric again, required manual irrigation for approx 200cc clot, Alum CBI restarted on moderate drip, this am alum CBI stopped draining, irrigated, will switch to NS CBI and obtain CT cystogram, CT cystogram no extrav, to restart alum 12/12, 12/13 beach unable to be flushed, removed and new 24 fr 3 way placed, irrigated to clear light punch, sterile water restarted at slow drip    Plan:  - sterile water CBI at very slow drip for now, monitor color, flush gently PRN  - will d/w Dr. Carroll CBI plan  - continue B&Os  - f/u AM labs   - nifedipine d/c per cards  - f/u medicine, cardiology, [facility]  - gentle IVF  - OOB with assistance  - rehab planning

## 2019-12-13 NOTE — PROGRESS NOTE ADULT - ASSESSMENT
CAD history of cabg, stent   on statin   off asa due to anemia and hematuria   resume in future once deemed safe     murmur  mild to mod MR, mild to mod AS  stable     HTN  off Nifedipine due to low diastolic pressure  will start low dose amlodipine     Anemia  Monitor hemoglobin, transfuse as needed.

## 2019-12-14 LAB
ANION GAP SERPL CALC-SCNC: 8 MMO/L — SIGNIFICANT CHANGE UP (ref 7–14)
BLD GP AB SCN SERPL QL: NEGATIVE — SIGNIFICANT CHANGE UP
BUN SERPL-MCNC: 16 MG/DL — SIGNIFICANT CHANGE UP (ref 7–23)
CALCIUM SERPL-MCNC: 7.9 MG/DL — LOW (ref 8.4–10.5)
CHLORIDE SERPL-SCNC: 107 MMOL/L — SIGNIFICANT CHANGE UP (ref 98–107)
CO2 SERPL-SCNC: 21 MMOL/L — LOW (ref 22–31)
CREAT SERPL-MCNC: 1.06 MG/DL — SIGNIFICANT CHANGE UP (ref 0.5–1.3)
GLUCOSE SERPL-MCNC: 103 MG/DL — HIGH (ref 70–99)
HCT VFR BLD CALC: 24.9 % — LOW (ref 39–50)
HGB BLD-MCNC: 7.5 G/DL — LOW (ref 13–17)
MCHC RBC-ENTMCNC: 27.5 PG — SIGNIFICANT CHANGE UP (ref 27–34)
MCHC RBC-ENTMCNC: 30.1 % — LOW (ref 32–36)
MCV RBC AUTO: 91.2 FL — SIGNIFICANT CHANGE UP (ref 80–100)
NRBC # FLD: 0 K/UL — SIGNIFICANT CHANGE UP (ref 0–0)
PLATELET # BLD AUTO: 241 K/UL — SIGNIFICANT CHANGE UP (ref 150–400)
PMV BLD: 9.1 FL — SIGNIFICANT CHANGE UP (ref 7–13)
POTASSIUM SERPL-MCNC: 3.8 MMOL/L — SIGNIFICANT CHANGE UP (ref 3.5–5.3)
POTASSIUM SERPL-SCNC: 3.8 MMOL/L — SIGNIFICANT CHANGE UP (ref 3.5–5.3)
RBC # BLD: 2.73 M/UL — LOW (ref 4.2–5.8)
RBC # FLD: 15 % — HIGH (ref 10.3–14.5)
RH IG SCN BLD-IMP: POSITIVE — SIGNIFICANT CHANGE UP
SODIUM SERPL-SCNC: 136 MMOL/L — SIGNIFICANT CHANGE UP (ref 135–145)
WBC # BLD: 4.73 K/UL — SIGNIFICANT CHANGE UP (ref 3.8–10.5)
WBC # FLD AUTO: 4.73 K/UL — SIGNIFICANT CHANGE UP (ref 3.8–10.5)

## 2019-12-14 PROCEDURE — 99233 SBSQ HOSP IP/OBS HIGH 50: CPT

## 2019-12-14 RX ADMIN — SODIUM CHLORIDE 3 MILLILITER(S): 9 INJECTION INTRAMUSCULAR; INTRAVENOUS; SUBCUTANEOUS at 06:34

## 2019-12-14 RX ADMIN — TIOTROPIUM BROMIDE 1 CAPSULE(S): 18 CAPSULE ORAL; RESPIRATORY (INHALATION) at 12:49

## 2019-12-14 RX ADMIN — ALBUTEROL 2 PUFF(S): 90 AEROSOL, METERED ORAL at 15:17

## 2019-12-14 RX ADMIN — Medication 1 MILLIGRAM(S): at 12:51

## 2019-12-14 RX ADMIN — SODIUM CHLORIDE 3 MILLILITER(S): 9 INJECTION INTRAMUSCULAR; INTRAVENOUS; SUBCUTANEOUS at 15:14

## 2019-12-14 RX ADMIN — PREGABALIN 1000 MICROGRAM(S): 225 CAPSULE ORAL at 12:50

## 2019-12-14 RX ADMIN — Medication 1 TABLET(S): at 12:50

## 2019-12-14 RX ADMIN — ATORVASTATIN CALCIUM 20 MILLIGRAM(S): 80 TABLET, FILM COATED ORAL at 21:08

## 2019-12-14 RX ADMIN — HEPARIN SODIUM 5000 UNIT(S): 5000 INJECTION INTRAVENOUS; SUBCUTANEOUS at 21:09

## 2019-12-14 RX ADMIN — FINASTERIDE 5 MILLIGRAM(S): 5 TABLET, FILM COATED ORAL at 12:51

## 2019-12-14 RX ADMIN — ATROPA BELLADONNA AND OPIUM 1 SUPPOSITORY(S): 16.2; 6 SUPPOSITORY RECTAL at 06:35

## 2019-12-14 RX ADMIN — ALBUTEROL 2 PUFF(S): 90 AEROSOL, METERED ORAL at 21:08

## 2019-12-14 RX ADMIN — SODIUM CHLORIDE 3 MILLILITER(S): 9 INJECTION INTRAMUSCULAR; INTRAVENOUS; SUBCUTANEOUS at 21:07

## 2019-12-14 RX ADMIN — POLYETHYLENE GLYCOL 3350 17 GRAM(S): 17 POWDER, FOR SOLUTION ORAL at 12:51

## 2019-12-14 RX ADMIN — HEPARIN SODIUM 5000 UNIT(S): 5000 INJECTION INTRAVENOUS; SUBCUTANEOUS at 06:35

## 2019-12-14 RX ADMIN — AMLODIPINE BESYLATE 5 MILLIGRAM(S): 2.5 TABLET ORAL at 06:35

## 2019-12-14 RX ADMIN — HEPARIN SODIUM 5000 UNIT(S): 5000 INJECTION INTRAVENOUS; SUBCUTANEOUS at 14:57

## 2019-12-14 RX ADMIN — ATROPA BELLADONNA AND OPIUM 1 SUPPOSITORY(S): 16.2; 6 SUPPOSITORY RECTAL at 21:09

## 2019-12-14 RX ADMIN — Medication 325 MILLIGRAM(S): at 12:51

## 2019-12-14 NOTE — PROGRESS NOTE ADULT - SUBJECTIVE AND OBJECTIVE BOX
[facility] Division of Hospital Medicine  Nadir Sun MD  Pager [phone]    Patient is a 89y old  Male who presents with a chief complaint of Hematuria (14 Dec 2019 07:49)      SUBJECTIVE / OVERNIGHT EVENTS: pink urine in bag      MEDICATIONS  (STANDING):  ALBUTerol    90 MICROgram(s) HFA Inhaler 2 Puff(s) Inhalation every 6 hours  amLODIPine   Tablet 5 milliGRAM(s) Oral daily  atorvastatin 20 milliGRAM(s) Oral at bedtime  belladonna 16.2 mG/opium 30 mg Suppository 1 Suppository(s) Rectal every 8 hours  calcium carbonate   1250 mG (OsCal) 1 Tablet(s) Oral daily  cyanocobalamin 1000 MICROGram(s) Oral daily  epoetin gianfranco Injectable 65635 Unit(s) SubCutaneous <User Schedule>  ferrous    sulfate 325 milliGRAM(s) Oral daily  finasteride 5 milliGRAM(s) Oral daily  folic acid 1 milliGRAM(s) Oral daily  heparin  Injectable 5000 Unit(s) SubCutaneous every 8 hours  HYDROmorphone  Injectable 0.5 milliGRAM(s) IV Push once  influenza   Vaccine 0.5 milliLiter(s) IntraMuscular once  lidocaine 2% Jelly 20 milliLiter(s) IntraUrethral once  polyethylene glycol 3350 17 Gram(s) Oral daily  senna 2 Tablet(s) Oral at bedtime  sodium chloride 0.9% lock flush 3 milliLiter(s) IV Push every 8 hours  tiotropium 18 MICROgram(s) Capsule 1 Capsule(s) Inhalation daily    MEDICATIONS  (PRN):  ondansetron Injectable 4 milliGRAM(s) IV Push once PRN Nausea and/or Vomiting  oxyCODONE    IR 5 milliGRAM(s) Oral every 4 hours PRN Severe Pain (7 - 10)      CAPILLARY BLOOD GLUCOSE        I&O's Summary    13 Dec 2019 07:01  -  14 Dec 2019 07:00  --------------------------------------------------------  IN: 0 mL / OUT: 5200 mL / NET: -5200 mL        PHYSICAL EXAM:  Vital Signs Last 24 Hrs  T(C): 36.7 (14 Dec 2019 09:26), Max: 36.7 (13 Dec 2019 17:38)  T(F): 98.1 (14 Dec 2019 09:26), Max: 98.1 (14 Dec 2019 09:26)  HR: 82 (14 Dec 2019 09:26) (73 - 82)  BP: 103/48 (14 Dec 2019 09:26) (103/48 - 133/48)  BP(mean): 70 (13 Dec 2019 21:54) (70 - 70)  RR: 16 (14 Dec 2019 09:26) (15 - 17)  SpO2: 98% (14 Dec 2019 09:26) (97% - 100%)  CONSTITUTIONAL: elderly male, sitting up in bed, appearing comfortable  EYES: Conjunctiva/sclera clear  RESPIRATORY: trace bibasilar crackles, no wheezing, normal respiratory effort  CARDIOVASCULAR: Regular rate and rhythm, normal S1 and S2, +systolic murmur; Peripheral pulses are 2+ bilaterally, no significant LE edema bilaterally  ABDOMEN: Nontender to palpation, normoactive bowel sounds, no rebound/guarding; No hepatosplenomegaly  PSYCH: calm  NEURO: Expressive aphasia, comprehension appears intact, nodding appropriately, minimally conversive    LABS:                        7.5    4.73  )-----------( 241      ( 14 Dec 2019 06:45 )             24.9     12-14    136  |  107  |  16  ----------------------------<  103<H>  3.8   |  21<L>  |  1.06    Ca    7.9<L>      14 Dec 2019 06:45  Phos  2.7     12-13  Mg     1.9     12-13          COORDINATION OF CARE:  Care Discussed with Consultants/Other Providers [Y/N]: urology  Prior or Outpatient Records Reviewed [Y/N]:

## 2019-12-14 NOTE — PROGRESS NOTE ADULT - SUBJECTIVE AND OBJECTIVE BOX
Overnight events:  None, CBI remained clear    Subjective:  Pt offers no complaints feels better this am, was able to eat more yesterday    Objective:    Vital signs  T(C): , Max: 36.8 (12-13-19 @ 10:17)  HR: 78 (12-14-19 @ 01:34)  BP: 123/47 (12-14-19 @ 01:34)  SpO2: 100% (12-14-19 @ 01:34)  Wt(kg): --    Output   CBI clear on slow drip  12-13 @ 07:01  -  12-14 @ 07:00  --------------------------------------------------------  IN: 0 mL / OUT: 5200 mL / NET: -5200 mL        Gen: NAD, thin, chronically ill appearing  Abd: soft, nontender  : yong secured    Labs: pending

## 2019-12-14 NOTE — PROGRESS NOTE ADULT - PROBLEM SELECTOR PLAN 8
As per prior hospitalist -started discussion of ACP, wife very supportive, copy of MOLST given to pt/wife to review with family

## 2019-12-14 NOTE — PROGRESS NOTE ADULT - ASSESSMENT
CAD history of cabg, stent   on statin   off asa due to anemia and hematuria   resume in future once deemed safe     murmur  mild to mod MR, mild to mod AS  stable     HTN  stable on amlodipine     Anemia  Monitor hemoglobin, transfuse as needed.

## 2019-12-14 NOTE — PROGRESS NOTE ADULT - ASSESSMENT
89 yo M admitted with gross hematuria likely 2/2 radiation cystitis, less likely due to hx low grade noninvasive bladder cancer (resected) transferred from OSH; was there 3 weeks;  had hyperbaric O2 treatment; went to OR; alum x2 CBI 11/26-11/27. 11/28 - NS CBI.; still bleeding, flushed yesterday for large clots; OR cancelled 12/4, needs cards w/u as per anesthesia, cardiology recs in chart, 12/5 pt underwent cysto, clot evac (300cc), fulguration, instillation of formalin, CBI light pink 12/6, weaned, remains light pink, no manual irrigations required 12/7, clamped 12/9, however became hematuric again, required manual irrigation for approx 200cc clot, Alum CBI restarted on moderate drip, this am alum CBI stopped draining, irrigated, will switch to NS CBI and obtain CT cystogram, CT cystogram no extrav, to restart alum 12/12, 12/13 beach unable to be flushed, removed and new 24 fr 3 way placed, irrigated to clear light punch, sterile water restarted at slow drip, changed to NS CBI 12/13, remains clear with stable HCT    Plan:  - NS CBI at very slow drip, monitor color, flush gently PRN  - continue B&Os  - f/u AM labs   - nifedipine d/c per cards, norvasc started  - f/u medicine, cardiology, hemeon  - IVL  - OOB with assistance  - rehab planning

## 2019-12-14 NOTE — PROGRESS NOTE ADULT - ASSESSMENT
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  Has been at [facility] since 10/31 with hematuria received 15 units PRBCs, gone to OR and had 15 hyperbaric oxygen treatments.  Per oncology pt has established diagnosis of low grade noninvasive bladder cancer from [facility] 10/18/19 with Dr. García Best.  Transferred for persistent hematuria, s/p trial of Alum now s/p  cysto, clot evac, fulguration on 12/5/19 and s/p instillation of formalin, and alum.

## 2019-12-14 NOTE — PROGRESS NOTE ADULT - SUBJECTIVE AND OBJECTIVE BOX
Subjective: Patient seen and examined. No new events except as noted.     SUBJECTIVE/ROS:  No chest pain, dyspnea, palpitation, or dizziness.       MEDICATIONS:  MEDICATIONS  (STANDING):  ALBUTerol    90 MICROgram(s) HFA Inhaler 2 Puff(s) Inhalation every 6 hours  amLODIPine   Tablet 5 milliGRAM(s) Oral daily  atorvastatin 20 milliGRAM(s) Oral at bedtime  belladonna 16.2 mG/opium 30 mg Suppository 1 Suppository(s) Rectal every 8 hours  calcium carbonate   1250 mG (OsCal) 1 Tablet(s) Oral daily  cyanocobalamin 1000 MICROGram(s) Oral daily  epoetin gianfranco Injectable 30704 Unit(s) SubCutaneous <User Schedule>  ferrous    sulfate 325 milliGRAM(s) Oral daily  finasteride 5 milliGRAM(s) Oral daily  folic acid 1 milliGRAM(s) Oral daily  heparin  Injectable 5000 Unit(s) SubCutaneous every 8 hours  HYDROmorphone  Injectable 0.5 milliGRAM(s) IV Push once  influenza   Vaccine 0.5 milliLiter(s) IntraMuscular once  lidocaine 2% Jelly 20 milliLiter(s) IntraUrethral once  polyethylene glycol 3350 17 Gram(s) Oral daily  senna 2 Tablet(s) Oral at bedtime  sodium chloride 0.9% lock flush 3 milliLiter(s) IV Push every 8 hours  tiotropium 18 MICROgram(s) Capsule 1 Capsule(s) Inhalation daily      PHYSICAL EXAM:  T(C): 36.6 (12-14-19 @ 01:34), Max: 36.8 (12-13-19 @ 10:17)  HR: 78 (12-14-19 @ 01:34) (73 - 84)  BP: 123/47 (12-14-19 @ 01:34) (102/44 - 133/48)  RR: 15 (12-14-19 @ 01:34) (15 - 16)  SpO2: 100% (12-14-19 @ 01:34) (97% - 100%)  Wt(kg): --  I&O's Summary    13 Dec 2019 07:01  -  14 Dec 2019 07:00  --------------------------------------------------------  IN: 0 mL / OUT: 5200 mL / NET: -5200 mL            JVP: Normal  Neck: supple  Lung: clear   CV: S1 S2 , Murmur:  Abd: soft  Ext: No edema  neuro: Awake / alert  Psych: flat affect  Skin: normal``    LABS/DATA:    CARDIAC MARKERS:                                7.5    4.73  )-----------( 241      ( 14 Dec 2019 06:45 )             24.9     12-13    132<L>  |  104  |  20  ----------------------------<  152<H>  3.8   |  18<L>  |  1.19    Ca    7.8<L>      13 Dec 2019 10:00  Phos  2.7     12-13  Mg     1.9     12-13      proBNP:   Lipid Profile:   HgA1c:   TSH:     TELE:  EKG:

## 2019-12-14 NOTE — PROGRESS NOTE ADULT - PROBLEM SELECTOR PLAN 1
from bleeding presumed from radiation cystitis, low grade noninvasive bladder CA diagnosed recently   Continues to require transfusions  - epo tiw

## 2019-12-15 LAB
ANION GAP SERPL CALC-SCNC: 9 MMO/L — SIGNIFICANT CHANGE UP (ref 7–14)
APTT BLD: 27.2 SEC — LOW (ref 27.5–36.3)
BUN SERPL-MCNC: 13 MG/DL — SIGNIFICANT CHANGE UP (ref 7–23)
CALCIUM SERPL-MCNC: 8.1 MG/DL — LOW (ref 8.4–10.5)
CHLORIDE SERPL-SCNC: 107 MMOL/L — SIGNIFICANT CHANGE UP (ref 98–107)
CO2 SERPL-SCNC: 21 MMOL/L — LOW (ref 22–31)
CREAT SERPL-MCNC: 1.07 MG/DL — SIGNIFICANT CHANGE UP (ref 0.5–1.3)
GLUCOSE SERPL-MCNC: 108 MG/DL — HIGH (ref 70–99)
HCT VFR BLD CALC: 28.9 % — LOW (ref 39–50)
HGB BLD-MCNC: 8.9 G/DL — LOW (ref 13–17)
INR BLD: 1 — SIGNIFICANT CHANGE UP (ref 0.88–1.17)
MCHC RBC-ENTMCNC: 28.1 PG — SIGNIFICANT CHANGE UP (ref 27–34)
MCHC RBC-ENTMCNC: 30.8 % — LOW (ref 32–36)
MCV RBC AUTO: 91.2 FL — SIGNIFICANT CHANGE UP (ref 80–100)
NRBC # FLD: 0 K/UL — SIGNIFICANT CHANGE UP (ref 0–0)
PLATELET # BLD AUTO: 270 K/UL — SIGNIFICANT CHANGE UP (ref 150–400)
PMV BLD: 8.7 FL — SIGNIFICANT CHANGE UP (ref 7–13)
POTASSIUM SERPL-MCNC: 3.9 MMOL/L — SIGNIFICANT CHANGE UP (ref 3.5–5.3)
POTASSIUM SERPL-SCNC: 3.9 MMOL/L — SIGNIFICANT CHANGE UP (ref 3.5–5.3)
PROTHROM AB SERPL-ACNC: 11.4 SEC — SIGNIFICANT CHANGE UP (ref 9.8–13.1)
RBC # BLD: 3.17 M/UL — LOW (ref 4.2–5.8)
RBC # FLD: 14.8 % — HIGH (ref 10.3–14.5)
SODIUM SERPL-SCNC: 137 MMOL/L — SIGNIFICANT CHANGE UP (ref 135–145)
WBC # BLD: 4.44 K/UL — SIGNIFICANT CHANGE UP (ref 3.8–10.5)
WBC # FLD AUTO: 4.44 K/UL — SIGNIFICANT CHANGE UP (ref 3.8–10.5)

## 2019-12-15 PROCEDURE — 99233 SBSQ HOSP IP/OBS HIGH 50: CPT

## 2019-12-15 RX ADMIN — ATROPA BELLADONNA AND OPIUM 1 SUPPOSITORY(S): 16.2; 6 SUPPOSITORY RECTAL at 15:06

## 2019-12-15 RX ADMIN — FINASTERIDE 5 MILLIGRAM(S): 5 TABLET, FILM COATED ORAL at 12:54

## 2019-12-15 RX ADMIN — PREGABALIN 1000 MICROGRAM(S): 225 CAPSULE ORAL at 12:54

## 2019-12-15 RX ADMIN — HEPARIN SODIUM 5000 UNIT(S): 5000 INJECTION INTRAVENOUS; SUBCUTANEOUS at 15:06

## 2019-12-15 RX ADMIN — ALBUTEROL 2 PUFF(S): 90 AEROSOL, METERED ORAL at 21:38

## 2019-12-15 RX ADMIN — ATORVASTATIN CALCIUM 20 MILLIGRAM(S): 80 TABLET, FILM COATED ORAL at 21:38

## 2019-12-15 RX ADMIN — Medication 1 TABLET(S): at 12:54

## 2019-12-15 RX ADMIN — ATROPA BELLADONNA AND OPIUM 1 SUPPOSITORY(S): 16.2; 6 SUPPOSITORY RECTAL at 05:32

## 2019-12-15 RX ADMIN — ALBUTEROL 2 PUFF(S): 90 AEROSOL, METERED ORAL at 12:54

## 2019-12-15 RX ADMIN — Medication 1 MILLIGRAM(S): at 12:54

## 2019-12-15 RX ADMIN — AMLODIPINE BESYLATE 5 MILLIGRAM(S): 2.5 TABLET ORAL at 05:31

## 2019-12-15 RX ADMIN — HEPARIN SODIUM 5000 UNIT(S): 5000 INJECTION INTRAVENOUS; SUBCUTANEOUS at 21:38

## 2019-12-15 RX ADMIN — ALBUTEROL 2 PUFF(S): 90 AEROSOL, METERED ORAL at 17:57

## 2019-12-15 RX ADMIN — SODIUM CHLORIDE 3 MILLILITER(S): 9 INJECTION INTRAMUSCULAR; INTRAVENOUS; SUBCUTANEOUS at 14:44

## 2019-12-15 RX ADMIN — TIOTROPIUM BROMIDE 1 CAPSULE(S): 18 CAPSULE ORAL; RESPIRATORY (INHALATION) at 12:53

## 2019-12-15 RX ADMIN — ALBUTEROL 2 PUFF(S): 90 AEROSOL, METERED ORAL at 05:30

## 2019-12-15 RX ADMIN — Medication 325 MILLIGRAM(S): at 12:54

## 2019-12-15 RX ADMIN — SODIUM CHLORIDE 3 MILLILITER(S): 9 INJECTION INTRAMUSCULAR; INTRAVENOUS; SUBCUTANEOUS at 21:38

## 2019-12-15 RX ADMIN — HEPARIN SODIUM 5000 UNIT(S): 5000 INJECTION INTRAVENOUS; SUBCUTANEOUS at 05:31

## 2019-12-15 NOTE — PROGRESS NOTE ADULT - SUBJECTIVE AND OBJECTIVE BOX
Subjective: Patient seen and examined. No new events except as noted.     SUBJECTIVE/ROS:        MEDICATIONS:  MEDICATIONS  (STANDING):  ALBUTerol    90 MICROgram(s) HFA Inhaler 2 Puff(s) Inhalation every 6 hours  amLODIPine   Tablet 5 milliGRAM(s) Oral daily  atorvastatin 20 milliGRAM(s) Oral at bedtime  belladonna 16.2 mG/opium 30 mg Suppository 1 Suppository(s) Rectal every 8 hours  calcium carbonate   1250 mG (OsCal) 1 Tablet(s) Oral daily  cyanocobalamin 1000 MICROGram(s) Oral daily  epoetin gianfranco Injectable 45275 Unit(s) SubCutaneous <User Schedule>  ferrous    sulfate 325 milliGRAM(s) Oral daily  finasteride 5 milliGRAM(s) Oral daily  folic acid 1 milliGRAM(s) Oral daily  heparin  Injectable 5000 Unit(s) SubCutaneous every 8 hours  HYDROmorphone  Injectable 0.5 milliGRAM(s) IV Push once  influenza   Vaccine 0.5 milliLiter(s) IntraMuscular once  lidocaine 2% Jelly 20 milliLiter(s) IntraUrethral once  polyethylene glycol 3350 17 Gram(s) Oral daily  senna 2 Tablet(s) Oral at bedtime  sodium chloride 0.9% lock flush 3 milliLiter(s) IV Push every 8 hours  tiotropium 18 MICROgram(s) Capsule 1 Capsule(s) Inhalation daily      PHYSICAL EXAM:  T(C): 36.4 (12-15-19 @ 05:31), Max: 36.9 (12-14-19 @ 21:07)  HR: 73 (12-15-19 @ 05:31) (69 - 87)  BP: 144/50 (12-15-19 @ 05:31) (103/48 - 144/50)  RR: 16 (12-15-19 @ 05:31) (16 - 17)  SpO2: 98% (12-15-19 @ 05:31) (95% - 98%)  Wt(kg): --  I&O's Summary    13 Dec 2019 07:01  -  14 Dec 2019 07:00  --------------------------------------------------------  IN: 0 mL / OUT: 5200 mL / NET: -5200 mL            JVP: Normal  Neck: supple  Lung: clear   CV: S1 S2 , Murmur:  Abd: soft  Ext: No edema  neuro: Awake / alert  Psych: flat affect  Skin: normal``    LABS/DATA:    CARDIAC MARKERS:                                7.5    4.73  )-----------( 241      ( 14 Dec 2019 06:45 )             24.9     12-14    136  |  107  |  16  ----------------------------<  103<H>  3.8   |  21<L>  |  1.06    Ca    7.9<L>      14 Dec 2019 06:45  Phos  2.7     12-13  Mg     1.9     12-13      proBNP:   Lipid Profile:   HgA1c:   TSH:     TELE:  EKG:

## 2019-12-15 NOTE — PROGRESS NOTE ADULT - PROBLEM SELECTOR PLAN 1
from bleeding presumed from radiation cystitis, low grade noninvasive bladder CA diagnosed recently   Hb stable after last transfusion 12/14; urine minimal blood today  - epo tiw

## 2019-12-15 NOTE — PROGRESS NOTE ADULT - SUBJECTIVE AND OBJECTIVE BOX
Nadir Sun Jr, MD  page [phone]  Patient is a 89y old  Male who presents with a chief complaint of Hematuria (15 Dec 2019 07:41)      SUBJECTIVE / OVERNIGHT EVENTS: Pt frustrated with overall course of illness.    MEDICATIONS  (STANDING):  ALBUTerol    90 MICROgram(s) HFA Inhaler 2 Puff(s) Inhalation every 6 hours  amLODIPine   Tablet 5 milliGRAM(s) Oral daily  atorvastatin 20 milliGRAM(s) Oral at bedtime  belladonna 16.2 mG/opium 30 mg Suppository 1 Suppository(s) Rectal every 8 hours  calcium carbonate   1250 mG (OsCal) 1 Tablet(s) Oral daily  cyanocobalamin 1000 MICROGram(s) Oral daily  epoetin gianfranco Injectable 76806 Unit(s) SubCutaneous <User Schedule>  ferrous    sulfate 325 milliGRAM(s) Oral daily  finasteride 5 milliGRAM(s) Oral daily  folic acid 1 milliGRAM(s) Oral daily  heparin  Injectable 5000 Unit(s) SubCutaneous every 8 hours  HYDROmorphone  Injectable 0.5 milliGRAM(s) IV Push once  influenza   Vaccine 0.5 milliLiter(s) IntraMuscular once  lidocaine 2% Jelly 20 milliLiter(s) IntraUrethral once  polyethylene glycol 3350 17 Gram(s) Oral daily  senna 2 Tablet(s) Oral at bedtime  sodium chloride 0.9% lock flush 3 milliLiter(s) IV Push every 8 hours  tiotropium 18 MICROgram(s) Capsule 1 Capsule(s) Inhalation daily    MEDICATIONS  (PRN):  ondansetron Injectable 4 milliGRAM(s) IV Push once PRN Nausea and/or Vomiting  oxyCODONE    IR 5 milliGRAM(s) Oral every 4 hours PRN Severe Pain (7 - 10)      Vital Signs Last 24 Hrs  T(C): 37.1 (15 Dec 2019 14:01), Max: 37.1 (15 Dec 2019 14:01)  T(F): 98.8 (15 Dec 2019 14:01), Max: 98.8 (15 Dec 2019 14:01)  HR: 80 (15 Dec 2019 14:01) (69 - 80)  BP: 133/56 (15 Dec 2019 14:01) (119/43 - 144/50)  BP(mean): --  RR: 18 (15 Dec 2019 14:01) (16 - 18)  SpO2: 98% (15 Dec 2019 14:01) (96% - 98%)  CAPILLARY BLOOD GLUCOSE        I&O's Summary    15 Dec 2019 07:01  -  15 Dec 2019 15:29  --------------------------------------------------------  IN: 0 mL / OUT: 275 mL / NET: -275 mL        PHYSICAL EXAM:  CONSTITUTIONAL: elderly male, sitting up in bed, appearing comfortable  EYES: Conjunctiva/sclera clear  RESPIRATORY: trace bibasilar crackles, no wheezing, normal respiratory effort  CARDIOVASCULAR: Regular rate and rhythm, normal S1 and S2, +systolic murmur; Peripheral pulses are 2+ bilaterally, no significant LE edema bilaterally  ABDOMEN: Nontender to palpation, normoactive bowel sounds, no rebound/guarding; No hepatosplenomegaly  PSYCH: calm  NEURO: Expressive aphasia, comprehension appears intact, nodding appropriately, minimally conversive      LABS:                        8.9    4.44  )-----------( 270      ( 15 Dec 2019 06:40 )             28.9     12-15    137  |  107  |  13  ----------------------------<  108<H>  3.9   |  21<L>  |  1.07    Ca    8.1<L>      15 Dec 2019 06:40      PT/INR - ( 15 Dec 2019 06:40 )   PT: 11.4 SEC;   INR: 1.00          PTT - ( 15 Dec 2019 06:40 )  PTT:27.2 SEC  Care Discussed with Consultants/Other Providers: urology

## 2019-12-15 NOTE — PROGRESS NOTE ADULT - ASSESSMENT
89 yo M admitted with gross hematuria likely 2/2 radiation cystitis, less likely due to hx low grade noninvasive bladder cancer (resected) transferred from OSH; was there 3 weeks;  had hyperbaric O2 treatment; went to OR; alum x2 CBI 11/26-11/27. 11/28 - NS CBI.; still bleeding, flushed yesterday for large clots; OR cancelled 12/4, needs cards w/u as per anesthesia, cardiology recs in chart, 12/5 pt underwent cysto, clot evac (300cc), fulguration, instillation of formalin, CBI light pink 12/6, weaned, remains light pink, no manual irrigations required 12/7, clamped 12/9, however became hematuric again, required manual irrigation for approx 200cc clot, Alum CBI restarted on moderate drip, this am alum CBI stopped draining, irrigated, will switch to NS CBI and obtain CT cystogram, CT cystogram no extrav, to restart alum 12/12, 12/13 beach unable to be flushed, removed and new 24 fr 3 way placed, irrigated to clear light punch, sterile water restarted at slow drip, changed to NS CBI 12/13, remains clear with stable HCT. 12/15, CBI clamped this AM.    Plan:  -AM labs reviewed  -CBI clamped, monitor color  -Continue B&Os   -C/w norvasc per cardiology, hold nifedipine  -F/u medicine, cardiology, hemeonc  -OOB with assistance  -Rehab planning

## 2019-12-15 NOTE — PROGRESS NOTE ADULT - ASSESSMENT
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  Has been at [facility] since 10/31 with hematuria received 15 units PRBCs, gone to OR and had 15 hyperbaric oxygen treatments.  Per oncology pt has established diagnosis of low grade noninvasive bladder cancer from [facility] 10/18/19 with Dr. García Best.  Transferred for persistent hematuria, s/p trial of Alum now s/p  cysto, clot evac, fulguration on 12/5/19 and s/p instillation of formalin, and alum.

## 2019-12-15 NOTE — PROGRESS NOTE ADULT - SUBJECTIVE AND OBJECTIVE BOX
Subjective  Seen and examined this AM.  Received 1u PRBC yesterday, Hct today 28.9 from 24.9 yesterday AM. No issues overnight. CBI clamped this AM.      Objective    Vital signs  T(F): , Max: 98.5 (12-14-19 @ 21:07)  HR: 73 (12-15-19 @ 05:31)  BP: 144/50 (12-15-19 @ 05:31)  SpO2: 98% (12-15-19 @ 05:31)  Wt(kg): --    Output       Gen: NAD  Abd: soft, NT, ND  : CBI clamped, clear urine    Labs      12-15 @ 06:40    WBC 4.44  / Hct 28.9  / SCr --       12-14 @ 06:45    WBC 4.73  / Hct 24.9  / SCr 1.06

## 2019-12-16 LAB
ANION GAP SERPL CALC-SCNC: 11 MMO/L — SIGNIFICANT CHANGE UP (ref 7–14)
BUN SERPL-MCNC: 12 MG/DL — SIGNIFICANT CHANGE UP (ref 7–23)
CALCIUM SERPL-MCNC: 8 MG/DL — LOW (ref 8.4–10.5)
CHLORIDE SERPL-SCNC: 101 MMOL/L — SIGNIFICANT CHANGE UP (ref 98–107)
CO2 SERPL-SCNC: 20 MMOL/L — LOW (ref 22–31)
CREAT SERPL-MCNC: 1.01 MG/DL — SIGNIFICANT CHANGE UP (ref 0.5–1.3)
GLUCOSE SERPL-MCNC: 98 MG/DL — SIGNIFICANT CHANGE UP (ref 70–99)
HCT VFR BLD CALC: 29 % — LOW (ref 39–50)
HGB BLD-MCNC: 9.4 G/DL — LOW (ref 13–17)
MAGNESIUM SERPL-MCNC: 1.8 MG/DL — SIGNIFICANT CHANGE UP (ref 1.6–2.6)
MCHC RBC-ENTMCNC: 28.5 PG — SIGNIFICANT CHANGE UP (ref 27–34)
MCHC RBC-ENTMCNC: 32.4 % — SIGNIFICANT CHANGE UP (ref 32–36)
MCV RBC AUTO: 87.9 FL — SIGNIFICANT CHANGE UP (ref 80–100)
NRBC # FLD: 0 K/UL — SIGNIFICANT CHANGE UP (ref 0–0)
PHOSPHATE SERPL-MCNC: 2.5 MG/DL — SIGNIFICANT CHANGE UP (ref 2.5–4.5)
PLATELET # BLD AUTO: 283 K/UL — SIGNIFICANT CHANGE UP (ref 150–400)
PMV BLD: 8.8 FL — SIGNIFICANT CHANGE UP (ref 7–13)
POTASSIUM SERPL-MCNC: 3.9 MMOL/L — SIGNIFICANT CHANGE UP (ref 3.5–5.3)
POTASSIUM SERPL-SCNC: 3.9 MMOL/L — SIGNIFICANT CHANGE UP (ref 3.5–5.3)
RBC # BLD: 3.3 M/UL — LOW (ref 4.2–5.8)
RBC # FLD: 14.7 % — HIGH (ref 10.3–14.5)
SODIUM SERPL-SCNC: 132 MMOL/L — LOW (ref 135–145)
WBC # BLD: 6.29 K/UL — SIGNIFICANT CHANGE UP (ref 3.8–10.5)
WBC # FLD AUTO: 6.29 K/UL — SIGNIFICANT CHANGE UP (ref 3.8–10.5)

## 2019-12-16 PROCEDURE — 99233 SBSQ HOSP IP/OBS HIGH 50: CPT

## 2019-12-16 PROCEDURE — 99231 SBSQ HOSP IP/OBS SF/LOW 25: CPT

## 2019-12-16 RX ORDER — INFLUENZA VIRUS VACCINE 15; 15; 15; 15 UG/.5ML; UG/.5ML; UG/.5ML; UG/.5ML
0.5 SUSPENSION INTRAMUSCULAR ONCE
Refills: 0 | Status: COMPLETED | OUTPATIENT
Start: 2019-12-16 | End: 2019-12-17

## 2019-12-16 RX ORDER — SODIUM CHLORIDE 9 MG/ML
1000 INJECTION INTRAMUSCULAR; INTRAVENOUS; SUBCUTANEOUS
Refills: 0 | Status: DISCONTINUED | OUTPATIENT
Start: 2019-12-16 | End: 2019-12-17

## 2019-12-16 RX ADMIN — ALBUTEROL 2 PUFF(S): 90 AEROSOL, METERED ORAL at 15:19

## 2019-12-16 RX ADMIN — ALBUTEROL 2 PUFF(S): 90 AEROSOL, METERED ORAL at 10:06

## 2019-12-16 RX ADMIN — ALBUTEROL 2 PUFF(S): 90 AEROSOL, METERED ORAL at 05:25

## 2019-12-16 RX ADMIN — Medication 325 MILLIGRAM(S): at 11:33

## 2019-12-16 RX ADMIN — FINASTERIDE 5 MILLIGRAM(S): 5 TABLET, FILM COATED ORAL at 11:33

## 2019-12-16 RX ADMIN — Medication 1 MILLIGRAM(S): at 11:33

## 2019-12-16 RX ADMIN — ATROPA BELLADONNA AND OPIUM 1 SUPPOSITORY(S): 16.2; 6 SUPPOSITORY RECTAL at 15:18

## 2019-12-16 RX ADMIN — Medication 1 TABLET(S): at 11:32

## 2019-12-16 RX ADMIN — HEPARIN SODIUM 5000 UNIT(S): 5000 INJECTION INTRAVENOUS; SUBCUTANEOUS at 05:25

## 2019-12-16 RX ADMIN — TIOTROPIUM BROMIDE 1 CAPSULE(S): 18 CAPSULE ORAL; RESPIRATORY (INHALATION) at 10:06

## 2019-12-16 RX ADMIN — SODIUM CHLORIDE 50 MILLILITER(S): 9 INJECTION INTRAMUSCULAR; INTRAVENOUS; SUBCUTANEOUS at 17:06

## 2019-12-16 RX ADMIN — SODIUM CHLORIDE 3 MILLILITER(S): 9 INJECTION INTRAMUSCULAR; INTRAVENOUS; SUBCUTANEOUS at 13:07

## 2019-12-16 RX ADMIN — SODIUM CHLORIDE 50 MILLILITER(S): 9 INJECTION INTRAMUSCULAR; INTRAVENOUS; SUBCUTANEOUS at 22:25

## 2019-12-16 RX ADMIN — ATORVASTATIN CALCIUM 20 MILLIGRAM(S): 80 TABLET, FILM COATED ORAL at 22:30

## 2019-12-16 RX ADMIN — AMLODIPINE BESYLATE 5 MILLIGRAM(S): 2.5 TABLET ORAL at 05:25

## 2019-12-16 RX ADMIN — ERYTHROPOIETIN 10000 UNIT(S): 10000 INJECTION, SOLUTION INTRAVENOUS; SUBCUTANEOUS at 10:05

## 2019-12-16 RX ADMIN — ATROPA BELLADONNA AND OPIUM 1 SUPPOSITORY(S): 16.2; 6 SUPPOSITORY RECTAL at 05:25

## 2019-12-16 RX ADMIN — ALBUTEROL 2 PUFF(S): 90 AEROSOL, METERED ORAL at 22:30

## 2019-12-16 RX ADMIN — PREGABALIN 1000 MICROGRAM(S): 225 CAPSULE ORAL at 11:33

## 2019-12-16 RX ADMIN — ATROPA BELLADONNA AND OPIUM 1 SUPPOSITORY(S): 16.2; 6 SUPPOSITORY RECTAL at 22:25

## 2019-12-16 RX ADMIN — SODIUM CHLORIDE 3 MILLILITER(S): 9 INJECTION INTRAMUSCULAR; INTRAVENOUS; SUBCUTANEOUS at 22:21

## 2019-12-16 RX ADMIN — SODIUM CHLORIDE 3 MILLILITER(S): 9 INJECTION INTRAMUSCULAR; INTRAVENOUS; SUBCUTANEOUS at 05:26

## 2019-12-16 RX ADMIN — HEPARIN SODIUM 5000 UNIT(S): 5000 INJECTION INTRAVENOUS; SUBCUTANEOUS at 22:30

## 2019-12-16 RX ADMIN — HEPARIN SODIUM 5000 UNIT(S): 5000 INJECTION INTRAVENOUS; SUBCUTANEOUS at 13:07

## 2019-12-16 NOTE — PROGRESS NOTE ADULT - ASSESSMENT
CAD history of cabg, stent   on statin   off asa due to anemia and hematuria   resume in future once deemed safe     murmur  mild to mod MR, mild to mod AS  stable     HTN  stable   cont current meds     Anemia  Monitor hemoglobin, transfuse as needed.

## 2019-12-16 NOTE — PROGRESS NOTE ADULT - PROBLEM SELECTOR PLAN 8
As per prior documentation, discussion of ACP has been underway.  Copy of MOLST given to pt/wife to review with family  No family bedside currently

## 2019-12-16 NOTE — PROGRESS NOTE ADULT - SUBJECTIVE AND OBJECTIVE BOX
Afeb 142/47 76 97%RA    Pt alert and cooperative;  has no c/o  Abd- soft NT ND   CBI off; beach clear 0999

## 2019-12-16 NOTE — PROGRESS NOTE ADULT - ASSESSMENT
89 yo M admitted with gross hematuria likely 2/2 radiation cystitis, less likely due to hx low grade noninvasive bladder cancer (resected) transferred from OSH; was there 3 weeks;  had hyperbaric O2 treatment; went to OR; alum x2 CBI 11/26-11/27. 11/28 - NS CBI.; still bleeding, flushed yesterday for large clots; OR cancelled 12/4, needs cards w/u as per anesthesia, cardiology recs in chart, 12/5 pt underwent cysto, clot evac (300cc), fulguration, instillation of formalin, CBI light pink 12/6, weaned, remains light pink, no manual irrigations required 12/7, clamped 12/9, however became hematuric again, required manual irrigation for approx 200cc clot, Alum CBI restarted on moderate drip, this am alum CBI stopped draining, irrigated, will switch to NS CBI and obtain CT cystogram, CT cystogram no extrav, to restart alum 12/12, 12/13 beach unable to be flushed, removed and new 24 fr 3 way placed, irrigated to clear light punch, sterile water restarted at slow drip, changed to NS CBI 12/13, remains clear with stable HCT.     Plan:  -AM labs   -CBI clamped, monitor color  -Continue B&Os   -C/w norvasc per cardiology, hold nifedipine  -F/u medicine, cardiology, hemeonc  -OOB with assistance  -Rehab planning

## 2019-12-16 NOTE — PROGRESS NOTE ADULT - PROBLEM SELECTOR PLAN 1
Secondary to Hematuria presumed 2/2 radiation cystitis with low grade noninvasive bladder CA diagnosed recently   Last transfusion 12/14; urine minimal blood today, h/h stable  c/w epogen

## 2019-12-16 NOTE — PROGRESS NOTE ADULT - SUBJECTIVE AND OBJECTIVE BOX
Patient is a 89y old  Male who presents with a chief complaint of Hematuria (16 Dec 2019 11:19)      SUBJECTIVE / OVERNIGHT EVENTS:  Patient seen and examined.  No acute events overnight.  No current complaints.     MEDICATIONS  (STANDING):  ALBUTerol    90 MICROgram(s) HFA Inhaler 2 Puff(s) Inhalation every 6 hours  amLODIPine   Tablet 5 milliGRAM(s) Oral daily  atorvastatin 20 milliGRAM(s) Oral at bedtime  belladonna 16.2 mG/opium 30 mg Suppository 1 Suppository(s) Rectal every 8 hours  calcium carbonate   1250 mG (OsCal) 1 Tablet(s) Oral daily  cyanocobalamin 1000 MICROGram(s) Oral daily  epoetin gianfranco Injectable 51145 Unit(s) SubCutaneous <User Schedule>  ferrous    sulfate 325 milliGRAM(s) Oral daily  finasteride 5 milliGRAM(s) Oral daily  folic acid 1 milliGRAM(s) Oral daily  heparin  Injectable 5000 Unit(s) SubCutaneous every 8 hours  HYDROmorphone  Injectable 0.5 milliGRAM(s) IV Push once  influenza  Vaccine (HIGH DOSE) 0.5 milliLiter(s) IntraMuscular once  lidocaine 2% Jelly 20 milliLiter(s) IntraUrethral once  polyethylene glycol 3350 17 Gram(s) Oral daily  senna 2 Tablet(s) Oral at bedtime  sodium chloride 0.9% lock flush 3 milliLiter(s) IV Push every 8 hours  sodium chloride 0.9%. 1000 milliLiter(s) (50 mL/Hr) IV Continuous <Continuous>  tiotropium 18 MICROgram(s) Capsule 1 Capsule(s) Inhalation daily    MEDICATIONS  (PRN):  ondansetron Injectable 4 milliGRAM(s) IV Push once PRN Nausea and/or Vomiting  oxyCODONE    IR 5 milliGRAM(s) Oral every 4 hours PRN Severe Pain (7 - 10)      Vital Signs Last 24 Hrs  T(C): 36.6 (16 Dec 2019 13:13), Max: 36.8 (16 Dec 2019 01:09)  T(F): 97.9 (16 Dec 2019 13:13), Max: 98.3 (16 Dec 2019 01:09)  HR: 80 (16 Dec 2019 13:13) (75 - 80)  BP: 124/44 (16 Dec 2019 13:13) (111/41 - 142/47)  BP(mean): --  RR: 17 (16 Dec 2019 13:13) (16 - 19)  SpO2: 97% (16 Dec 2019 13:13) (95% - 100%)  CAPILLARY BLOOD GLUCOSE        I&O's Summary    15 Dec 2019 07:01  -  16 Dec 2019 07:00  --------------------------------------------------------  IN: 0 mL / OUT: 3350 mL / NET: -3350 mL    16 Dec 2019 07:01  -  16 Dec 2019 17:29  --------------------------------------------------------  IN: 0 mL / OUT: 1000 mL / NET: -1000 mL        PHYSICAL EXAM:  GENERAL: NAD, well-developed  HEENT: Mild ptosis of left eye, conjunctiva and sclera clear  NECK: Supple, No JVD  CHEST/LUNG: Clear to auscultation bilaterally; No wheeze  HEART: Regular rate and rhythm; No murmurs, rubs, or gallops  ABDOMEN: Soft, Nontender, Nondistended; Bowel sounds present  : Hematuria in bag  EXTREMITIES:  2+ Peripheral Pulses, No clubbing, cyanosis, or edema  PSYCH: Calm  NEUROLOGY: Expressive aphasia but responds appropriately. RUE and RLE 4/5 strength. LUE and LLL 5/5. Sensation intact  SKIN: No rashes or lesions      LABS:                        9.4    6.29  )-----------( 283      ( 16 Dec 2019 06:09 )             29.0     12-16    132<L>  |  101  |  12  ----------------------------<  98  3.9   |  20<L>  |  1.01    Ca    8.0<L>      16 Dec 2019 06:09  Phos  2.5     12-16  Mg     1.8     12-16      PT/INR - ( 15 Dec 2019 06:40 )   PT: 11.4 SEC;   INR: 1.00          PTT - ( 15 Dec 2019 06:40 )  PTT:27.2 SEC        Care Discussed with Consultants/Other Providers: GE

## 2019-12-16 NOTE — PROGRESS NOTE ADULT - ASSESSMENT
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  Has been at OSH since 10/31 with hematuria received 15 units PRBCs, s/p OR and 15 hyperbaric oxygen treatments. Per oncology pt has established diagnosis of low grade noninvasive bladder cancer from [facility] 10/18/19 with Dr. García Best.  Transferred for persistent hematuria, s/p trial of Alum now s/p  cysto, clot evac, fulguration on 12/5/19 and s/p instillation of formalin, and alum.

## 2019-12-16 NOTE — PROGRESS NOTE ADULT - SUBJECTIVE AND OBJECTIVE BOX
Subjective: Patient seen and examined. No new events except as noted.     SUBJECTIVE/ROS:  feels ok   No chest pain, dyspnea, palpitation, or dizziness.       MEDICATIONS:  MEDICATIONS  (STANDING):  ALBUTerol    90 MICROgram(s) HFA Inhaler 2 Puff(s) Inhalation every 6 hours  amLODIPine   Tablet 5 milliGRAM(s) Oral daily  atorvastatin 20 milliGRAM(s) Oral at bedtime  belladonna 16.2 mG/opium 30 mg Suppository 1 Suppository(s) Rectal every 8 hours  calcium carbonate   1250 mG (OsCal) 1 Tablet(s) Oral daily  cyanocobalamin 1000 MICROGram(s) Oral daily  epoetin gianfranco Injectable 76963 Unit(s) SubCutaneous <User Schedule>  ferrous    sulfate 325 milliGRAM(s) Oral daily  finasteride 5 milliGRAM(s) Oral daily  folic acid 1 milliGRAM(s) Oral daily  heparin  Injectable 5000 Unit(s) SubCutaneous every 8 hours  HYDROmorphone  Injectable 0.5 milliGRAM(s) IV Push once  influenza   Vaccine 0.5 milliLiter(s) IntraMuscular once  lidocaine 2% Jelly 20 milliLiter(s) IntraUrethral once  polyethylene glycol 3350 17 Gram(s) Oral daily  senna 2 Tablet(s) Oral at bedtime  sodium chloride 0.9% lock flush 3 milliLiter(s) IV Push every 8 hours  tiotropium 18 MICROgram(s) Capsule 1 Capsule(s) Inhalation daily      PHYSICAL EXAM:  T(C): 36.4 (12-16-19 @ 09:46), Max: 37.1 (12-15-19 @ 14:01)  HR: 79 (12-16-19 @ 09:46) (75 - 80)  BP: 111/41 (12-16-19 @ 09:46) (111/41 - 142/47)  RR: 18 (12-16-19 @ 09:46) (16 - 19)  SpO2: 100% (12-16-19 @ 09:46) (95% - 100%)  Wt(kg): --  I&O's Summary    15 Dec 2019 07:01  -  16 Dec 2019 07:00  --------------------------------------------------------  IN: 0 mL / OUT: 3350 mL / NET: -3350 mL    16 Dec 2019 07:01  -  16 Dec 2019 11:19  --------------------------------------------------------  IN: 0 mL / OUT: 400 mL / NET: -400 mL            JVP: Normal  Neck: supple  Lung: clear   CV: S1 S2 , Murmur:  Abd: soft  Ext: No edema  neuro: Awake / alert  Psych: flat affect  Skin: normal``    LABS/DATA:    CARDIAC MARKERS:                                9.4    6.29  )-----------( 283      ( 16 Dec 2019 06:09 )             29.0     12-16    132<L>  |  101  |  12  ----------------------------<  98  3.9   |  20<L>  |  1.01    Ca    8.0<L>      16 Dec 2019 06:09  Phos  2.5     12-16  Mg     1.8     12-16      proBNP:   Lipid Profile:   HgA1c:   TSH:     TELE:  EKG:

## 2019-12-16 NOTE — PROGRESS NOTE ADULT - PROBLEM SELECTOR PLAN 4
asymptomatic.  Not on ASA due to ongoing bleeding  mild to mod MR, mild to mod AS - euvolemic  cardiology following

## 2019-12-16 NOTE — PROGRESS NOTE ADULT - ATTENDING COMMENTS
Advanced care planning was discussed with patient and family.  Risks, benefits and alternatives of the cardiac treatments and medical therapy including procedures were discussed in detail and all questions were answered. Importance of compliance with medical therapy and lifestyle modification to improve cardiovascular health were addressed. Appropriate forms and patient educational materials were reviewed. 30 minutes face to face spent.
2uRBCs yesterday but H/H down today and still requiring fairly brisk CBI  plan for formalin instillation in OR 12/2
H/h stable since Monday and urine clear on very slow drip  wean over weekend and plan on rehab Monday
Patient seen and examined. Agree with potential transfusion today, will attempt to wean CBI by tomorrow.
cystogram negative  continue NS
light red on slowish drip; hand irrigated out some clot last night but H/H stable  continue CBI - hold off formalin for now
urine running pretty clear and H/H fairly stable  run of Alum during the day then switch back to NS
weaned off CBI over the weekend - urine pink  rhab planning
check INR and terat as needed  still requiring transfusions so may need formalin
will D/C with Lal
will restart Alum - may need to back to OR for formalin 4%
plan was for OR today for formalin instillation. However, anesthesia had reservations given his h/o AS. There is no ECHO to document degree which will influence the anesthesia approach in terms od A-line a pressors if neded.  will stop Flomax and obtain Cardiac clearance
SC heparin/venodynes

## 2019-12-16 NOTE — PROGRESS NOTE ADULT - PROBLEM SELECTOR PLAN 6
Cardiology recs appreciated  nifedipine previoulsy transitioned to amlodipine due to low diastolic pressures

## 2019-12-16 NOTE — PROGRESS NOTE ADULT - PROBLEM SELECTOR PLAN 2
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 12/5 cysto, clot evac, fulguration and instillation of formalin and restarted on alum, 12/11/19 CT urogram without evidence of bladder perforation, management per uro

## 2019-12-17 VITALS
TEMPERATURE: 98 F | DIASTOLIC BLOOD PRESSURE: 43 MMHG | OXYGEN SATURATION: 98 % | RESPIRATION RATE: 17 BRPM | SYSTOLIC BLOOD PRESSURE: 120 MMHG | HEART RATE: 77 BPM

## 2019-12-17 DIAGNOSIS — N18.2 CHRONIC KIDNEY DISEASE, STAGE 2 (MILD): ICD-10-CM

## 2019-12-17 PROCEDURE — 99231 SBSQ HOSP IP/OBS SF/LOW 25: CPT

## 2019-12-17 PROCEDURE — 99232 SBSQ HOSP IP/OBS MODERATE 35: CPT

## 2019-12-17 PROCEDURE — 99238 HOSP IP/OBS DSCHRG MGMT 30/<: CPT

## 2019-12-17 RX ORDER — AMLODIPINE BESYLATE 2.5 MG/1
1 TABLET ORAL
Qty: 0 | Refills: 0 | DISCHARGE
Start: 2019-12-17

## 2019-12-17 RX ORDER — NIFEDIPINE 30 MG
1 TABLET, EXTENDED RELEASE 24 HR ORAL
Qty: 0 | Refills: 0 | DISCHARGE

## 2019-12-17 RX ORDER — POLYETHYLENE GLYCOL 3350 17 G/17G
17 POWDER, FOR SOLUTION ORAL
Qty: 0 | Refills: 0 | DISCHARGE
Start: 2019-12-17

## 2019-12-17 RX ADMIN — ALBUTEROL 2 PUFF(S): 90 AEROSOL, METERED ORAL at 10:29

## 2019-12-17 RX ADMIN — SODIUM CHLORIDE 3 MILLILITER(S): 9 INJECTION INTRAMUSCULAR; INTRAVENOUS; SUBCUTANEOUS at 06:41

## 2019-12-17 RX ADMIN — Medication 1 MILLIGRAM(S): at 12:26

## 2019-12-17 RX ADMIN — Medication 325 MILLIGRAM(S): at 12:26

## 2019-12-17 RX ADMIN — HEPARIN SODIUM 5000 UNIT(S): 5000 INJECTION INTRAVENOUS; SUBCUTANEOUS at 06:47

## 2019-12-17 RX ADMIN — Medication 1 TABLET(S): at 12:26

## 2019-12-17 RX ADMIN — SODIUM CHLORIDE 50 MILLILITER(S): 9 INJECTION INTRAMUSCULAR; INTRAVENOUS; SUBCUTANEOUS at 06:46

## 2019-12-17 RX ADMIN — ALBUTEROL 2 PUFF(S): 90 AEROSOL, METERED ORAL at 16:26

## 2019-12-17 RX ADMIN — FINASTERIDE 5 MILLIGRAM(S): 5 TABLET, FILM COATED ORAL at 12:26

## 2019-12-17 RX ADMIN — SODIUM CHLORIDE 50 MILLILITER(S): 9 INJECTION INTRAMUSCULAR; INTRAVENOUS; SUBCUTANEOUS at 10:30

## 2019-12-17 RX ADMIN — ATROPA BELLADONNA AND OPIUM 1 SUPPOSITORY(S): 16.2; 6 SUPPOSITORY RECTAL at 06:47

## 2019-12-17 RX ADMIN — SODIUM CHLORIDE 3 MILLILITER(S): 9 INJECTION INTRAMUSCULAR; INTRAVENOUS; SUBCUTANEOUS at 14:31

## 2019-12-17 RX ADMIN — ALBUTEROL 2 PUFF(S): 90 AEROSOL, METERED ORAL at 06:47

## 2019-12-17 RX ADMIN — PREGABALIN 1000 MICROGRAM(S): 225 CAPSULE ORAL at 12:26

## 2019-12-17 RX ADMIN — HEPARIN SODIUM 5000 UNIT(S): 5000 INJECTION INTRAVENOUS; SUBCUTANEOUS at 14:31

## 2019-12-17 RX ADMIN — AMLODIPINE BESYLATE 5 MILLIGRAM(S): 2.5 TABLET ORAL at 06:47

## 2019-12-17 RX ADMIN — TIOTROPIUM BROMIDE 1 CAPSULE(S): 18 CAPSULE ORAL; RESPIRATORY (INHALATION) at 10:29

## 2019-12-17 RX ADMIN — INFLUENZA VIRUS VACCINE 0.5 MILLILITER(S): 15; 15; 15; 15 SUSPENSION INTRAMUSCULAR at 15:44

## 2019-12-17 NOTE — PROGRESS NOTE ADULT - SUBJECTIVE AND OBJECTIVE BOX
Patient is a 89y old  Male who presents with a chief complaint of Hematuria (17 Dec 2019 07:26)      SUBJECTIVE / OVERNIGHT EVENTS:  Patient seen and examined.  No acute events overnight.  No current complaints.     MEDICATIONS  (STANDING):  ALBUTerol    90 MICROgram(s) HFA Inhaler 2 Puff(s) Inhalation every 6 hours  amLODIPine   Tablet 5 milliGRAM(s) Oral daily  atorvastatin 20 milliGRAM(s) Oral at bedtime  belladonna 16.2 mG/opium 30 mg Suppository 1 Suppository(s) Rectal every 8 hours  calcium carbonate   1250 mG (OsCal) 1 Tablet(s) Oral daily  cyanocobalamin 1000 MICROGram(s) Oral daily  epoetin gianfranco Injectable 83689 Unit(s) SubCutaneous <User Schedule>  ferrous    sulfate 325 milliGRAM(s) Oral daily  finasteride 5 milliGRAM(s) Oral daily  folic acid 1 milliGRAM(s) Oral daily  heparin  Injectable 5000 Unit(s) SubCutaneous every 8 hours  HYDROmorphone  Injectable 0.5 milliGRAM(s) IV Push once  influenza  Vaccine (HIGH DOSE) 0.5 milliLiter(s) IntraMuscular once  lidocaine 2% Jelly 20 milliLiter(s) IntraUrethral once  polyethylene glycol 3350 17 Gram(s) Oral daily  senna 2 Tablet(s) Oral at bedtime  sodium chloride 0.9% lock flush 3 milliLiter(s) IV Push every 8 hours  sodium chloride 0.9%. 1000 milliLiter(s) (50 mL/Hr) IV Continuous <Continuous>  tiotropium 18 MICROgram(s) Capsule 1 Capsule(s) Inhalation daily    MEDICATIONS  (PRN):  ondansetron Injectable 4 milliGRAM(s) IV Push once PRN Nausea and/or Vomiting  oxyCODONE    IR 5 milliGRAM(s) Oral every 4 hours PRN Severe Pain (7 - 10)      Vital Signs Last 24 Hrs  T(C): 36.5 (17 Dec 2019 14:28), Max: 36.8 (17 Dec 2019 01:23)  T(F): 97.7 (17 Dec 2019 14:28), Max: 98.2 (17 Dec 2019 01:23)  HR: 77 (17 Dec 2019 14:28) (72 - 77)  BP: 120/43 (17 Dec 2019 14:28) (116/53 - 137/51)  BP(mean): --  RR: 17 (17 Dec 2019 14:28) (16 - 17)  SpO2: 98% (17 Dec 2019 14:28) (96% - 99%)  CAPILLARY BLOOD GLUCOSE        I&O's Summary    16 Dec 2019 07:01  -  17 Dec 2019 07:00  --------------------------------------------------------  IN: 0 mL / OUT: 3000 mL / NET: -3000 mL    17 Dec 2019 07:01  -  17 Dec 2019 14:44  --------------------------------------------------------  IN: 0 mL / OUT: 350 mL / NET: -350 mL        PHYSICAL EXAM:  GENERAL: NAD, well-developed  HEENT: Mild ptosis of left eye, conjunctiva and sclera clear  NECK: Supple, No JVD  CHEST/LUNG: Clear to auscultation bilaterally; No wheeze  HEART: Regular rate and rhythm; No murmurs, rubs, or gallops  ABDOMEN: Soft, Nontender, Nondistended; Bowel sounds present  : Yellow urine in beach collection bag  EXTREMITIES:  2+ Peripheral Pulses, No clubbing, cyanosis, or edema  PSYCH: Calm  NEUROLOGY: Expressive aphasia but responds appropriately. RUE and RLE 4/5 strength. LUE and LLL 5/5. Sensation intact  SKIN: No rashes or lesions    LABS:                        9.4    6.29  )-----------( 283      ( 16 Dec 2019 06:09 )             29.0     12-16    132<L>  |  101  |  12  ----------------------------<  98  3.9   |  20<L>  |  1.01    Ca    8.0<L>      16 Dec 2019 06:09  Phos  2.5     12-16  Mg     1.8     12-16      Care Discussed with Consultants/Other Providers:

## 2019-12-17 NOTE — PROGRESS NOTE ADULT - ASSESSMENT
CAD history of cabg, stent   on statin   off asa due to anemia and hematuria   resume in future once deemed safe     mild to mod MR, mild to mod AS  stable     HTN  stable   cont current meds     dc planning as per primary team denies

## 2019-12-17 NOTE — PROGRESS NOTE ADULT - ASSESSMENT
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  Has been at OSH since 10/31 with hematuria received 15 units PRBCs, s/p OR and 15 hyperbaric oxygen treatments. Per oncology pt has established diagnosis of low grade noninvasive bladder cancer from [facility] 10/18/19 with Dr. García Best.  Transferred for persistent hematuria, s/p trial of Alum now s/p  cysto, clot evac, fulguration on 12/5/19 and s/p instillation of formalin, and alum.

## 2019-12-17 NOTE — PROGRESS NOTE ADULT - ASSESSMENT
89 yo M admitted with gross hematuria likely 2/2 radiation cystitis, less likely due to hx low grade noninvasive bladder cancer (resected) transferred from OSH; was there 3 weeks;  had hyperbaric O2 treatment; went to OR; alum x2 CBI 11/26-11/27. 11/28 - NS CBI.; still bleeding, flushed yesterday for large clots; OR cancelled 12/4, needs cards w/u as per anesthesia, cardiology recs in chart, 12/5 pt underwent cysto, clot evac (300cc), fulguration, instillation of formalin, CBI light pink 12/6, weaned, remains light pink, no manual irrigations required 12/7, clamped 12/9, however became hematuric again, required manual irrigation for approx 200cc clot, Alum CBI restarted on moderate drip, this am alum CBI stopped draining, irrigated, will switch to NS CBI and obtain CT cystogram, CT cystogram no extrav, to restart alum 12/12, 12/13 beach unable to be flushed, removed and new 24 fr 3 way placed, irrigated to clear light punch, sterile water restarted at slow drip, changed to NS CBI 12/13, remains clear with stable HCT. Off CBI 2 days remains clear; crit stable    Plan:  -Rehab planning with beach Plan:  -Rehab planning with yong

## 2019-12-17 NOTE — PROGRESS NOTE ADULT - REASON FOR ADMISSION
Hematuria

## 2019-12-17 NOTE — PROGRESS NOTE ADULT - PROBLEM SELECTOR PLAN 8
As per prior documentation, discussion of ACP has ongoing.  Copy of MOLST given to pt/wife to review with family  d/c planning per

## 2019-12-17 NOTE — PROGRESS NOTE ADULT - SUBJECTIVE AND OBJECTIVE BOX
Subjective: Patient seen and examined. No new events except as noted.     SUBJECTIVE/ROS:        MEDICATIONS:  MEDICATIONS  (STANDING):  ALBUTerol    90 MICROgram(s) HFA Inhaler 2 Puff(s) Inhalation every 6 hours  amLODIPine   Tablet 5 milliGRAM(s) Oral daily  atorvastatin 20 milliGRAM(s) Oral at bedtime  belladonna 16.2 mG/opium 30 mg Suppository 1 Suppository(s) Rectal every 8 hours  calcium carbonate   1250 mG (OsCal) 1 Tablet(s) Oral daily  cyanocobalamin 1000 MICROGram(s) Oral daily  epoetin gianfranco Injectable 71954 Unit(s) SubCutaneous <User Schedule>  ferrous    sulfate 325 milliGRAM(s) Oral daily  finasteride 5 milliGRAM(s) Oral daily  folic acid 1 milliGRAM(s) Oral daily  heparin  Injectable 5000 Unit(s) SubCutaneous every 8 hours  HYDROmorphone  Injectable 0.5 milliGRAM(s) IV Push once  influenza  Vaccine (HIGH DOSE) 0.5 milliLiter(s) IntraMuscular once  lidocaine 2% Jelly 20 milliLiter(s) IntraUrethral once  polyethylene glycol 3350 17 Gram(s) Oral daily  senna 2 Tablet(s) Oral at bedtime  sodium chloride 0.9% lock flush 3 milliLiter(s) IV Push every 8 hours  sodium chloride 0.9%. 1000 milliLiter(s) (50 mL/Hr) IV Continuous <Continuous>  tiotropium 18 MICROgram(s) Capsule 1 Capsule(s) Inhalation daily      PHYSICAL EXAM:  T(C): 36.6 (12-17-19 @ 06:39), Max: 36.8 (12-17-19 @ 01:23)  HR: 72 (12-17-19 @ 06:39) (72 - 80)  BP: 137/51 (12-17-19 @ 06:39) (111/41 - 137/51)  RR: 16 (12-17-19 @ 06:39) (16 - 18)  SpO2: 97% (12-17-19 @ 06:39) (96% - 100%)  Wt(kg): --  I&O's Summary    16 Dec 2019 07:01  -  17 Dec 2019 07:00  --------------------------------------------------------  IN: 0 mL / OUT: 3000 mL / NET: -3000 mL            JVP: Normal  Neck: supple  Lung: clear   CV: S1 S2 , Murmur:  Abd: soft  Ext: No edema  neuro: Awake / alert  Psych: flat affect  Skin: normal``    LABS/DATA:    CARDIAC MARKERS:                                9.4    6.29  )-----------( 283      ( 16 Dec 2019 06:09 )             29.0     12-16    132<L>  |  101  |  12  ----------------------------<  98  3.9   |  20<L>  |  1.01    Ca    8.0<L>      16 Dec 2019 06:09  Phos  2.5     12-16  Mg     1.8     12-16      proBNP:   Lipid Profile:   HgA1c:   TSH:     TELE:  EKG:

## 2019-12-17 NOTE — PROGRESS NOTE ADULT - PROBLEM SELECTOR PLAN 4
asymptomatic.  Not on ASA due to ongoing bleeding - d/w patient and family bedside = off since July  mild to mod MR, mild to mod AS - euvolemic  cardiology following

## 2019-12-17 NOTE — PROGRESS NOTE ADULT - PROBLEM SELECTOR PLAN 6
Cardiology recs appreciated  nifedipine previoulsy transitioned to amlodipine due to low diastolic pressures - bp acceptable on current regimen

## 2019-12-17 NOTE — PROGRESS NOTE ADULT - PROBLEM SELECTOR PLAN 2
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 12/5 cysto, clot evac, fulguration and instillation of formalin and restarted on alum, 12/11/19 CT urogram without evidence of bladder perforation, management per uro  Yellow urine today

## 2020-01-09 ENCOUNTER — APPOINTMENT (OUTPATIENT)
Dept: ULTRASOUND IMAGING | Facility: CLINIC | Age: 85
End: 2020-01-09
Payer: MEDICARE

## 2020-01-09 PROBLEM — N18.9 CHRONIC KIDNEY DISEASE, UNSPECIFIED: Chronic | Status: ACTIVE | Noted: 2019-11-25

## 2020-01-09 PROBLEM — C61 MALIGNANT NEOPLASM OF PROSTATE: Chronic | Status: ACTIVE | Noted: 2019-11-25

## 2020-01-09 PROBLEM — N17.9 ACUTE KIDNEY FAILURE, UNSPECIFIED: Chronic | Status: ACTIVE | Noted: 2019-11-25

## 2020-01-09 PROBLEM — I25.10 ATHEROSCLEROTIC HEART DISEASE OF NATIVE CORONARY ARTERY WITHOUT ANGINA PECTORIS: Chronic | Status: ACTIVE | Noted: 2019-11-25

## 2020-01-09 PROCEDURE — 93970 EXTREMITY STUDY: CPT

## 2020-02-07 ENCOUNTER — INPATIENT (INPATIENT)
Facility: HOSPITAL | Age: 85
LOS: 0 days | Discharge: HOME | End: 2020-02-08
Payer: MEDICARE

## 2020-02-07 ENCOUNTER — OUTPATIENT (OUTPATIENT)
Dept: OUTPATIENT SERVICES | Facility: HOSPITAL | Age: 85
LOS: 1 days | End: 2020-02-07

## 2020-02-07 PROCEDURE — 71045 X-RAY EXAM CHEST 1 VIEW: CPT | Mod: 26

## 2020-02-07 PROCEDURE — 70450 CT HEAD/BRAIN W/O DYE: CPT | Mod: 26,77

## 2020-02-07 PROCEDURE — 72170 X-RAY EXAM OF PELVIS: CPT | Mod: 26

## 2020-02-07 PROCEDURE — 73030 X-RAY EXAM OF SHOULDER: CPT | Mod: 26,RT

## 2020-02-07 PROCEDURE — 70450 CT HEAD/BRAIN W/O DYE: CPT | Mod: 26

## 2020-02-07 PROCEDURE — 99285 EMERGENCY DEPT VISIT HI MDM: CPT

## 2020-02-07 PROCEDURE — 73130 X-RAY EXAM OF HAND: CPT | Mod: 26,RT

## 2020-02-07 PROCEDURE — 72125 CT NECK SPINE W/O DYE: CPT | Mod: 26

## 2020-02-07 PROCEDURE — 73110 X-RAY EXAM OF WRIST: CPT | Mod: 26,RT

## 2020-02-07 PROCEDURE — 73060 X-RAY EXAM OF HUMERUS: CPT | Mod: 26,RT

## 2020-02-08 ENCOUNTER — OUTPATIENT (OUTPATIENT)
Dept: OUTPATIENT SERVICES | Facility: HOSPITAL | Age: 85
LOS: 1 days | End: 2020-02-08

## 2020-02-08 PROCEDURE — 70551 MRI BRAIN STEM W/O DYE: CPT | Mod: 26

## 2020-02-21 NOTE — PROVIDER CONTACT NOTE (CRITICAL VALUE NOTIFICATION) - ACTION/TREATMENT ORDERED:
Discharge note: Patient has been seen by doctor. Discharge order obtained, and discharge instructions reviewed. Patient educated, using the teach back method, about follow up instructions and discharge instructions. A completed copy of the AVS instructions given to patient and all questions answered. IV catheters X2 removed without complaints, catheters intact, sites WNL. Tele removed. Discharged to lobby via wheel chair with documented belongings. Pt's family member present at discharge and will be driving pt to private residence.  Electronically signed by Allie Herman RN on 2/21/2020 at 5:18 PM
2 Units PRBCs ordered. Blood Bank notified, units ordered
Repeat CBC stat as per PA order

## 2020-02-25 NOTE — PROGRESS NOTE ADULT - SUBJECTIVE AND OBJECTIVE BOX
Patient requested appointment through [facility] Web Form. Writer offered date and time for new consult, patient has accepted. Office contact information was provided.    Subjective: Patient seen and examined. No new events except as noted.     SUBJECTIVE/ROS:  feels fatigue  no cp or sob       MEDICATIONS:  MEDICATIONS  (STANDING):  ALBUTerol    90 MICROgram(s) HFA Inhaler 2 Puff(s) Inhalation every 6 hours  atorvastatin 20 milliGRAM(s) Oral at bedtime  calcium carbonate   1250 mG (OsCal) 1 Tablet(s) Oral daily  cyanocobalamin 1000 MICROGram(s) Oral daily  epoetin gianfranco Injectable 85081 Unit(s) SubCutaneous <User Schedule>  ferrous    sulfate 325 milliGRAM(s) Oral daily  finasteride 5 milliGRAM(s) Oral daily  folic acid 1 milliGRAM(s) Oral daily  heparin  Injectable 5000 Unit(s) SubCutaneous every 8 hours  influenza   Vaccine 0.5 milliLiter(s) IntraMuscular once  NIFEdipine XL 30 milliGRAM(s) Oral daily  polyethylene glycol 3350 17 Gram(s) Oral daily  senna 2 Tablet(s) Oral at bedtime  sodium chloride 0.9% lock flush 3 milliLiter(s) IV Push every 8 hours  tiotropium 18 MICROgram(s) Capsule 1 Capsule(s) Inhalation daily      PHYSICAL EXAM:  T(C): 36.8 (12-10-19 @ 09:25), Max: 37.1 (12-09-19 @ 21:51)  HR: 74 (12-10-19 @ 09:25) (74 - 89)  BP: 122/48 (12-10-19 @ 09:25) (122/48 - 139/51)  RR: 17 (12-10-19 @ 09:25) (16 - 17)  SpO2: 96% (12-10-19 @ 09:25) (95% - 100%)  Wt(kg): --  I&O's Summary    09 Dec 2019 07:01  -  10 Dec 2019 07:00  --------------------------------------------------------  IN: 95 mL / OUT: 2450 mL / NET: -2355 mL            JVP: Normal  Neck: supple  Lung: clear   CV: S1 S2 , Murmur:  Abd: soft  Ext: No edema  neuro: Awake / alert  Psych: flat affect  Skin: normal``    LABS/DATA:    CARDIAC MARKERS:                                8.4    5.98  )-----------( 295      ( 10 Dec 2019 06:31 )             27.5     12-10    135  |  104  |  19  ----------------------------<  103<H>  3.7   |  22  |  1.14    Ca    7.8<L>      10 Dec 2019 06:31      proBNP:   Lipid Profile:   HgA1c:   TSH:     TELE:  EKG:

## 2020-09-14 ENCOUNTER — OUTPATIENT (OUTPATIENT)
Dept: OUTPATIENT SERVICES | Facility: HOSPITAL | Age: 85
LOS: 1 days | End: 2020-09-14

## 2020-10-05 NOTE — DIETITIAN INITIAL EVALUATION ADULT. - OTHER INFO
No
Pt's wife was bedside and provided information. Pt's appetite is fair to good. He eats better when he is given food he likes. Pt is able to feed himself after being set up. Pt has a sacral pressure injury and pressure injury on his right and left buttock.. Suggest starting Pt on 1 packet of Pro Source/day to help heal it. Pt has severe muscle wasting in clavicle area and severe fat wasting in buccal area. Both are indicators of severe malnutrition. Pt has no difficulty chewing or swallowing nor complaints of GI distress.

## 2021-04-30 ENCOUNTER — OUTPATIENT (OUTPATIENT)
Dept: OUTPATIENT SERVICES | Facility: HOSPITAL | Age: 86
LOS: 1 days | End: 2021-04-30

## 2021-05-07 ENCOUNTER — OUTPATIENT (OUTPATIENT)
Dept: OUTPATIENT SERVICES | Facility: HOSPITAL | Age: 86
LOS: 1 days | End: 2021-05-07

## 2021-06-02 NOTE — CONSULT NOTE ADULT - PROBLEM SELECTOR PROBLEM 3
Chronic kidney disease, unspecified CKD stage Methotrexate Counseling:  Patient counseled regarding adverse effects of methotrexate including but not limited to nausea, vomiting, abnormalities in liver function tests. Patients may develop mouth sores, rash, diarrhea, and abnormalities in blood counts. The patient understands that monitoring is required including LFT's and blood counts.  There is a rare possibility of scarring of the liver and lung problems that can occur when taking methotrexate. Persistent nausea, loss of appetite, pale stools, dark urine, cough, and shortness of breath should be reported immediately. Patient advised to discontinue methotrexate treatment at least three months before attempting to become pregnant.  I discussed the need for folate supplements while taking methotrexate.  These supplements can decrease side effects during methotrexate treatment. The patient verbalized understanding of the proper use and possible adverse effects of methotrexate.  All of the patient's questions and concerns were addressed.

## 2021-07-26 ENCOUNTER — NON-APPOINTMENT (OUTPATIENT)
Age: 86
End: 2021-07-26

## 2021-07-27 ENCOUNTER — NON-APPOINTMENT (OUTPATIENT)
Age: 86
End: 2021-07-27

## 2021-07-27 ENCOUNTER — APPOINTMENT (OUTPATIENT)
Dept: UROLOGY | Facility: CLINIC | Age: 86
End: 2021-07-27
Payer: MEDICARE

## 2021-07-27 VITALS
TEMPERATURE: 96.9 F | BODY MASS INDEX: 17.03 KG/M2 | WEIGHT: 115 LBS | HEART RATE: 89 BPM | DIASTOLIC BLOOD PRESSURE: 65 MMHG | SYSTOLIC BLOOD PRESSURE: 178 MMHG | HEIGHT: 69 IN

## 2021-07-27 PROCEDURE — 99214 OFFICE O/P EST MOD 30 MIN: CPT

## 2021-07-27 NOTE — ASSESSMENT
[FreeTextEntry1] : Plan\par UA\par culture\par CBC\par BMP\par PSA\par CT urogram\par pt will need cystoscopy\par fu 1 week

## 2021-07-27 NOTE — LETTER BODY
[Dear  ___] : Dear  [unfilled], [Consult Letter:] : I had the pleasure of evaluating your patient, [unfilled]. [Please see my note below.] : Please see my note below. [Consult Closing:] : Thank you very much for allowing me to participate in the care of this patient.  If you have any questions, please do not hesitate to contact me. [Sincerely,] : Sincerely, [FreeTextEntry3] : Francisco Roberts, DO\par Genitourinary Medicine\par

## 2021-07-27 NOTE — HISTORY OF PRESENT ILLNESS
[FreeTextEntry1] : Mr. DIPAK LEBLANC 89 year old  M  PMH gross hematuria in the past after having radiation of the prostate pt has required intravesicular Alum and formalin in 2019 for his hematuria. Pt also treated with hyperbaric oxygen 2 years ago. Blood in the urine today started about 2 months ago. He denies any pain and feels he is urinating well.  Pt has seen Dr. Carroll in the past and would like to go back and see him.

## 2021-07-29 ENCOUNTER — APPOINTMENT (OUTPATIENT)
Dept: CT IMAGING | Facility: CLINIC | Age: 86
End: 2021-07-29
Payer: MEDICARE

## 2021-07-29 LAB
APPEARANCE: ABNORMAL
BACTERIA UR CULT: NORMAL
BACTERIA: NEGATIVE
BILIRUBIN URINE: ABNORMAL
BLOOD URINE: ABNORMAL
COLOR: ABNORMAL
GLUCOSE QUALITATIVE U: NEGATIVE
HYALINE CASTS: 2 /LPF
KETONES URINE: NEGATIVE
LEUKOCYTE ESTERASE URINE: ABNORMAL
MICROSCOPIC-UA: NORMAL
NITRITE URINE: NEGATIVE
PH URINE: 6
PROTEIN URINE: ABNORMAL
RED BLOOD CELLS URINE: >720 /HPF
SPECIFIC GRAVITY URINE: 1.02
SQUAMOUS EPITHELIAL CELLS: 0 /HPF
URINE COMMENTS: NORMAL
UROBILINOGEN URINE: NORMAL
WHITE BLOOD CELLS URINE: 46 /HPF

## 2021-07-29 PROCEDURE — 74178 CT ABD&PLV WO CNTR FLWD CNTR: CPT | Mod: MH

## 2021-07-29 PROCEDURE — 82565 ASSAY OF CREATININE: CPT | Mod: QW

## 2021-08-02 ENCOUNTER — APPOINTMENT (OUTPATIENT)
Dept: UROLOGY | Facility: CLINIC | Age: 86
End: 2021-08-02
Payer: MEDICARE

## 2021-08-02 VITALS
BODY MASS INDEX: 17.03 KG/M2 | HEIGHT: 69 IN | SYSTOLIC BLOOD PRESSURE: 145 MMHG | WEIGHT: 115 LBS | DIASTOLIC BLOOD PRESSURE: 65 MMHG | TEMPERATURE: 97.6 F | HEART RATE: 96 BPM

## 2021-08-02 PROCEDURE — 99214 OFFICE O/P EST MOD 30 MIN: CPT

## 2021-08-02 NOTE — PHYSICAL EXAM
[General Appearance - Well Developed] : well developed [General Appearance - Well Nourished] : well nourished [Normal Appearance] : normal appearance [Well Groomed] : well groomed [General Appearance - In No Acute Distress] : no acute distress [Abdomen Soft] : soft [Abdomen Tenderness] : non-tender [Costovertebral Angle Tenderness] : no ~M costovertebral angle tenderness [Urethral Meatus] : meatus normal [Urinary Bladder Findings] : the bladder was normal on palpation [Scrotum] : the scrotum was normal [Testes Mass (___cm)] : there were no testicular masses [No Prostate Nodules] : no prostate nodules [Edema] : no peripheral edema [] : no respiratory distress [Respiration, Rhythm And Depth] : normal respiratory rhythm and effort [Exaggerated Use Of Accessory Muscles For Inspiration] : no accessory muscle use [Oriented To Time, Place, And Person] : oriented to person, place, and time [Affect] : the affect was normal [Mood] : the mood was normal [Not Anxious] : not anxious [FreeTextEntry1] : Patient after the CVA  [No Palpable Adenopathy] : no palpable adenopathy

## 2021-08-02 NOTE — ASSESSMENT
[FreeTextEntry1] : Patient presented today to discuss the results of the CT scan.  I personally reviewed CT scan and explained to him and to his family that he has no filling defects in the renal collecting system and ureters.  He has 2 benign cystic lesion in both kidney.  His bladder showed defect of 1 1.5 cm in the anterior wall, additional 2 lesions on the right anterolateral wall and the lesion of approximately 2.8 cm in the right posterior lateral wall.\par I explained to the patient that there is no need for the office cystoscopy and we can go straight for the cystoscopy/TURBT in the operating room.  I explained to the patient that because of the big volume of the tumor in the right posterior wall most probably will need to put him to the hospital for 1 night.  I also explained that if the resection will be deep and showed the fat will ask him to be with the Lal catheter for additional days.\par I also explained that the greatest stage of the tumor can be discussed after the pathology results.  Now we have	to order additional x-ray exams but I cannot exclude the need for additional diagnostics after he received the pathology.\par Patient and his family understood.

## 2021-08-02 NOTE — HISTORY OF PRESENT ILLNESS
[FreeTextEntry1] : 82-year-old patient was sent by Dr. Roberts to discuss the results of the CT scan.  Patient does suffer from gross hematuria and was sent for the CT you.  Patient had a history of the prostate cancer that was treated with radiation 24 years ago.  After radiation patient has a radiation cystitis that was treated with the hyperoxygenation and with the intravesical instillation of family because of the hyperoxygenation failure\par Patient also had resection of the skin cancer\par Patient is after CVA.

## 2021-08-06 ENCOUNTER — APPOINTMENT (OUTPATIENT)
Dept: UROLOGY | Facility: CLINIC | Age: 86
End: 2021-08-06
Payer: MEDICARE

## 2021-08-06 DIAGNOSIS — R47.01 APHASIA: ICD-10-CM

## 2021-08-06 DIAGNOSIS — I69.30 UNSPECIFIED SEQUELAE OF CEREBRAL INFARCTION: ICD-10-CM

## 2021-08-06 PROCEDURE — 99214 OFFICE O/P EST MOD 30 MIN: CPT

## 2021-08-07 PROBLEM — R47.01 APHASIA, MIXED: Status: ACTIVE | Noted: 2021-08-07

## 2021-08-07 PROBLEM — I69.30 HISTORY OF CVA WITH RESIDUAL DEFICIT: Status: RESOLVED | Noted: 2021-08-07 | Resolved: 2021-08-07

## 2021-08-07 NOTE — ASSESSMENT
[FreeTextEntry1] : [place] based on prior relationship family wanted opinion of next steps realizing he is 89 with some medical issues.\par based on CT scan the TURBT should be straight forward and therapeutic for the bleeding - may need further therapy in bladder. major concern and question is tolerating anesthesia given his prior CVA and carotid stenosis.\par would await the medical clearance and testing. else could be handles conservatively had have transfusions as needed. Noted the bleeding could be intermittent and go months between episodes.

## 2021-08-07 NOTE — HISTORY OF PRESENT ILLNESS
[FreeTextEntry1] : [age]-year-old patient was sent by Dr. Roberts to discuss the results of the CT scan noting several bladder tumors\par \par i had first seen him 2019 as a hospital transfer for intractable hematuria. 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 Ended up with > 20 u pRBCs and needed formalin after failing Alum.\par Now has experienced intermittent bouts gross hematuria.  I reviewed the CT scan with family & patient noting several bladder tumors, the largest @3cm.  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

## 2021-08-07 NOTE — REASON FOR VISIT
[Follow-up Visit ___] : a follow-up visit  for [unfilled] [Spouse] : spouse [Family Member] : family member [Other: _____] : [unfilled]

## 2021-08-26 ENCOUNTER — OUTPATIENT (OUTPATIENT)
Dept: OUTPATIENT SERVICES | Facility: HOSPITAL | Age: 86
LOS: 1 days | End: 2021-08-26
Payer: MEDICARE

## 2021-08-26 VITALS
OXYGEN SATURATION: 95 % | HEART RATE: 83 BPM | TEMPERATURE: 97 F | SYSTOLIC BLOOD PRESSURE: 5 MMHG | RESPIRATION RATE: 16 BRPM | HEIGHT: 65 IN | DIASTOLIC BLOOD PRESSURE: 5 MMHG | WEIGHT: 115.08 LBS

## 2021-08-26 DIAGNOSIS — C67.9 MALIGNANT NEOPLASM OF BLADDER, UNSPECIFIED: ICD-10-CM

## 2021-08-26 DIAGNOSIS — I10 ESSENTIAL (PRIMARY) HYPERTENSION: ICD-10-CM

## 2021-08-26 DIAGNOSIS — E87.6 HYPOKALEMIA: ICD-10-CM

## 2021-08-26 DIAGNOSIS — Z98.890 OTHER SPECIFIED POSTPROCEDURAL STATES: Chronic | ICD-10-CM

## 2021-08-26 DIAGNOSIS — Z95.1 PRESENCE OF AORTOCORONARY BYPASS GRAFT: Chronic | ICD-10-CM

## 2021-08-26 LAB
ALBUMIN SERPL ELPH-MCNC: 4 G/DL — SIGNIFICANT CHANGE UP (ref 3.3–5)
ALP SERPL-CCNC: 96 U/L — SIGNIFICANT CHANGE UP (ref 40–120)
ALT FLD-CCNC: 21 U/L — SIGNIFICANT CHANGE UP (ref 4–41)
ANION GAP SERPL CALC-SCNC: 14 MMOL/L — SIGNIFICANT CHANGE UP (ref 7–14)
AST SERPL-CCNC: 19 U/L — SIGNIFICANT CHANGE UP (ref 4–40)
BILIRUB SERPL-MCNC: 0.4 MG/DL — SIGNIFICANT CHANGE UP (ref 0.2–1.2)
BUN SERPL-MCNC: 48 MG/DL — HIGH (ref 7–23)
CALCIUM SERPL-MCNC: 9.4 MG/DL — SIGNIFICANT CHANGE UP (ref 8.4–10.5)
CHLORIDE SERPL-SCNC: 102 MMOL/L — SIGNIFICANT CHANGE UP (ref 98–107)
CO2 SERPL-SCNC: 21 MMOL/L — LOW (ref 22–31)
CREAT SERPL-MCNC: 1.39 MG/DL — HIGH (ref 0.5–1.3)
GLUCOSE SERPL-MCNC: 100 MG/DL — HIGH (ref 70–99)
HCT VFR BLD CALC: 27 % — LOW (ref 39–50)
HGB BLD-MCNC: 8.3 G/DL — LOW (ref 13–17)
MCHC RBC-ENTMCNC: 30.7 GM/DL — LOW (ref 32–36)
MCHC RBC-ENTMCNC: 30.9 PG — SIGNIFICANT CHANGE UP (ref 27–34)
MCV RBC AUTO: 100.4 FL — HIGH (ref 80–100)
NRBC # BLD: 0 /100 WBCS — SIGNIFICANT CHANGE UP
NRBC # FLD: 0 K/UL — SIGNIFICANT CHANGE UP
PLATELET # BLD AUTO: 194 K/UL — SIGNIFICANT CHANGE UP (ref 150–400)
POTASSIUM SERPL-MCNC: 3.9 MMOL/L — SIGNIFICANT CHANGE UP (ref 3.5–5.3)
POTASSIUM SERPL-SCNC: 3.9 MMOL/L — SIGNIFICANT CHANGE UP (ref 3.5–5.3)
PROT SERPL-MCNC: 6.3 G/DL — SIGNIFICANT CHANGE UP (ref 6–8.3)
RBC # BLD: 2.69 M/UL — LOW (ref 4.2–5.8)
RBC # FLD: 14.5 % — SIGNIFICANT CHANGE UP (ref 10.3–14.5)
SODIUM SERPL-SCNC: 137 MMOL/L — SIGNIFICANT CHANGE UP (ref 135–145)
WBC # BLD: 5.66 K/UL — SIGNIFICANT CHANGE UP (ref 3.8–10.5)
WBC # FLD AUTO: 5.66 K/UL — SIGNIFICANT CHANGE UP (ref 3.8–10.5)

## 2021-08-26 PROCEDURE — 93010 ELECTROCARDIOGRAM REPORT: CPT

## 2021-08-26 RX ORDER — FINASTERIDE 5 MG/1
1 TABLET, FILM COATED ORAL
Qty: 0 | Refills: 0 | DISCHARGE

## 2021-08-26 RX ORDER — SODIUM CHLORIDE 9 MG/ML
1000 INJECTION, SOLUTION INTRAVENOUS
Refills: 0 | Status: DISCONTINUED | OUTPATIENT
Start: 2021-09-09 | End: 2021-09-23

## 2021-08-26 RX ORDER — ROSUVASTATIN CALCIUM 5 MG/1
1 TABLET ORAL
Qty: 0 | Refills: 0 | DISCHARGE

## 2021-08-26 RX ORDER — ERYTHROPOIETIN 10000 [IU]/ML
1 INJECTION, SOLUTION INTRAVENOUS; SUBCUTANEOUS
Qty: 0 | Refills: 0 | DISCHARGE

## 2021-08-26 RX ORDER — FOLIC ACID 0.8 MG
1 TABLET ORAL
Qty: 0 | Refills: 0 | DISCHARGE

## 2021-08-26 RX ORDER — PREGABALIN 225 MG/1
1 CAPSULE ORAL
Qty: 0 | Refills: 0 | DISCHARGE

## 2021-08-26 RX ORDER — SENNA PLUS 8.6 MG/1
1 TABLET ORAL
Qty: 0 | Refills: 0 | DISCHARGE

## 2021-08-26 RX ORDER — CALCIUM CARBONATE 500(1250)
1 TABLET ORAL
Qty: 0 | Refills: 0 | DISCHARGE

## 2021-08-26 RX ORDER — TAMSULOSIN HYDROCHLORIDE 0.4 MG/1
1 CAPSULE ORAL
Qty: 0 | Refills: 0 | DISCHARGE

## 2021-08-26 RX ORDER — IPRATROPIUM/ALBUTEROL SULFATE 18-103MCG
3 AEROSOL WITH ADAPTER (GRAM) INHALATION
Qty: 0 | Refills: 0 | DISCHARGE

## 2021-08-26 RX ORDER — FERROUS SULFATE 325(65) MG
1 TABLET ORAL
Qty: 0 | Refills: 0 | DISCHARGE

## 2021-08-26 RX ORDER — ASPIRIN/CALCIUM CARB/MAGNESIUM 324 MG
1 TABLET ORAL
Qty: 0 | Refills: 0 | DISCHARGE

## 2021-08-26 RX ORDER — ATORVASTATIN CALCIUM 80 MG/1
1 TABLET, FILM COATED ORAL
Qty: 0 | Refills: 0 | DISCHARGE

## 2021-08-26 NOTE — H&P PST ADULT - NSICDXPASTMEDICALHX_GEN_ALL_CORE_FT
PAST MEDICAL HISTORY:  WILLIAM (acute kidney injury)     Bladder cancer     CAD (coronary artery disease)     Cerebrovascular accident (CVA) 1994    Chronic kidney disease (CKD)     H/O carotid artery stenosis     Prostate CA

## 2021-08-26 NOTE — H&P PST ADULT - PROBLEM SELECTOR PLAN 1
scheduled for cystoscopy, transurethral resection of bladder tumor with Dr. Carroll on 09/09/2021.  Verbal and written pre-op instructions provided to patient. Patient verbalized understanding and will call surgeons office for revised instructions if surgery is rescheduled.   Pepcid for GI prophylaxis provided.   Patient aware of need for COVID testing prior to  procedure at a [facility]  and advised to coordinate with surgeon to get tested within 72 hours of procedure.

## 2021-08-26 NOTE — H&P PST ADULT - ASSESSMENT
89 yo male with history of CVA in 1994 -right sided weakness and slurred speech, CAD, CABG x2 1989 presents to PST unit with pre-op diagnosis of bladder cancer scheduled for cystoscopy, transurethral resection of bladder tumor with Dr. Carroll.

## 2021-08-26 NOTE — H&P PST ADULT - PROBLEM SELECTOR PLAN 2
Pt. instructed to continue medications as prescribed.  Pending Cardiac eval as per surgeon request-copy requested by PST NP for HTN, CAD & age.

## 2021-08-27 LAB
CULTURE RESULTS: NO GROWTH — SIGNIFICANT CHANGE UP
SPECIMEN SOURCE: SIGNIFICANT CHANGE UP

## 2021-09-06 ENCOUNTER — LABORATORY RESULT (OUTPATIENT)
Age: 86
End: 2021-09-06

## 2021-09-06 ENCOUNTER — APPOINTMENT (OUTPATIENT)
Dept: DISASTER EMERGENCY | Facility: CLINIC | Age: 86
End: 2021-09-06

## 2021-09-06 DIAGNOSIS — Z01.818 ENCOUNTER FOR OTHER PREPROCEDURAL EXAMINATION: ICD-10-CM

## 2021-09-06 PROBLEM — I63.9 CEREBRAL INFARCTION, UNSPECIFIED: Chronic | Status: ACTIVE | Noted: 2019-11-25

## 2021-09-06 PROBLEM — Z86.79 PERSONAL HISTORY OF OTHER DISEASES OF THE CIRCULATORY SYSTEM: Chronic | Status: ACTIVE | Noted: 2021-08-26

## 2021-09-06 PROBLEM — C67.9 MALIGNANT NEOPLASM OF BLADDER, UNSPECIFIED: Chronic | Status: ACTIVE | Noted: 2021-08-26

## 2021-09-08 ENCOUNTER — TRANSCRIPTION ENCOUNTER (OUTPATIENT)
Age: 86
End: 2021-09-08

## 2021-09-09 ENCOUNTER — OUTPATIENT (OUTPATIENT)
Dept: OUTPATIENT SERVICES | Facility: HOSPITAL | Age: 86
LOS: 1 days | Discharge: HOME | End: 2021-09-09
Payer: MEDICARE

## 2021-09-09 ENCOUNTER — RESULT REVIEW (OUTPATIENT)
Age: 86
End: 2021-09-09

## 2021-09-09 ENCOUNTER — APPOINTMENT (OUTPATIENT)
Dept: UROLOGY | Facility: HOSPITAL | Age: 86
End: 2021-09-09

## 2021-09-09 VITALS
WEIGHT: 115.08 LBS | HEIGHT: 65 IN | DIASTOLIC BLOOD PRESSURE: 59 MMHG | TEMPERATURE: 99 F | OXYGEN SATURATION: 100 % | SYSTOLIC BLOOD PRESSURE: 126 MMHG | RESPIRATION RATE: 17 BRPM | HEART RATE: 80 BPM

## 2021-09-09 VITALS
RESPIRATION RATE: 16 BRPM | HEART RATE: 66 BPM | DIASTOLIC BLOOD PRESSURE: 48 MMHG | SYSTOLIC BLOOD PRESSURE: 108 MMHG | OXYGEN SATURATION: 95 %

## 2021-09-09 DIAGNOSIS — Z95.1 PRESENCE OF AORTOCORONARY BYPASS GRAFT: Chronic | ICD-10-CM

## 2021-09-09 DIAGNOSIS — Z98.890 OTHER SPECIFIED POSTPROCEDURAL STATES: Chronic | ICD-10-CM

## 2021-09-09 DIAGNOSIS — C67.9 MALIGNANT NEOPLASM OF BLADDER, UNSPECIFIED: ICD-10-CM

## 2021-09-09 LAB
GAS PNL BLDV: 139 MMOL/L — SIGNIFICANT CHANGE UP (ref 136–145)
HCT VFR BLDA CALC: 33 % — LOW (ref 39–51)
HGB BLD CALC-MCNC: 11.1 G/DL — LOW (ref 13–17)
POTASSIUM BLDV-SCNC: 4 MMOL/L — SIGNIFICANT CHANGE UP (ref 3.5–5.1)

## 2021-09-09 PROCEDURE — 88307 TISSUE EXAM BY PATHOLOGIST: CPT | Mod: 26

## 2021-09-09 PROCEDURE — 52235 CYSTOSCOPY AND TREATMENT: CPT

## 2021-09-09 RX ORDER — LOSARTAN POTASSIUM 100 MG/1
1 TABLET, FILM COATED ORAL
Qty: 0 | Refills: 0 | DISCHARGE

## 2021-09-09 RX ORDER — FERROUS SULFATE 325(65) MG
1 TABLET ORAL
Qty: 0 | Refills: 0 | DISCHARGE

## 2021-09-09 RX ORDER — MULTIVIT-MIN/FERROUS GLUCONATE 9 MG/15 ML
1 LIQUID (ML) ORAL
Qty: 0 | Refills: 0 | DISCHARGE

## 2021-09-09 RX ORDER — FINASTERIDE 5 MG/1
1 TABLET, FILM COATED ORAL
Qty: 0 | Refills: 0 | DISCHARGE

## 2021-09-09 RX ORDER — PREGABALIN 225 MG/1
1 CAPSULE ORAL
Qty: 0 | Refills: 0 | DISCHARGE

## 2021-09-09 RX ORDER — FOLIC ACID 0.8 MG
1 TABLET ORAL
Qty: 0 | Refills: 0 | DISCHARGE

## 2021-09-09 RX ORDER — CHOLECALCIFEROL (VITAMIN D3) 125 MCG
1 CAPSULE ORAL
Qty: 0 | Refills: 0 | DISCHARGE

## 2021-09-09 RX ORDER — ASCORBIC ACID 60 MG
1 TABLET,CHEWABLE ORAL
Qty: 0 | Refills: 0 | DISCHARGE

## 2021-09-09 RX ORDER — TAMSULOSIN HYDROCHLORIDE 0.4 MG/1
1 CAPSULE ORAL
Qty: 0 | Refills: 0 | DISCHARGE

## 2021-09-09 RX ORDER — POTASSIUM CHLORIDE 20 MEQ
1 PACKET (EA) ORAL
Qty: 0 | Refills: 0 | DISCHARGE

## 2021-09-09 RX ORDER — ATORVASTATIN CALCIUM 80 MG/1
1 TABLET, FILM COATED ORAL
Qty: 0 | Refills: 0 | DISCHARGE

## 2021-09-09 RX ORDER — ASPIRIN/CALCIUM CARB/MAGNESIUM 324 MG
1 TABLET ORAL
Qty: 0 | Refills: 0 | DISCHARGE

## 2021-09-09 RX ORDER — FUROSEMIDE 40 MG
1 TABLET ORAL
Qty: 0 | Refills: 0 | DISCHARGE

## 2021-09-09 RX ADMIN — SODIUM CHLORIDE 30 MILLILITER(S): 9 INJECTION, SOLUTION INTRAVENOUS at 09:48

## 2021-09-09 NOTE — BRIEF OPERATIVE NOTE - NSICDXBRIEFPROCEDURE_GEN_ALL_CORE_FT
PROCEDURES:  Cystoscopy, with TURBT, with mitomycin C instillation if indicated 09-Sep-2021 09:28:00  Saima Romero

## 2021-09-09 NOTE — ASU DISCHARGE PLAN (ADULT/PEDIATRIC) - ASU DC SPECIAL INSTRUCTIONSFT
Discharge Instructions: TURP/TURBT    •	Catheter: Some patients are sent home with a beach catheter while others go home urinating on their own. If you still have a catheter, the nurses will review instructions and care before you go home. For men, you will have a prescription for 1% lidocaine jelly to apply to the tip of your penis as needed for catheter related discomfort.  •	General: It is common to have blood in the urine after your procedure. It may be pink or even red; inform your doctor if you have a significant amount of clot in the urine or if you are unable to void at all or if your catheter stops draining. It is not uncommon to have some burning when you urinate, this will gradually improve. With a catheter in place, it is not uncommon to have occasional leakage of urine or blood around the catheter. Please call your urologist if this is excessive and/or the urine is not draining through the catheter into the bag.  •	Bathing: You may shower or bathe. If going home with Beach, shower only until catheter is removed.  •	Diet: You may resume your regular diet and regular medication regimen.  •	Pain: You may take Tylenol (acetaminophen) 650-975mg and/or Motrin (ibuprofen) 400-600mg, available over the counter, for pain every 6 hours as needed. Do not exceed 4000 milligrams of Tylenol (acetaminophen) daily. You may alternate these medications such that you take either one every 3 hours.  •	Antibiotics: You may be given a prescription for an antibiotic, please take this medication as instructed and be sure to complete entire course.  •	Stool softeners: Do not allow yourself to become constipated as straining will cause bleeding. Take stool softeners (ex. Colace) or a laxative (ex. Senekot, ExLax), available over the counter, if needed.  •	Activity: No heavy lifting or strenuous exercise until you are evaluated at your post-operative appointment. Otherwise, you may return to your usual level of activity.  •	Anticoagulation: If you are taking any blood thinning medications, please discuss with your urologist prior to restarting these medications unless otherwise specified.  •	Follow-up: If you did not already schedule your post-operative appointment, please call your urologist to schedule a follow-up appointment.  •	Call your urologist if: You have any bleeding that does not stop, inability to void >8 hours, fever over 100.4 F, chills, persistent nausea/vomiting, or if your pain is not controlled on your discharge pain medications.

## 2021-09-09 NOTE — ASU DISCHARGE PLAN (ADULT/PEDIATRIC) - CARE PROVIDER_API CALL
Dell Carroll  Urology  [address] [place] [address]  Phone: [phone]  Fax: [phone]  Follow Up Time:

## 2021-09-09 NOTE — ASU DISCHARGE PLAN (ADULT/PEDIATRIC) - NURSING INSTRUCTIONS
DO NOT take any Tylenol (Acetaminophen) or narcotics containing Tylenol until after  __2:30____ . You received Tylenol during your operation and it can cause damage to your liver if too much is taken within a 24 hour time period. No heavy lifting or straining. Pt discharged with beach catheter in place. Empty beach bag as needed. Pt maxim PO. Pt offering no c/o pain. Pt to follow up with MD. If any pain not being relieved with medication, beach draining bright red blood or not draining, notify MD. If fever over 101 notify MD.

## 2021-09-10 LAB — SURGICAL PATHOLOGY STUDY: SIGNIFICANT CHANGE UP

## 2021-09-14 ENCOUNTER — APPOINTMENT (OUTPATIENT)
Dept: UROLOGY | Facility: CLINIC | Age: 86
End: 2021-09-14
Payer: MEDICARE

## 2021-09-14 VITALS
WEIGHT: 115 LBS | HEART RATE: 60 BPM | BODY MASS INDEX: 17.03 KG/M2 | TEMPERATURE: 98 F | DIASTOLIC BLOOD PRESSURE: 58 MMHG | HEIGHT: 69 IN | SYSTOLIC BLOOD PRESSURE: 149 MMHG

## 2021-09-14 DIAGNOSIS — C67.9 MALIGNANT NEOPLASM OF BLADDER, UNSPECIFIED: ICD-10-CM

## 2021-09-14 PROCEDURE — 99213 OFFICE O/P EST LOW 20 MIN: CPT

## 2021-09-14 NOTE — PHYSICAL EXAM
[General Appearance - Well Developed] : well developed [General Appearance - Well Nourished] : well nourished [Normal Appearance] : normal appearance [Well Groomed] : well groomed [General Appearance - In No Acute Distress] : no acute distress [Abdomen Soft] : soft [Abdomen Tenderness] : non-tender [Costovertebral Angle Tenderness] : no ~M costovertebral angle tenderness [Urethral Meatus] : meatus normal [Urinary Bladder Findings] : the bladder was normal on palpation [Scrotum] : the scrotum was normal [FreeTextEntry1] : beach in place draining clear yellow urine [Edema] : no peripheral edema [] : no respiratory distress [Exaggerated Use Of Accessory Muscles For Inspiration] : no accessory muscle use [Respiration, Rhythm And Depth] : normal respiratory rhythm and effort [Oriented To Time, Place, And Person] : oriented to person, place, and time [Affect] : the affect was normal [Mood] : the mood was normal [Not Anxious] : not anxious [Normal Station and Gait] : the gait and station were normal for the patient's age [No Focal Deficits] : no focal deficits

## 2021-09-14 NOTE — HISTORY OF PRESENT ILLNESS
[FreeTextEntry1] : Pt comes in s/p TURBT by Dr. Carroll. As per pt and family he has been having hematuria in his beach catheter that he noticed yesterday. Today the beach catheter is draining clear yellow urine.

## 2021-09-16 ENCOUNTER — NON-APPOINTMENT (OUTPATIENT)
Age: 86
End: 2021-09-16

## 2021-09-21 ENCOUNTER — APPOINTMENT (OUTPATIENT)
Dept: UROLOGY | Facility: CLINIC | Age: 86
End: 2021-09-21

## 2021-09-22 ENCOUNTER — APPOINTMENT (OUTPATIENT)
Dept: UROLOGY | Facility: CLINIC | Age: 86
End: 2021-09-22
Payer: MEDICARE

## 2021-09-22 ENCOUNTER — APPOINTMENT (OUTPATIENT)
Dept: UROLOGY | Facility: CLINIC | Age: 86
End: 2021-09-22

## 2021-09-22 VITALS
DIASTOLIC BLOOD PRESSURE: 54 MMHG | TEMPERATURE: 97.5 F | HEART RATE: 84 BPM | BODY MASS INDEX: 17.03 KG/M2 | HEIGHT: 69 IN | SYSTOLIC BLOOD PRESSURE: 146 MMHG | WEIGHT: 115 LBS

## 2021-09-22 PROCEDURE — 51700 IRRIGATION OF BLADDER: CPT

## 2021-09-22 PROCEDURE — A4218: CPT | Mod: NC

## 2021-09-27 RX ORDER — BACILLUS CALMETTE-GUERIN 50 MG/50ML
50 POWDER, FOR SUSPENSION INTRAVESICAL
Qty: 1 | Refills: 5 | Status: ACTIVE | COMMUNITY
Start: 2021-09-27

## 2021-09-29 ENCOUNTER — APPOINTMENT (OUTPATIENT)
Dept: UROLOGY | Facility: CLINIC | Age: 86
End: 2021-09-29
Payer: MEDICARE

## 2021-09-29 VITALS
BODY MASS INDEX: 17.03 KG/M2 | DIASTOLIC BLOOD PRESSURE: 72 MMHG | TEMPERATURE: 97.3 F | HEART RATE: 84 BPM | SYSTOLIC BLOOD PRESSURE: 169 MMHG | HEIGHT: 69 IN | WEIGHT: 115 LBS

## 2021-09-29 LAB
BILIRUB UR QL STRIP: NEGATIVE
CLARITY UR: CLEAR
COLLECTION METHOD: NORMAL
GLUCOSE UR-MCNC: NEGATIVE
HCG UR QL: 0.2 EU/DL
HGB UR QL STRIP.AUTO: NORMAL
KETONES UR-MCNC: NEGATIVE
LEUKOCYTE ESTERASE UR QL STRIP: NORMAL
NITRITE UR QL STRIP: NEGATIVE
PH UR STRIP: 5.5
PROT UR STRIP-MCNC: 30
SP GR UR STRIP: 1.02

## 2021-09-29 PROCEDURE — 99213 OFFICE O/P EST LOW 20 MIN: CPT

## 2021-09-29 PROCEDURE — 51798 US URINE CAPACITY MEASURE: CPT

## 2021-09-29 NOTE — PHYSICAL EXAM
[General Appearance - Well Developed] : well developed [Normal Appearance] : normal appearance [Well Groomed] : well groomed [General Appearance - In No Acute Distress] : no acute distress [Abdomen Soft] : soft [Abdomen Tenderness] : non-tender [Costovertebral Angle Tenderness] : no ~M costovertebral angle tenderness [Urethral Meatus] : meatus normal [FreeTextEntry1] : PVR 51cc [Edema] : no peripheral edema [] : no respiratory distress [Respiration, Rhythm And Depth] : normal respiratory rhythm and effort [Exaggerated Use Of Accessory Muscles For Inspiration] : no accessory muscle use [Oriented To Time, Place, And Person] : oriented to person, place, and time [Affect] : the affect was normal [Mood] : the mood was normal [Not Anxious] : not anxious [Normal Station and Gait] : the gait and station were normal for the patient's age [No Focal Deficits] : no focal deficits

## 2021-09-29 NOTE — HISTORY OF PRESENT ILLNESS
[FreeTextEntry1] : 89yo M hx of prostate CA s/p radiation in past, recent findings of Bladder tumor s/p TURBT 9/9/21 comes in today 1 weeks after passing TOV. \par Family complains of urinary leakage. Wife states leakage started after his recent TURBT but noticed its slowly getting slightly better. Denies dysuria, hematuria, fever, chills.\par \par PVR 51cc.

## 2021-09-29 NOTE — ASSESSMENT
[FreeTextEntry1] : Bladder CA: \par pt will return in 3 weeks f/u with Dr. Roberts for weekly BCG instillation x 6 weeks\par \par Urinary retention: \par low PVR. \par monitor

## 2021-09-29 NOTE — REVIEW OF SYSTEMS
[see HPI] : see HPI [Incontinence] : incontinence [Arthralgias] : arthralgias [Limb Weakness] : limb weakness [Difficulty Walking] : difficulty walking [Negative] : Heme/Lymph

## 2021-10-20 ENCOUNTER — APPOINTMENT (OUTPATIENT)
Dept: UROLOGY | Facility: CLINIC | Age: 86
End: 2021-10-20
Payer: MEDICARE

## 2021-10-20 ENCOUNTER — RESULT CHARGE (OUTPATIENT)
Age: 86
End: 2021-10-20

## 2021-10-20 VITALS
BODY MASS INDEX: 17.03 KG/M2 | HEIGHT: 69 IN | TEMPERATURE: 97.8 F | SYSTOLIC BLOOD PRESSURE: 182 MMHG | HEART RATE: 92 BPM | DIASTOLIC BLOOD PRESSURE: 79 MMHG | WEIGHT: 115 LBS

## 2021-10-20 LAB
BILIRUB UR QL STRIP: NORMAL
CLARITY UR: NORMAL
COLLECTION METHOD: NORMAL
GLUCOSE UR-MCNC: NEGATIVE
HCG UR QL: 1 EU/DL
HGB UR QL STRIP.AUTO: NORMAL
KETONES UR-MCNC: NORMAL
LEUKOCYTE ESTERASE UR QL STRIP: NORMAL
NITRITE UR QL STRIP: NEGATIVE
PH UR STRIP: 5.5
PROT UR STRIP-MCNC: 100
SP GR UR STRIP: 1.01

## 2021-10-20 PROCEDURE — 99213 OFFICE O/P EST LOW 20 MIN: CPT

## 2021-10-21 LAB
APPEARANCE: ABNORMAL
BACTERIA: NEGATIVE
BILIRUBIN URINE: NEGATIVE
BLOOD URINE: ABNORMAL
COLOR: ABNORMAL
GLUCOSE QUALITATIVE U: NEGATIVE
HYALINE CASTS: 14 /LPF
KETONES URINE: ABNORMAL
LEUKOCYTE ESTERASE URINE: ABNORMAL
MICROSCOPIC-UA: NORMAL
NITRITE URINE: NEGATIVE
PH URINE: 6
PROTEIN URINE: ABNORMAL
RED BLOOD CELLS URINE: >720 /HPF
SPECIFIC GRAVITY URINE: 1.01
SQUAMOUS EPITHELIAL CELLS: 0 /HPF
UROBILINOGEN URINE: NORMAL
WHITE BLOOD CELLS URINE: 106 /HPF

## 2021-10-22 ENCOUNTER — OUTPATIENT (OUTPATIENT)
Dept: OUTPATIENT SERVICES | Facility: HOSPITAL | Age: 86
LOS: 1 days | End: 2021-10-22
Payer: MEDICARE

## 2021-10-22 ENCOUNTER — APPOINTMENT (OUTPATIENT)
Dept: UROLOGY | Facility: CLINIC | Age: 86
End: 2021-10-22
Payer: MEDICARE

## 2021-10-22 VITALS
OXYGEN SATURATION: 98 % | DIASTOLIC BLOOD PRESSURE: 86 MMHG | SYSTOLIC BLOOD PRESSURE: 170 MMHG | TEMPERATURE: 98 F | RESPIRATION RATE: 17 BRPM | HEIGHT: 69 IN | WEIGHT: 115 LBS | HEART RATE: 90 BPM | BODY MASS INDEX: 17.03 KG/M2

## 2021-10-22 DIAGNOSIS — Z98.890 OTHER SPECIFIED POSTPROCEDURAL STATES: Chronic | ICD-10-CM

## 2021-10-22 DIAGNOSIS — Z95.1 PRESENCE OF AORTOCORONARY BYPASS GRAFT: Chronic | ICD-10-CM

## 2021-10-22 PROCEDURE — 52224 CYSTOSCOPY AND TREATMENT: CPT

## 2021-10-25 LAB — BACTERIA UR CULT: NORMAL

## 2021-10-26 LAB — BACTERIA UR CULT: ABNORMAL

## 2021-10-28 ENCOUNTER — APPOINTMENT (OUTPATIENT)
Dept: UROLOGY | Facility: CLINIC | Age: 86
End: 2021-10-28

## 2021-11-04 ENCOUNTER — APPOINTMENT (OUTPATIENT)
Dept: UROLOGY | Facility: CLINIC | Age: 86
End: 2021-11-04

## 2021-11-11 ENCOUNTER — APPOINTMENT (OUTPATIENT)
Dept: UROLOGY | Facility: CLINIC | Age: 86
End: 2021-11-11
Payer: MEDICARE

## 2021-11-11 VITALS
WEIGHT: 115 LBS | DIASTOLIC BLOOD PRESSURE: 71 MMHG | TEMPERATURE: 97.4 F | SYSTOLIC BLOOD PRESSURE: 145 MMHG | BODY MASS INDEX: 17.03 KG/M2 | HEIGHT: 69 IN | HEART RATE: 93 BPM

## 2021-11-11 LAB
BILIRUB UR QL STRIP: NEGATIVE
CLARITY UR: CLEAR
COLLECTION METHOD: NORMAL
GLUCOSE UR-MCNC: NEGATIVE
HCG UR QL: 0.2 EU/DL
HGB UR QL STRIP.AUTO: NORMAL
KETONES UR-MCNC: NEGATIVE
LEUKOCYTE ESTERASE UR QL STRIP: NORMAL
NITRITE UR QL STRIP: NEGATIVE
PH UR STRIP: 5.5
PROT UR STRIP-MCNC: NORMAL
SP GR UR STRIP: 1.02

## 2021-11-11 PROCEDURE — 51720 TREATMENT OF BLADDER LESION: CPT

## 2021-11-11 PROCEDURE — 81003 URINALYSIS AUTO W/O SCOPE: CPT | Mod: QW

## 2021-11-11 RX ORDER — BACILLUS CALMETTE-GUERIN 50 MG/50ML
50 POWDER, FOR SUSPENSION INTRAVESICAL
Qty: 0 | Refills: 0 | Status: COMPLETED | OUTPATIENT
Start: 2021-11-11

## 2021-11-11 RX ORDER — BACILLUS CALMETTE-GUERIN 50 MG/50ML
50 POWDER, FOR SUSPENSION INTRAVESICAL
Qty: 1 | Refills: 2 | Status: ACTIVE | COMMUNITY
Start: 2021-11-11

## 2021-11-11 RX ADMIN — Medication 0 MG: at 00:00

## 2021-11-18 ENCOUNTER — APPOINTMENT (OUTPATIENT)
Dept: UROLOGY | Facility: CLINIC | Age: 86
End: 2021-11-18
Payer: MEDICARE

## 2021-11-18 VITALS
DIASTOLIC BLOOD PRESSURE: 67 MMHG | BODY MASS INDEX: 17.03 KG/M2 | TEMPERATURE: 97.3 F | WEIGHT: 115 LBS | HEIGHT: 69 IN | SYSTOLIC BLOOD PRESSURE: 179 MMHG | HEART RATE: 73 BPM

## 2021-11-18 LAB
BILIRUB UR QL STRIP: NEGATIVE
CLARITY UR: CLEAR
COLLECTION METHOD: NORMAL
GLUCOSE UR-MCNC: NEGATIVE
HCG UR QL: 0.2 EU/DL
HGB UR QL STRIP.AUTO: NORMAL
KETONES UR-MCNC: NEGATIVE
LEUKOCYTE ESTERASE UR QL STRIP: NORMAL
NITRITE UR QL STRIP: NEGATIVE
PH UR STRIP: 5
PROT UR STRIP-MCNC: NEGATIVE
SP GR UR STRIP: 1.01

## 2021-11-18 PROCEDURE — 81003 URINALYSIS AUTO W/O SCOPE: CPT | Mod: QW

## 2021-11-18 PROCEDURE — 51720 TREATMENT OF BLADDER LESION: CPT

## 2021-11-18 RX ORDER — BACILLUS CALMETTE-GUERIN 50 MG/50ML
50 POWDER, FOR SUSPENSION INTRAVESICAL
Qty: 0 | Refills: 0 | Status: COMPLETED | OUTPATIENT
Start: 2021-11-18

## 2021-11-18 RX ORDER — LEVOFLOXACIN 500 MG/1
500 TABLET, FILM COATED ORAL DAILY
Qty: 3 | Refills: 0 | Status: DISCONTINUED | COMMUNITY
Start: 2021-10-26 | End: 2021-11-18

## 2021-11-18 RX ORDER — BACILLUS CALMETTE-GUERIN 50 MG/50ML
50 POWDER, FOR SUSPENSION INTRAVESICAL
Qty: 1 | Refills: 0 | Status: ACTIVE | COMMUNITY
Start: 2021-11-18 | End: 1900-01-01

## 2021-11-18 RX ADMIN — Medication 0 MG: at 00:00

## 2021-11-23 ENCOUNTER — APPOINTMENT (OUTPATIENT)
Dept: UROLOGY | Facility: CLINIC | Age: 86
End: 2021-11-23

## 2021-11-24 ENCOUNTER — APPOINTMENT (OUTPATIENT)
Dept: UROLOGY | Facility: CLINIC | Age: 86
End: 2021-11-24
Payer: MEDICARE

## 2021-11-24 VITALS
HEIGHT: 69 IN | SYSTOLIC BLOOD PRESSURE: 127 MMHG | BODY MASS INDEX: 17.03 KG/M2 | DIASTOLIC BLOOD PRESSURE: 56 MMHG | HEART RATE: 66 BPM | TEMPERATURE: 96.5 F | WEIGHT: 115 LBS

## 2021-11-24 LAB
BILIRUB UR QL STRIP: NEGATIVE
CLARITY UR: NORMAL
COLLECTION METHOD: NORMAL
GLUCOSE UR-MCNC: NEGATIVE
HCG UR QL: 0.2 EU/DL
HGB UR QL STRIP.AUTO: NORMAL
KETONES UR-MCNC: NEGATIVE
LEUKOCYTE ESTERASE UR QL STRIP: NORMAL
NITRITE UR QL STRIP: NEGATIVE
PH UR STRIP: 6
PROT UR STRIP-MCNC: 100
SP GR UR STRIP: 1.01

## 2021-11-24 PROCEDURE — 81003 URINALYSIS AUTO W/O SCOPE: CPT | Mod: QW

## 2021-11-24 PROCEDURE — 99212 OFFICE O/P EST SF 10 MIN: CPT

## 2021-11-27 LAB
APPEARANCE: ABNORMAL
BACTERIA UR CULT: NORMAL
BACTERIA: NEGATIVE
BILIRUBIN URINE: NEGATIVE
BLOOD URINE: ABNORMAL
COLOR: ABNORMAL
GLUCOSE QUALITATIVE U: NEGATIVE
HYALINE CASTS: 1 /LPF
KETONES URINE: NEGATIVE
LEUKOCYTE ESTERASE URINE: ABNORMAL
MICROSCOPIC-UA: NORMAL
NITRITE URINE: NEGATIVE
PH URINE: 6
PROTEIN URINE: ABNORMAL
RED BLOOD CELLS URINE: >720 /HPF
SPECIFIC GRAVITY URINE: 1.02
SQUAMOUS EPITHELIAL CELLS: 4 /HPF
UROBILINOGEN URINE: NORMAL
WHITE BLOOD CELLS URINE: 18 /HPF

## 2021-11-30 DIAGNOSIS — C67.9 MALIGNANT NEOPLASM OF BLADDER, UNSPECIFIED: ICD-10-CM

## 2021-11-30 DIAGNOSIS — R31.0 GROSS HEMATURIA: ICD-10-CM

## 2021-12-01 ENCOUNTER — APPOINTMENT (OUTPATIENT)
Dept: UROLOGY | Facility: CLINIC | Age: 86
End: 2021-12-01
Payer: MEDICARE

## 2021-12-01 VITALS
HEART RATE: 92 BPM | WEIGHT: 115 LBS | HEIGHT: 69 IN | SYSTOLIC BLOOD PRESSURE: 163 MMHG | BODY MASS INDEX: 17.03 KG/M2 | DIASTOLIC BLOOD PRESSURE: 67 MMHG | TEMPERATURE: 98 F

## 2021-12-01 LAB
BILIRUB UR QL STRIP: NEGATIVE
CLARITY UR: CLEAR
GLUCOSE UR-MCNC: NEGATIVE
HCG UR QL: 0.2 EU/DL
HGB UR QL STRIP.AUTO: NORMAL
KETONES UR-MCNC: NEGATIVE
LEUKOCYTE ESTERASE UR QL STRIP: NORMAL
NITRITE UR QL STRIP: NEGATIVE
PH UR STRIP: 5.5
PROT UR STRIP-MCNC: NEGATIVE
SP GR UR STRIP: 1.01

## 2021-12-01 PROCEDURE — 81003 URINALYSIS AUTO W/O SCOPE: CPT | Mod: QW

## 2021-12-01 PROCEDURE — 51720 TREATMENT OF BLADDER LESION: CPT

## 2021-12-01 RX ORDER — BACILLUS CALMETTE-GUERIN 50 MG/50ML
50 POWDER, FOR SUSPENSION INTRAVESICAL
Qty: 0 | Refills: 0 | Status: COMPLETED | OUTPATIENT
Start: 2021-12-01

## 2021-12-01 RX ORDER — BACILLUS CALMETTE-GUERIN 50 MG/50ML
50 POWDER, FOR SUSPENSION INTRAVESICAL
Qty: 1 | Refills: 0 | Status: ACTIVE | COMMUNITY
Start: 2021-12-01

## 2021-12-01 RX ADMIN — Medication 0 MG: at 00:00

## 2021-12-07 ENCOUNTER — APPOINTMENT (OUTPATIENT)
Dept: UROLOGY | Facility: CLINIC | Age: 86
End: 2021-12-07
Payer: MEDICARE

## 2021-12-07 VITALS
TEMPERATURE: 97.6 F | SYSTOLIC BLOOD PRESSURE: 180 MMHG | HEIGHT: 69 IN | HEART RATE: 77 BPM | WEIGHT: 115 LBS | BODY MASS INDEX: 17.03 KG/M2 | DIASTOLIC BLOOD PRESSURE: 67 MMHG

## 2021-12-07 LAB
BILIRUB UR QL STRIP: NEGATIVE
CLARITY UR: NORMAL
COLLECTION METHOD: NORMAL
GLUCOSE UR-MCNC: NEGATIVE
HCG UR QL: 0.2 EU/DL
HGB UR QL STRIP.AUTO: NORMAL
KETONES UR-MCNC: NEGATIVE
LEUKOCYTE ESTERASE UR QL STRIP: NEGATIVE
NITRITE UR QL STRIP: NEGATIVE
PH UR STRIP: 5
PROT UR STRIP-MCNC: 100
SP GR UR STRIP: 1.01

## 2021-12-07 PROCEDURE — 99213 OFFICE O/P EST LOW 20 MIN: CPT

## 2021-12-07 PROCEDURE — 81003 URINALYSIS AUTO W/O SCOPE: CPT | Mod: QW

## 2021-12-07 NOTE — HISTORY OF PRESENT ILLNESS
[FreeTextEntry1] : Pt comes in for BCG. Urine noted to have gross hematuria. Pt denies any other symptoms.

## 2021-12-09 LAB
APPEARANCE: ABNORMAL
BACTERIA UR CULT: NORMAL
BACTERIA: NEGATIVE
BILIRUBIN URINE: ABNORMAL
BLOOD URINE: ABNORMAL
COLOR: ABNORMAL
GLUCOSE QUALITATIVE U: NEGATIVE
HYALINE CASTS: 3 /LPF
KETONES URINE: NEGATIVE
LEUKOCYTE ESTERASE URINE: NEGATIVE
MICROSCOPIC-UA: NORMAL
NITRITE URINE: NEGATIVE
PH URINE: 6
PROTEIN URINE: ABNORMAL
RED BLOOD CELLS URINE: >720 /HPF
SPECIFIC GRAVITY URINE: 1.02
SQUAMOUS EPITHELIAL CELLS: 3 /HPF
UROBILINOGEN URINE: NORMAL
WHITE BLOOD CELLS URINE: 27 /HPF

## 2021-12-14 ENCOUNTER — APPOINTMENT (OUTPATIENT)
Dept: UROLOGY | Facility: CLINIC | Age: 86
End: 2021-12-14
Payer: MEDICARE

## 2021-12-14 VITALS
TEMPERATURE: 96.8 F | HEART RATE: 73 BPM | HEIGHT: 69 IN | WEIGHT: 110 LBS | OXYGEN SATURATION: 96 % | DIASTOLIC BLOOD PRESSURE: 71 MMHG | BODY MASS INDEX: 16.29 KG/M2 | SYSTOLIC BLOOD PRESSURE: 161 MMHG

## 2021-12-14 LAB
BILIRUB UR QL STRIP: NEGATIVE
CLARITY UR: CLEAR
COLLECTION METHOD: NORMAL
GLUCOSE UR-MCNC: NEGATIVE
HCG UR QL: 0.2 EU/DL
HGB UR QL STRIP.AUTO: NORMAL
KETONES UR-MCNC: NEGATIVE
LEUKOCYTE ESTERASE UR QL STRIP: NEGATIVE
NITRITE UR QL STRIP: NEGATIVE
PH UR STRIP: 5.5
PROT UR STRIP-MCNC: NEGATIVE
SP GR UR STRIP: 1.01

## 2021-12-14 PROCEDURE — 81002 URINALYSIS NONAUTO W/O SCOPE: CPT

## 2021-12-14 PROCEDURE — 51720 TREATMENT OF BLADDER LESION: CPT

## 2021-12-14 RX ORDER — BACILLUS CALMETTE-GUERIN 50 MG/50ML
50 POWDER, FOR SUSPENSION INTRAVESICAL
Qty: 1 | Refills: 0 | Status: ACTIVE | COMMUNITY
Start: 2021-12-14

## 2021-12-14 RX ORDER — BACILLUS CALMETTE-GUERIN 50 MG/50ML
50 POWDER, FOR SUSPENSION INTRAVESICAL
Qty: 0 | Refills: 0 | Status: COMPLETED | OUTPATIENT
Start: 2021-12-14

## 2021-12-14 RX ADMIN — Medication 0 MG: at 00:00

## 2021-12-21 ENCOUNTER — APPOINTMENT (OUTPATIENT)
Dept: UROLOGY | Facility: CLINIC | Age: 86
End: 2021-12-21
Payer: MEDICARE

## 2021-12-21 VITALS
TEMPERATURE: 97.9 F | DIASTOLIC BLOOD PRESSURE: 74 MMHG | HEART RATE: 72 BPM | WEIGHT: 110 LBS | BODY MASS INDEX: 16.29 KG/M2 | SYSTOLIC BLOOD PRESSURE: 179 MMHG | HEIGHT: 69 IN

## 2021-12-21 PROCEDURE — 51720 TREATMENT OF BLADDER LESION: CPT

## 2021-12-21 PROCEDURE — 81003 URINALYSIS AUTO W/O SCOPE: CPT | Mod: QW

## 2021-12-21 RX ORDER — BACILLUS CALMETTE-GUERIN 50 MG/50ML
50 POWDER, FOR SUSPENSION INTRAVESICAL
Qty: 0 | Refills: 0 | Status: COMPLETED | OUTPATIENT
Start: 2021-12-21

## 2021-12-21 RX ADMIN — Medication 0 MG: at 00:00

## 2021-12-22 LAB
BILIRUB UR QL STRIP: NEGATIVE
CLARITY UR: NORMAL
COLLECTION METHOD: NORMAL
GLUCOSE UR-MCNC: NEGATIVE
HCG UR QL: 0.2 EU/DL
HGB UR QL STRIP.AUTO: NORMAL
KETONES UR-MCNC: NEGATIVE
LEUKOCYTE ESTERASE UR QL STRIP: NORMAL
NITRITE UR QL STRIP: NEGATIVE
PH UR STRIP: 5
PROT UR STRIP-MCNC: 30
SP GR UR STRIP: 1.01

## 2021-12-22 RX ORDER — BACILLUS CALMETTE-GUERIN 50 MG/50ML
50 POWDER, FOR SUSPENSION INTRAVESICAL
Qty: 1 | Refills: 0 | Status: ACTIVE | COMMUNITY
Start: 2021-12-21

## 2021-12-28 ENCOUNTER — APPOINTMENT (OUTPATIENT)
Dept: UROLOGY | Facility: CLINIC | Age: 86
End: 2021-12-28
Payer: MEDICARE

## 2021-12-28 VITALS
WEIGHT: 110 LBS | TEMPERATURE: 97.7 F | BODY MASS INDEX: 16.29 KG/M2 | HEIGHT: 69 IN | DIASTOLIC BLOOD PRESSURE: 66 MMHG | SYSTOLIC BLOOD PRESSURE: 162 MMHG | HEART RATE: 83 BPM

## 2021-12-28 LAB
BILIRUB UR QL STRIP: NEGATIVE
CLARITY UR: CLEAR
COLLECTION METHOD: NORMAL
GLUCOSE UR-MCNC: NEGATIVE
HCG UR QL: 0.2 EU/DL
HGB UR QL STRIP.AUTO: NORMAL
KETONES UR-MCNC: NEGATIVE
LEUKOCYTE ESTERASE UR QL STRIP: NORMAL
NITRITE UR QL STRIP: NEGATIVE
PH UR STRIP: 5.5
PROT UR STRIP-MCNC: NEGATIVE
SP GR UR STRIP: 1.03

## 2021-12-28 PROCEDURE — 81003 URINALYSIS AUTO W/O SCOPE: CPT | Mod: QW

## 2021-12-28 PROCEDURE — 51720 TREATMENT OF BLADDER LESION: CPT

## 2021-12-28 RX ORDER — BACILLUS CALMETTE-GUERIN 50 MG/50ML
50 POWDER, FOR SUSPENSION INTRAVESICAL
Qty: 0 | Refills: 0 | Status: COMPLETED | OUTPATIENT
Start: 2021-12-28

## 2021-12-28 RX ORDER — BACILLUS CALMETTE-GUERIN 50 MG/50ML
50 POWDER, FOR SUSPENSION INTRAVESICAL
Qty: 1 | Refills: 0 | Status: ACTIVE | COMMUNITY
Start: 2021-12-28

## 2021-12-28 RX ADMIN — Medication 0 MG: at 00:00

## 2022-01-01 NOTE — ASU PATIENT PROFILE, ADULT - CAREGIVER PHONE NUMBER
[phone] 1. I was told the name of the doctor(s) who took care of my child while in the hospital.    2. I have been told about any important findings on my child's plan of care.    3. The doctor clearly explained my child's diagnosis and other possible diagnoses that were considered.    4. My child's doctor explained all the tests that were done and their results (if available). I understand that some of the test results may not be ready before we go home and I was told how I can get these results. I understand that a summary of my child's hospitalization and important test results will be shared with my child's outpatient doctor.    5. My child's doctor talked to me about what I need to do when we go home.    6. I understand what signs and symptoms to watch for. I understand what symptoms I would need to call my doctor for and/or return to the hospital.    7. I have the phone number to call the hospital for results and/or questions after I leave the hospital.

## 2022-02-04 ENCOUNTER — APPOINTMENT (OUTPATIENT)
Dept: UROLOGY | Facility: CLINIC | Age: 87
End: 2022-02-04
Payer: MEDICARE

## 2022-02-04 ENCOUNTER — APPOINTMENT (OUTPATIENT)
Dept: UROLOGY | Facility: CLINIC | Age: 87
End: 2022-02-04

## 2022-02-04 ENCOUNTER — OUTPATIENT (OUTPATIENT)
Dept: OUTPATIENT SERVICES | Facility: HOSPITAL | Age: 87
LOS: 1 days | End: 2022-02-04
Payer: MEDICARE

## 2022-02-04 VITALS
DIASTOLIC BLOOD PRESSURE: 78 MMHG | RESPIRATION RATE: 16 BRPM | TEMPERATURE: 97.5 F | HEART RATE: 74 BPM | SYSTOLIC BLOOD PRESSURE: 142 MMHG

## 2022-02-04 DIAGNOSIS — C67.9 MALIGNANT NEOPLASM OF BLADDER, UNSPECIFIED: ICD-10-CM

## 2022-02-04 DIAGNOSIS — R31.0 GROSS HEMATURIA: ICD-10-CM

## 2022-02-04 DIAGNOSIS — R35.0 FREQUENCY OF MICTURITION: ICD-10-CM

## 2022-02-04 DIAGNOSIS — Z98.890 OTHER SPECIFIED POSTPROCEDURAL STATES: Chronic | ICD-10-CM

## 2022-02-04 DIAGNOSIS — Z95.1 PRESENCE OF AORTOCORONARY BYPASS GRAFT: Chronic | ICD-10-CM

## 2022-02-04 PROCEDURE — 52000 CYSTOURETHROSCOPY: CPT

## 2022-02-04 PROCEDURE — 88112 CYTOPATH CELL ENHANCE TECH: CPT | Mod: 26

## 2022-02-09 ENCOUNTER — NON-APPOINTMENT (OUTPATIENT)
Age: 87
End: 2022-02-09

## 2022-02-09 LAB — BACTERIA UR CULT: ABNORMAL

## 2022-02-09 RX ORDER — SULFAMETHOXAZOLE AND TRIMETHOPRIM 800; 160 MG/1; MG/1
800-160 TABLET ORAL
Qty: 14 | Refills: 0 | Status: ACTIVE | COMMUNITY
Start: 2022-02-09 | End: 1900-01-01

## 2022-02-11 LAB — URINE CYTOLOGY: NORMAL

## 2022-03-08 ENCOUNTER — APPOINTMENT (OUTPATIENT)
Dept: UROLOGY | Facility: CLINIC | Age: 87
End: 2022-03-08

## 2022-04-21 NOTE — PROGRESS NOTE ADULT - SUBJECTIVE AND OBJECTIVE BOX
Discharge instructions and follow up discussed with pt. Pt verbalized understanding and denied further questions. LDA removed. Pt discharged with all belongings.         Pj Franklin RN  04/21/22 6689 Subjective    No overnight events.  On Alum CBI    Objective    Vital signs  T(F): , Max: 98.8 (12-09-19 @ 21:51)  HR: 79 (12-10-19 @ 06:00)  BP: 138/50 (12-10-19 @ 06:00)  SpO2: 99% (12-10-19 @ 06:00)  Wt(kg): --    Output     12-09 @ 07:01  -  12-10 @ 07:00  --------------------------------------------------------  IN: 60 mL / OUT: 1250 mL / NET: -1190 mL    ALUM CBI light brown on clear background      Gen awake alert NAD  Abd soft   foely in place, CBI Alum in progress light brown on clear background    Labs      12-10 @ 06:31    WBC 5.98  / Hct 27.5  / SCr 1.14     12-09 @ 06:02    WBC 5.91  / Hct 28.0  / SCr 1.15         Imaging:

## 2022-05-31 NOTE — PROGRESS NOTE ADULT - PROBLEM SELECTOR PLAN 1
CHIEF COMPLAINT:   URI symptoms-- wheezing    HPI:    Autumn B Josh is a 26 year old female, assigned to me, who comes in today complaining of wheezing symptoms that started about 2 weeks ago.      She states that she was in her usual state of health until a few weeks ago when she started noticing that she was feeling short of breath at home.  She did not feel this way when she was at work or outside of the home.  When she was inside, she had a dry cough and sometimes felt a little short of breath.  In the evening, especially, she felt a little wheezy.  When she laid down at night, she could actually hear a high-pitched wheeze when she was breathing.  She did feel like she had trouble breathing, but it did bother her that she was wheezing when she was breathing.  In the middle the night, she would occasionally wake up feeling short of breath and the wheezing seems worse.  She borrowed her son's albuterol inhaler and gave herself a dose.  When she did this, her breathing felt much better.  This lasted for several hours before getting worse again.  In the morning, things cleared up.    Her past medical history is negative for any history of asthma herself.  She does have some seasonal allergies and takes antihistamines with usually good relief.  Her son's father, who lives with them, smokes but he smokes outside the house.  She does note that the smoke on his clothing does bother her and causes her to cough when she smells it.  They do not have any pets and she does not recall anything else new around the house that could be triggering an allergic reaction.  They been spending time outside, biking and playing in the yard, but other than that nothing else seems to be new in their environment.    She has a history of recurring sinus infections.  She states that she gets sinus infection 3-4 times a year.  They are usually easily managed with course of antibiotics, decongestants, and or nasal sprays.  Typically they  occur in the colder months.  With regard to her sinuses now, she does feel some pressure, but not as bad as a typical sinus infection.  There is some clear nasal discharge, which is different than the greenish thick discharge she usually gets.    She otherwise felt well, and denies any fever, chills, cough, sore throat, shortness of breath or wheezing, chest pain or palpitations, GI symptoms, loss of taste or smell, rash or joint aches.      COVID questions:     Abdominal pain:  No  Headache:  No  Fever:  No  Nausea or vomiting:  No  Diarrhea:  No  Dizziness:  No  Weakness:  No  Rhinorrhea:  Yes, clear  Cough:  No  SOB:  Yes, mild  Myalgias:  No  Skin rash:  No  Loss of taste and/or smell sense:  No    Known COVID exposures:  No  Anyone you know tested positive for COVID:  No  Active or recent tobacco use?  No    Treatment thus far has consisted of albuterol, Vicks Sinex.  Ill contacts:  No known exposure  Records indicate that she has not received this season's annual influenza vaccine, and is not up-to-date with respect to her COVID vaccinations.    REVIEW OF SYSTEMS:    Comprehensive review of systems was reviewed and discussed with the patient, and was otherwise negative, except as noted in the history of present illness, above.    PAST MEDICAL, SURGICAL, MEDICATION, ALLERGY, & SOCIAL HISTORIES:    I have reviewed the past medical history, family history, social history, medications and allergies listed in the medical record as obtained by my nursing staff and support staff and agree with their documentation.    Past Medical History:   Diagnosis Date   • Oral herpes    • Seasonal allergies    • Urinary tract infection      Past Surgical History:   Procedure Laterality Date   • Laparoscopic cholecystectomy  04/13/2021    mukund Razo   • Tonsillectomy and adenoidectomy     • [place] tooth extraction       Current Outpatient Medications   Medication Sig Dispense Refill   • DISPENSE OTC inhaler     •  norethindrone-ethinyl estradiol-FE (Aurovela FE 1/20) 1-20 MG-MCG per tablet Take 1 tablet by mouth daily. 84 tablet 0   • meclizine (ANTIVERT) 25 MG tablet Take 1 tablet by mouth 3 times daily as needed for Dizziness. 30 tablet 0   • escitalopram (LEXAPRO) 5 MG tablet Take 5 mg by mouth daily.     • cetirizine (ZyrTEC) 10 MG tablet Take 10 mg by mouth daily.     • Cyanocobalamin (B-12 PO) Take 1 tablet by mouth daily.      • ascorbic acid (Vitamin C) 250 MG tablet Take 250 mg by mouth daily.     • amoxicillin-clavulanate (AUGMENTIN) 875-125 MG per tablet Take 1 tablet by mouth 2 times daily for 10 days. 20 tablet 0   • albuterol 108 (90 Base) MCG/ACT inhaler Inhale 2 puffs into the lungs every 4 hours as needed for Shortness of Breath or Wheezing. 1 each 3     No current facility-administered medications for this visit.     ALLERGIES:   Allergen Reactions   • Seasonal Other (See Comments)     Puffy eyes and nasal congestion   • Cefdinir GI UPSET, DIZZINESS and DIARRHEA     Social History     Tobacco Use   • Smoking status: Never Smoker   • Smokeless tobacco: Never Used   Substance Use Topics   • Alcohol use: Never     Alcohol/week: 0.0 standard drinks   • Drug use: No     Family History   Problem Relation Age of Onset   • Thyroid Mother    • Diabetes Paternal Grandmother      Immunization History   Administered Date(s) Administered   • DTaP 02/08/2001   • DTaP(INFANRIX) 02/08/2001   • HPV Quadrivalent 07/19/2010, 10/12/2010, 02/18/2011   • Hep B, adolescent or pediatric 04/25/2011, 06/13/2011, 08/26/2011   • MMR 12/29/2010, 12/26/2013   • Polio, INACTIVATED 12/29/2010, 02/18/2011, 04/25/2011   • Tdap 10/12/2010, 10/12/2010, 09/13/2016   Deferred Date(s) Deferred   • DTaP(INFANRIX) 08/14/2013   • Hep B, Unspecified Formulation 08/14/2013   • Influenza, Unspecified Formulation 08/14/2013   • MMR 08/14/2013   • Meningococcus 08/14/2013   • Polio, Unspecified Formulation 08/14/2013   • Varicella 12/17/2012, 08/14/2013      Health Maintenance   Topic Date Due   • Varicella Vaccine (1 of 2 - 2-dose childhood series) Never done   • COVID-19 Vaccine (1) Never done   • Influenza Vaccine (Season Ended) 09/01/2022   • Depression Screening  01/17/2023   • Cervical Cancer Screening - <30 3 year  02/05/2023   • DTaP/Tdap/Td Vaccine (4 - Td or Tdap) 09/13/2026   • Hepatitis B Vaccine  Completed   • HPV Vaccine  Completed   • Meningococcal Vaccine  Aged Out   • Pneumococcal Vaccine 0-64  Aged Out       OBJECTIVE:    Visit Vitals  /70 (BP Location: RUE - Right upper extremity, Patient Position: Sitting, Cuff Size: Large Adult)   Pulse 83   Wt 97 kg (213 lb 13.5 oz)   LMP 11/27/2021 (Exact Date)   SpO2 97%   BMI 34.52 kg/m²     Physical Exam:    General:  Pleasant, alert, well-nourished, adult white female in no acute distress, breathing comfortably, and well-hydrated.  HEENT: Eyes:  Conjunctivae/sclerae normal.  No discharge or hyperemia.  No erythema, edema or exudate.    Nose:  Patent, nares with some clear discharge    Mouth:  Lips, mucosa, and tongue normal.  Teeth and gums normal.  Oropharynx is clear without any erythema    Tonsils:  1+, not swollen or erythematous; no exudate seen    Ears:  External ears normal.  Canals clear.  Tympanic membranes are clear with all landmarks noted.     Sinuses:  There is nonspecific tenderness to percussion in her sinuses on the right side more than the left.  NECK:  Supple, full ROM, and no thyromegaly  LYMPH:  No lymphadenopathy, with the exception of a more prominent lymph node on the right side about 3 cm inferior to her ear.  CV:  RRR, no murmur.  Pedal and radial pulses normal and equal bilaterally.  RESP:  Lungs have good symmetric movement.  There is no wheezing on either side, but there is prominent rhonchi in the left upper lobe only.  Other lung fields are completely clear.  No tachypnea.  ABDOMEN:  Soft, non-tender, no masses.  SKIN:  Warm and dry, no rashes or suspicious skin lesions  seen  EXTREMITIES:  Normal ROM, no cyanosis or edema.  Calves are soft, symmetric, and non-tender.  Examination of her back is essentially normal, with normal range of motion and no midline or muscular tenderness.    Labs/Studies:     Labs: Strep A Screen:  N/A    Influenza Screen:  N/A    COVID Rapid Screen:  N/A   COVID PCR (send out):  N/A   Other:  N/A     Imaging:  CXR:  Ordered      ASSESSMENT:     1. Wheezing      Discussion:  She presents in stable condition today, and her exam is notable for rhonchi in her left upper lobe.  There is no wheezing, although her pattern of symptoms is certainly suggestive of reactive airway disease.    Diagnostically, I would recommend getting a chest x-ray to see if she has pneumonia.  COVID is possible, although she has minimal other symptoms to suggest that.    Treatment-wise, she should continue the albuterol inhaler since it seems to help her.  A course of antibiotics for sinuses is reasonable as this is something she has experienced before and has found good relief with early treatment, along with antihistamines and expectorant.      PLAN:    Meds:  Continue current meds, and begin Augmentin 875 mg twice daily for 10 days, Allegra-D daily, and Flonase daily.   Diet:  Regular, with emphasis on good hydration.  Labs:  No new labs.  Studies:  Chest x-ray, PA and lateral  Consults:  Not at this time.  Other:  Non pharmacologic remedies, OTC meds, rest, contact precautions, etc. were discussed.    Follow-up in 2 weeks if not feeling better, or return sooner for worsening symptoms, intolerance of treatment, or any other concerns.    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

## 2022-06-27 NOTE — PATIENT PROFILE ADULT - CHOOSE INDICATION TO IMMUNIZE (AN ORDER WILL BE GENERATED WHEN THIS NOTE IS SAVED):
Radiology Tech Note            Time Out    Time Out Performed: Yes, @ 1020 by sc      Procedure Start Time: 0158     Procedure End Time: 8397     Radiation Safety Adhered: Yes     Radiation Dose: 0.2 min 0.82 mGy    Correct Patient: Yes    Correct Procedure: Yes    Site marking visible or correct site verified: Yes    Consent accurate and signed: Yes    Required images, implants, and equipment available: Yes    Patient allergies: Yes, verified    Fire risk assessment and special precautions: Assessed    Code status addressed (for non-full code patients):  Addressed    Antibiotic dose and time given, if appropriate: N/A    Beta blocker given, if ordered: N/A    Team invited to voice any concerns: Yes    _________________________________    Sign Out    Final procedure performed: Yes    Specimens removed: N/A     Specimen labeled with correct patient identifiers: N/A    Correct specimen testing ordered: N/A
Patient is not pregnant (male or female)

## 2022-08-07 NOTE — H&P PST ADULT - OTHER CARE PROVIDERS
Assistance with ambulation/Assistance OOB with selected safe patient handling equipment/Communicate Risk of Fall with Harm to all staff/Discuss with provider need for PT consult/Monitor gait and stability/Reinforce activity limits and safety measures with patient and family/Tailored Fall Risk Interventions/Visual Cue: Yellow wristband and red socks/Bed in lowest position, wheels locked, appropriate side rails in place/Call bell, personal items and telephone in reach/Instruct patient to call for assistance before getting out of bed or chair/Non-slip footwear when patient is out of bed/[place] to call system/Physically safe environment - no spills, clutter or unnecessary equipment/Purposeful Proactive Rounding/Room/bathroom lighting operational, light cord in reach
Cardiologist Dr. Waller [phone]

## 2022-09-13 ENCOUNTER — APPOINTMENT (OUTPATIENT)
Dept: UROLOGY | Facility: CLINIC | Age: 87
End: 2022-09-13

## 2022-09-13 VITALS
HEIGHT: 69 IN | TEMPERATURE: 96.6 F | HEART RATE: 76 BPM | SYSTOLIC BLOOD PRESSURE: 165 MMHG | DIASTOLIC BLOOD PRESSURE: 60 MMHG | BODY MASS INDEX: 16.29 KG/M2 | OXYGEN SATURATION: 99 % | WEIGHT: 110 LBS

## 2022-09-13 PROCEDURE — 99213 OFFICE O/P EST LOW 20 MIN: CPT

## 2022-09-13 NOTE — HISTORY OF PRESENT ILLNESS
[FreeTextEntry1] : Pt comes in bc of a abnormal PET scan/ Pt has MRI scheduled next week. Pt having gross hematuria that comes and goes.

## 2022-09-13 NOTE — ASSESSMENT
[FreeTextEntry1] : As per pt they would like to follow up with Dr. Carroll\par \par Plan\par MRI as scheduled\par fu with Dr. Carroll after MRI

## 2022-09-19 ENCOUNTER — APPOINTMENT (OUTPATIENT)
Dept: MRI IMAGING | Facility: CLINIC | Age: 87
End: 2022-09-19

## 2022-09-19 DIAGNOSIS — N39.0 URINARY TRACT INFECTION, SITE NOT SPECIFIED: ICD-10-CM

## 2022-09-19 LAB
APPEARANCE: ABNORMAL
BACTERIA UR CULT: ABNORMAL
BACTERIA: ABNORMAL
BILIRUBIN URINE: NEGATIVE
BLOOD URINE: ABNORMAL
COLOR: NORMAL
GLUCOSE QUALITATIVE U: NEGATIVE
HYALINE CASTS: 2 /LPF
KETONES URINE: NEGATIVE
LEUKOCYTE ESTERASE URINE: ABNORMAL
MICROSCOPIC-UA: NORMAL
NITRITE URINE: NEGATIVE
PH URINE: 6
PROTEIN URINE: ABNORMAL
RED BLOOD CELLS URINE: 69 /HPF
SPECIFIC GRAVITY URINE: 1.01
SQUAMOUS EPITHELIAL CELLS: 0 /HPF
URINE COMMENTS: NORMAL
UROBILINOGEN URINE: NORMAL
WHITE BLOOD CELLS URINE: 166 /HPF

## 2022-09-19 PROCEDURE — 72197 MRI PELVIS W/O & W/DYE: CPT | Mod: MH

## 2022-09-19 PROCEDURE — A9585: CPT

## 2022-09-19 RX ORDER — SULFAMETHOXAZOLE AND TRIMETHOPRIM 800; 160 MG/1; MG/1
800-160 TABLET ORAL TWICE DAILY
Qty: 14 | Refills: 0 | Status: ACTIVE | COMMUNITY
Start: 2022-09-19 | End: 1900-01-01

## 2022-09-27 ENCOUNTER — APPOINTMENT (OUTPATIENT)
Dept: UROLOGY | Facility: CLINIC | Age: 87
End: 2022-09-27

## 2022-09-27 VITALS — SYSTOLIC BLOOD PRESSURE: 125 MMHG | HEART RATE: 79 BPM | DIASTOLIC BLOOD PRESSURE: 59 MMHG

## 2022-09-27 DIAGNOSIS — C67.9 MALIGNANT NEOPLASM OF BLADDER, UNSPECIFIED: ICD-10-CM

## 2022-09-27 PROCEDURE — 99213 OFFICE O/P EST LOW 20 MIN: CPT

## 2022-09-29 PROBLEM — C67.9 BLADDER CANCER: Status: ACTIVE | Noted: 2021-09-27

## 2022-09-29 NOTE — HISTORY OF PRESENT ILLNESS
[FreeTextEntry1] : [age]-year-old patient was sent by Dr. Roberts to discuss the results of the CT scan noting several bladder tumors\par \par i had first seen him 2019 as a hospital transfer for intractable hematuria. 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 Ended up with > 20 u pRBCs and needed formalin after failing Alum.\par Now has experienced intermittent bouts gross hematuria.  I reviewed the CT scan with family & patient noting several bladder tumors, the largest @3cm.  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

## 2022-09-29 NOTE — ASSESSMENT
[FreeTextEntry1] : had been scheduled for a cystoscopy but family wanted to wait and discuss next steps.\par discussed ding a cystoscopy now and cauterizing the tumor and whatever else i noted today.\par also that nature of disease is recurrences - also biggest risk for him given age and comorbidities anesthesia.\par alternative just WW for now -\par after this discussion patient and family want to put off the cystoscopy. will call for more significant bleeding.

## 2022-12-14 NOTE — ADVANCED PRACTICE NURSE CONSULT - REASON FOR CONSULT
Patient seen on skin care rounds after wound care referral received for assessment of skin impairment and recommendations of topical management. Chart reviewed: Suleiman 18, BMI 20.4kg/m2. Patient H/O CKD, WILLIAM, CVA, prostate CA s/p radiation and radiation cystitis, CAD. Admitted with hematuria. Transfer from OSH.
Errol

## 2023-03-31 NOTE — PATIENT PROFILE ADULT - NSASFALLATTEMPTOOB_GEN_A_NUR
Pharmacy faxed request for medication refill.    Preferred pharmacy set up and verified      Patient has an appointment scheduled with Dr. Parra on 04/03/2023.  
Refused, will be addressed at appt Monday    Last OV: 12/9/22  Next OV: 4/3/23  Last lab: 12/2/22     levothyroxine 125 MCG tablet         Sig: Take 1 tablet by mouth daily.    Disp:  90 tablet    Refills:  0    Start: 3/31/2023    Class: Eprescribe    Non-formulary    Last ordered: 2 months ago by Pan Coelho DO     Thyroid Hormones Refill Protocol - 12 Month Protocol Passed 03/31/2023 12:52 PM   Protocol Details  Seen by prescribing provider or same department within the last 12 months or has a future appt in 3 months - IF FAILED PLEASE LOOK AT CHART REVIEW FOR LAST VISIT AND PROCEED ACCORDINGLY    Normal TSH within last 12 months looking at last value - IF FAILED REFER TO PROTOCOL DETAILS    Medication (including dose and sig) on current meds list          
no

## 2023-05-01 ENCOUNTER — APPOINTMENT (OUTPATIENT)
Dept: OPHTHALMOLOGY | Facility: CLINIC | Age: 88
End: 2023-05-01
Payer: MEDICARE

## 2023-05-01 ENCOUNTER — NON-APPOINTMENT (OUTPATIENT)
Age: 88
End: 2023-05-01

## 2023-05-01 PROCEDURE — 92014 COMPRE OPH EXAM EST PT 1/>: CPT

## 2023-05-01 PROCEDURE — 92004 COMPRE OPH EXAM NEW PT 1/>: CPT

## 2023-06-16 ENCOUNTER — APPOINTMENT (OUTPATIENT)
Dept: UROLOGY | Facility: CLINIC | Age: 88
End: 2023-06-16

## 2023-06-16 NOTE — ASU PREOP CHECKLIST - 4.
his PCP in 1 to 2 days. He is encouraged to stay hydrated. He is educated on signs symptoms that require emergent valuation. Counseling: The emergency provider has spoken with the patient and discussed todays results, in addition to providing specific details for the plan of care and counseling regarding the diagnosis and prognosis. Questions are answered at this time and they are agreeable with the plan.      --------------------------------- IMPRESSION AND DISPOSITION ---------------------------------    IMPRESSION  1. Cramp in limb        DISPOSITION  Disposition: Discharge to home  Patient condition is stable      NOTE: This report was transcribed using voice recognition software.  Every effort was made to ensure accuracy; however, inadvertent computerized transcription errors may be present        Chandu Vance MD  06/16/23 [id number]
2 bandaids right elbow

## 2023-07-06 ENCOUNTER — APPOINTMENT (OUTPATIENT)
Dept: UROLOGY | Facility: CLINIC | Age: 88
End: 2023-07-06
Payer: MEDICARE

## 2023-07-06 ENCOUNTER — TRANSCRIPTION ENCOUNTER (OUTPATIENT)
Age: 88
End: 2023-07-06

## 2023-07-06 VITALS
OXYGEN SATURATION: 96 % | WEIGHT: 110 LBS | RESPIRATION RATE: 14 BRPM | DIASTOLIC BLOOD PRESSURE: 69 MMHG | HEIGHT: 69 IN | HEART RATE: 69 BPM | BODY MASS INDEX: 16.29 KG/M2 | SYSTOLIC BLOOD PRESSURE: 173 MMHG | TEMPERATURE: 98 F

## 2023-07-06 PROCEDURE — 99212 OFFICE O/P EST SF 10 MIN: CPT

## 2023-07-07 NOTE — HISTORY OF PRESENT ILLNESS
[FreeTextEntry1] : Pt comes in for urinary retention. Pt would like to remove catheter. After apeaking with pt they do not want to remove beach today especially since it is late in the day and if pt fails TOV they may have to go to ED. Pt and family  want to follow up with Dr. Carroll on Tuesday to discuss. Beach in place draining clear yellow urine.

## 2023-07-11 ENCOUNTER — APPOINTMENT (OUTPATIENT)
Dept: UROLOGY | Facility: CLINIC | Age: 88
End: 2023-07-11
Payer: MEDICARE

## 2023-07-11 ENCOUNTER — OUTPATIENT (OUTPATIENT)
Dept: OUTPATIENT SERVICES | Facility: HOSPITAL | Age: 88
LOS: 1 days | End: 2023-07-11
Payer: MEDICARE

## 2023-07-11 DIAGNOSIS — Z95.1 PRESENCE OF AORTOCORONARY BYPASS GRAFT: Chronic | ICD-10-CM

## 2023-07-11 DIAGNOSIS — Z98.890 OTHER SPECIFIED POSTPROCEDURAL STATES: Chronic | ICD-10-CM

## 2023-07-11 DIAGNOSIS — R31.0 GROSS HEMATURIA: ICD-10-CM

## 2023-07-11 DIAGNOSIS — R33.9 RETENTION OF URINE, UNSPECIFIED: ICD-10-CM

## 2023-07-11 PROCEDURE — 51700 IRRIGATION OF BLADDER: CPT

## 2023-07-11 PROCEDURE — 51798 US URINE CAPACITY MEASURE: CPT

## 2023-07-11 NOTE — HISTORY OF PRESENT ILLNESS
[FreeTextEntry1] : [age]-year-old patient was sent by Dr. Roberts to discuss the results of the CT scan noting several bladder tumors\par \par i had first seen him 2019 as a hospital transfer for intractable hematuria. 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 Ended up with > 20 u pRBCs and needed formalin after failing Alum.\par Now has experienced intermittent bouts gross hematuria.  I reviewed the CT scan with family & patient noting several bladder tumors, the largest @3cm.  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

## 2023-07-12 PROBLEM — R33.9 URINARY RETENTION: Status: ACTIVE | Noted: 2021-09-22

## 2023-07-12 PROBLEM — R31.0 GROSS HEMATURIA: Status: ACTIVE | Noted: 2021-07-27

## 2023-07-13 LAB — BACTERIA UR CULT: NORMAL

## 2023-07-14 DIAGNOSIS — R33.9 RETENTION OF URINE, UNSPECIFIED: ICD-10-CM

## 2023-07-14 DIAGNOSIS — R31.0 GROSS HEMATURIA: ICD-10-CM

## 2024-06-07 NOTE — PROGRESS NOTE ADULT - PROBLEM SELECTOR PLAN 1
"Chief Complaint   Patient presents with    ER F/U     initial /68   Pulse 65   Temp 98.4  F (36.9  C) (Oral)   Resp 18   Ht 1.727 m (5' 8\")   Wt 88 kg (194 lb)   SpO2 100%   BMI 29.50 kg/m   Estimated body mass index is 29.5 kg/m  as calculated from the following:    Height as of this encounter: 1.727 m (5' 8\").    Weight as of this encounter: 88 kg (194 lb)..  bp completed using cuff size large  SANJUANITA CAMACHO LPN  " Secondary to Hematuria 2/2 radiation cystitis with low grade noninvasive bladder CA diagnosed recently   Last transfusion 12/14; urine yellow today, h/h stable  c/w epogen